# Patient Record
Sex: FEMALE | Race: WHITE | Employment: OTHER | URBAN - METROPOLITAN AREA
[De-identification: names, ages, dates, MRNs, and addresses within clinical notes are randomized per-mention and may not be internally consistent; named-entity substitution may affect disease eponyms.]

---

## 2017-01-05 ENCOUNTER — COMMUNICATION - HEALTHEAST (OUTPATIENT)
Dept: FAMILY MEDICINE | Facility: CLINIC | Age: 72
End: 2017-01-05

## 2017-01-05 ENCOUNTER — RECORDS - HEALTHEAST (OUTPATIENT)
Dept: ADMINISTRATIVE | Facility: OTHER | Age: 72
End: 2017-01-05

## 2017-01-30 ENCOUNTER — COMMUNICATION - HEALTHEAST (OUTPATIENT)
Dept: FAMILY MEDICINE | Facility: CLINIC | Age: 72
End: 2017-01-30

## 2017-01-30 DIAGNOSIS — M85.80 OSTEOPENIA: ICD-10-CM

## 2017-01-30 DIAGNOSIS — C50.919 BREAST CA (H): ICD-10-CM

## 2017-04-18 ENCOUNTER — OFFICE VISIT - HEALTHEAST (OUTPATIENT)
Dept: FAMILY MEDICINE | Facility: CLINIC | Age: 72
End: 2017-04-18

## 2017-04-18 DIAGNOSIS — M54.9 UPPER BACK PAIN: ICD-10-CM
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
## 2017-04-18 ASSESSMENT — MIFFLIN-ST. JEOR: SCORE: 1168.13

## 2017-04-21 ENCOUNTER — COMMUNICATION - HEALTHEAST (OUTPATIENT)
Dept: FAMILY MEDICINE | Facility: CLINIC | Age: 72
End: 2017-04-21

## 2017-05-04 ENCOUNTER — COMMUNICATION - HEALTHEAST (OUTPATIENT)
Dept: FAMILY MEDICINE | Facility: CLINIC | Age: 72
End: 2017-05-04

## 2017-06-10 ENCOUNTER — COMMUNICATION - HEALTHEAST (OUTPATIENT)
Dept: FAMILY MEDICINE | Facility: CLINIC | Age: 72
End: 2017-06-10

## 2017-06-10 DIAGNOSIS — N95.2 VAGINAL ATROPHY: ICD-10-CM

## 2017-06-15 ENCOUNTER — COMMUNICATION - HEALTHEAST (OUTPATIENT)
Dept: FAMILY MEDICINE | Facility: CLINIC | Age: 72
End: 2017-06-15

## 2017-07-09 ENCOUNTER — COMMUNICATION - HEALTHEAST (OUTPATIENT)
Dept: FAMILY MEDICINE | Facility: CLINIC | Age: 72
End: 2017-07-09

## 2017-07-20 ENCOUNTER — COMMUNICATION - HEALTHEAST (OUTPATIENT)
Dept: FAMILY MEDICINE | Facility: CLINIC | Age: 72
End: 2017-07-20

## 2017-07-20 DIAGNOSIS — C50.919 BREAST CA (H): ICD-10-CM

## 2017-08-04 ENCOUNTER — COMMUNICATION - HEALTHEAST (OUTPATIENT)
Dept: FAMILY MEDICINE | Facility: CLINIC | Age: 72
End: 2017-08-04

## 2017-08-07 ENCOUNTER — COMMUNICATION - HEALTHEAST (OUTPATIENT)
Dept: FAMILY MEDICINE | Facility: CLINIC | Age: 72
End: 2017-08-07

## 2017-08-07 DIAGNOSIS — M85.80 OSTEOPENIA: ICD-10-CM

## 2017-09-05 ENCOUNTER — RECORDS - HEALTHEAST (OUTPATIENT)
Dept: ADMINISTRATIVE | Facility: OTHER | Age: 72
End: 2017-09-05

## 2017-09-07 ENCOUNTER — HOSPITAL ENCOUNTER (OUTPATIENT)
Dept: CARDIOLOGY | Facility: HOSPITAL | Age: 72
Discharge: HOME | End: 2017-09-07

## 2017-09-07 DIAGNOSIS — R00.1 SINUS BRADYCARDIA: ICD-10-CM

## 2017-09-26 ENCOUNTER — RECORDS - HEALTHEAST (OUTPATIENT)
Dept: ADMINISTRATIVE | Facility: OTHER | Age: 72
End: 2017-09-26

## 2017-09-27 ENCOUNTER — RECORDS - HEALTHEAST (OUTPATIENT)
Dept: ADMINISTRATIVE | Facility: OTHER | Age: 72
End: 2017-09-27

## 2017-09-28 ENCOUNTER — RECORDS - HEALTHEAST (OUTPATIENT)
Dept: ADMINISTRATIVE | Facility: OTHER | Age: 72
End: 2017-09-28

## 2017-10-09 ENCOUNTER — COMMUNICATION - HEALTHEAST (OUTPATIENT)
Dept: FAMILY MEDICINE | Facility: CLINIC | Age: 72
End: 2017-10-09

## 2017-10-09 ENCOUNTER — AMBULATORY - HEALTHEAST (OUTPATIENT)
Dept: FAMILY MEDICINE | Facility: CLINIC | Age: 72
End: 2017-10-09

## 2017-10-09 ENCOUNTER — AMBULATORY - HEALTHEAST (OUTPATIENT)
Dept: NURSING | Facility: CLINIC | Age: 72
End: 2017-10-09

## 2017-10-09 DIAGNOSIS — Z12.31 VISIT FOR SCREENING MAMMOGRAM: ICD-10-CM

## 2017-10-09 DIAGNOSIS — Z12.39 SCREENING FOR BREAST CANCER: ICD-10-CM

## 2017-10-09 DIAGNOSIS — Z23 FLU VACCINE NEED: ICD-10-CM

## 2017-10-09 DIAGNOSIS — Z12.39 ENCOUNTER FOR BREAST CANCER SCREENING OTHER THAN MAMMOGRAM: ICD-10-CM

## 2017-10-09 DIAGNOSIS — Z12.11 SCREEN FOR COLON CANCER: ICD-10-CM

## 2017-10-10 ENCOUNTER — COMMUNICATION - HEALTHEAST (OUTPATIENT)
Dept: FAMILY MEDICINE | Facility: CLINIC | Age: 72
End: 2017-10-10

## 2017-10-10 DIAGNOSIS — C50.919 BREAST CA (H): ICD-10-CM

## 2017-10-12 ENCOUNTER — COMMUNICATION - HEALTHEAST (OUTPATIENT)
Dept: FAMILY MEDICINE | Facility: CLINIC | Age: 72
End: 2017-10-12

## 2017-10-17 ENCOUNTER — HOSPITAL ENCOUNTER (OUTPATIENT)
Dept: MAMMOGRAPHY | Facility: HOSPITAL | Age: 72
Discharge: HOME | End: 2017-10-17
Attending: FAMILY MEDICINE

## 2017-10-17 DIAGNOSIS — Z12.31 VISIT FOR SCREENING MAMMOGRAM: ICD-10-CM

## 2017-10-19 ENCOUNTER — COMMUNICATION - HEALTHEAST (OUTPATIENT)
Dept: FAMILY MEDICINE | Facility: CLINIC | Age: 72
End: 2017-10-19

## 2017-10-19 DIAGNOSIS — M85.80 OSTEOPENIA: ICD-10-CM

## 2017-10-23 ENCOUNTER — HOSPITAL ENCOUNTER (OUTPATIENT)
Dept: MAMMOGRAPHY | Facility: HOSPITAL | Age: 72
Discharge: HOME | End: 2017-10-23
Attending: FAMILY MEDICINE

## 2017-10-23 DIAGNOSIS — N64.89 BREAST ASYMMETRY: ICD-10-CM

## 2017-10-28 ENCOUNTER — COMMUNICATION - HEALTHEAST (OUTPATIENT)
Dept: FAMILY MEDICINE | Facility: CLINIC | Age: 72
End: 2017-10-28

## 2017-11-05 ENCOUNTER — RECORDS - HEALTHEAST (OUTPATIENT)
Dept: ADMINISTRATIVE | Facility: OTHER | Age: 72
End: 2017-11-05

## 2017-11-14 ENCOUNTER — COMMUNICATION - HEALTHEAST (OUTPATIENT)
Dept: FAMILY MEDICINE | Facility: CLINIC | Age: 72
End: 2017-11-14

## 2017-12-04 ENCOUNTER — COMMUNICATION - HEALTHEAST (OUTPATIENT)
Dept: FAMILY MEDICINE | Facility: CLINIC | Age: 72
End: 2017-12-04

## 2017-12-04 DIAGNOSIS — C50.919 BREAST CA (H): ICD-10-CM

## 2017-12-04 DIAGNOSIS — N95.2 VAGINAL ATROPHY: ICD-10-CM

## 2017-12-15 ENCOUNTER — OFFICE VISIT - HEALTHEAST (OUTPATIENT)
Dept: FAMILY MEDICINE | Facility: CLINIC | Age: 72
End: 2017-12-15

## 2017-12-15 DIAGNOSIS — H26.9 CATARACT: ICD-10-CM

## 2017-12-15 DIAGNOSIS — Z01.818 PREOP EXAMINATION: ICD-10-CM

## 2017-12-15 ASSESSMENT — MIFFLIN-ST. JEOR: SCORE: 1172.67

## 2018-02-01 ENCOUNTER — COMMUNICATION - HEALTHEAST (OUTPATIENT)
Dept: FAMILY MEDICINE | Facility: CLINIC | Age: 73
End: 2018-02-01

## 2018-02-05 ENCOUNTER — OFFICE VISIT - HEALTHEAST (OUTPATIENT)
Dept: FAMILY MEDICINE | Facility: CLINIC | Age: 73
End: 2018-02-05

## 2018-02-05 DIAGNOSIS — H91.90 HEARING DECREASED: ICD-10-CM

## 2018-02-05 DIAGNOSIS — N95.0 POSTMENOPAUSAL BLEEDING: ICD-10-CM

## 2018-02-05 DIAGNOSIS — R31.9 BLOOD IN URINE: ICD-10-CM

## 2018-02-05 LAB
ALBUMIN UR-MCNC: ABNORMAL MG/DL
APPEARANCE UR: CLEAR
BILIRUB UR QL STRIP: NEGATIVE
COLOR UR AUTO: YELLOW
GLUCOSE UR STRIP-MCNC: NEGATIVE MG/DL
HGB UR QL STRIP: NEGATIVE
KETONES UR STRIP-MCNC: NEGATIVE MG/DL
LEUKOCYTE ESTERASE UR QL STRIP: ABNORMAL
NITRATE UR QL: NEGATIVE
PH UR STRIP: 7 [PH] (ref 5–8)
SP GR UR STRIP: 1.01 (ref 1–1.03)
UROBILINOGEN UR STRIP-ACNC: ABNORMAL

## 2018-02-05 ASSESSMENT — MIFFLIN-ST. JEOR: SCORE: 1195.35

## 2018-02-06 LAB
BACTERIA SPEC CULT: NO GROWTH
HPV SOURCE: NORMAL
HUMAN PAPILLOMA VIRUS 16 DNA: NEGATIVE
HUMAN PAPILLOMA VIRUS 18 DNA: NEGATIVE
HUMAN PAPILLOMA VIRUS FINAL DIAGNOSIS: NORMAL
HUMAN PAPILLOMA VIRUS OTHER HR: NEGATIVE
SPECIMEN DESCRIPTION: NORMAL

## 2018-02-12 LAB
BKR LAB AP ABNORMAL BLEEDING: YES
BKR LAB AP BIRTH CONTROL/HORMONES: NORMAL
BKR LAB AP CERVICAL APPEARANCE: NORMAL
BKR LAB AP GYN ADEQUACY: NORMAL
BKR LAB AP GYN INTERPRETATION: NORMAL
BKR LAB AP HPV REFLEX: NORMAL
BKR LAB AP LMP: NORMAL
BKR LAB AP PATIENT STATUS: NORMAL
BKR LAB AP PREVIOUS ABNORMAL: NO
BKR LAB AP PREVIOUS NORMAL: NORMAL
HIGH RISK?: NO
PATH REPORT.COMMENTS IMP SPEC: NORMAL
RESULT FLAG (HE HISTORICAL CONVERSION): NORMAL

## 2018-02-15 ENCOUNTER — OFFICE VISIT - HEALTHEAST (OUTPATIENT)
Dept: AUDIOLOGY | Facility: CLINIC | Age: 73
End: 2018-02-15

## 2018-02-15 DIAGNOSIS — H90.3 SENSORINEURAL HEARING LOSS, BILATERAL: ICD-10-CM

## 2018-03-29 ENCOUNTER — RECORDS - HEALTHEAST (OUTPATIENT)
Dept: ADMINISTRATIVE | Facility: OTHER | Age: 73
End: 2018-03-29

## 2018-09-06 ENCOUNTER — COMMUNICATION - HEALTHEAST (OUTPATIENT)
Dept: FAMILY MEDICINE | Facility: CLINIC | Age: 73
End: 2018-09-06

## 2018-09-06 DIAGNOSIS — M85.80 OSTEOPENIA: ICD-10-CM

## 2018-09-08 ENCOUNTER — COMMUNICATION - HEALTHEAST (OUTPATIENT)
Dept: FAMILY MEDICINE | Facility: CLINIC | Age: 73
End: 2018-09-08

## 2018-11-01 ENCOUNTER — RECORDS - HEALTHEAST (OUTPATIENT)
Dept: ADMINISTRATIVE | Facility: OTHER | Age: 73
End: 2018-11-01

## 2018-12-06 ENCOUNTER — RECORDS - HEALTHEAST (OUTPATIENT)
Dept: ADMINISTRATIVE | Facility: OTHER | Age: 73
End: 2018-12-06

## 2018-12-17 ENCOUNTER — OFFICE VISIT - HEALTHEAST (OUTPATIENT)
Dept: FAMILY MEDICINE | Facility: CLINIC | Age: 73
End: 2018-12-17

## 2018-12-17 DIAGNOSIS — Z13.1 ENCOUNTER FOR SCREENING FOR DIABETES MELLITUS: ICD-10-CM

## 2018-12-17 DIAGNOSIS — M85.80 OSTEOPENIA, UNSPECIFIED LOCATION: ICD-10-CM

## 2018-12-17 DIAGNOSIS — F40.10 SOCIAL ANXIETY DISORDER: ICD-10-CM

## 2018-12-17 DIAGNOSIS — C50.912 BILATERAL MALIGNANT NEOPLASM OF BREAST IN FEMALE, UNSPECIFIED ESTROGEN RECEPTOR STATUS, UNSPECIFIED SITE OF BREAST (H): ICD-10-CM

## 2018-12-17 DIAGNOSIS — E04.9 THYROID GOITER: ICD-10-CM

## 2018-12-17 DIAGNOSIS — C50.911 BILATERAL MALIGNANT NEOPLASM OF BREAST IN FEMALE, UNSPECIFIED ESTROGEN RECEPTOR STATUS, UNSPECIFIED SITE OF BREAST (H): ICD-10-CM

## 2018-12-17 DIAGNOSIS — Z00.00 MEDICARE ANNUAL WELLNESS VISIT, SUBSEQUENT: ICD-10-CM

## 2018-12-17 DIAGNOSIS — L65.9 HAIR LOSS: ICD-10-CM

## 2018-12-17 LAB
FASTING STATUS PATIENT QL REPORTED: YES
GLUCOSE BLD-MCNC: 74 MG/DL (ref 70–99)
HGB BLD-MCNC: 12.1 G/DL (ref 12–16)
TSH SERPL DL<=0.005 MIU/L-ACNC: 1.81 UIU/ML (ref 0.3–5)

## 2018-12-17 ASSESSMENT — MIFFLIN-ST. JEOR: SCORE: 1177.2

## 2018-12-20 ENCOUNTER — RECORDS - HEALTHEAST (OUTPATIENT)
Dept: ADMINISTRATIVE | Facility: OTHER | Age: 73
End: 2018-12-20

## 2018-12-20 ENCOUNTER — RECORDS - HEALTHEAST (OUTPATIENT)
Dept: BONE DENSITY | Facility: CLINIC | Age: 73
End: 2018-12-20

## 2018-12-20 DIAGNOSIS — M85.80 OTHER SPECIFIED DISORDERS OF BONE DENSITY AND STRUCTURE, UNSPECIFIED SITE: ICD-10-CM

## 2018-12-20 DIAGNOSIS — Z78.0 ASYMPTOMATIC MENOPAUSAL STATE: ICD-10-CM

## 2019-09-27 ENCOUNTER — COMMUNICATION - HEALTHEAST (OUTPATIENT)
Dept: FAMILY MEDICINE | Facility: CLINIC | Age: 74
End: 2019-09-27

## 2019-09-27 ENCOUNTER — RECORDS - HEALTHEAST (OUTPATIENT)
Dept: ADMINISTRATIVE | Facility: OTHER | Age: 74
End: 2019-09-27

## 2019-09-27 DIAGNOSIS — Z12.31 ENCOUNTER FOR SCREENING MAMMOGRAM FOR BREAST CANCER: ICD-10-CM

## 2019-09-27 LAB — OCCULT BLOOD (FIT)_EXT (HISTORICAL CONVERSION): NEGATIVE

## 2019-10-24 ENCOUNTER — HOSPITAL ENCOUNTER (OUTPATIENT)
Dept: MAMMOGRAPHY | Facility: CLINIC | Age: 74
Discharge: HOME | End: 2019-10-24
Attending: FAMILY MEDICINE

## 2019-10-24 DIAGNOSIS — Z12.31 ENCOUNTER FOR SCREENING MAMMOGRAM FOR BREAST CANCER: ICD-10-CM

## 2019-11-01 ENCOUNTER — RECORDS - HEALTHEAST (OUTPATIENT)
Dept: HEALTH INFORMATION MANAGEMENT | Facility: CLINIC | Age: 74
End: 2019-11-01

## 2019-12-19 ENCOUNTER — OFFICE VISIT - HEALTHEAST (OUTPATIENT)
Dept: FAMILY MEDICINE | Facility: CLINIC | Age: 74
End: 2019-12-19

## 2019-12-19 ENCOUNTER — RECORDS - HEALTHEAST (OUTPATIENT)
Dept: ADMINISTRATIVE | Facility: OTHER | Age: 74
End: 2019-12-19

## 2019-12-19 DIAGNOSIS — L81.4 OTHER MELANIN HYPERPIGMENTATION: ICD-10-CM

## 2019-12-19 DIAGNOSIS — Z00.00 MEDICARE ANNUAL WELLNESS VISIT, SUBSEQUENT: ICD-10-CM

## 2019-12-19 DIAGNOSIS — C50.911 BILATERAL MALIGNANT NEOPLASM OF BREAST IN FEMALE, UNSPECIFIED ESTROGEN RECEPTOR STATUS, UNSPECIFIED SITE OF BREAST (H): ICD-10-CM

## 2019-12-19 DIAGNOSIS — M85.80 OSTEOPENIA, UNSPECIFIED LOCATION: ICD-10-CM

## 2019-12-19 DIAGNOSIS — Z08 ENCOUNTER FOR FOLLOW-UP EXAMINATION AFTER COMPLETED TREATMENT FOR MALIGNANT NEOPLASM: ICD-10-CM

## 2019-12-19 DIAGNOSIS — N95.0 POSTMENOPAUSAL BLEEDING: ICD-10-CM

## 2019-12-19 DIAGNOSIS — D18.01 HEMANGIOMA OF SKIN AND SUBCUTANEOUS TISSUE: ICD-10-CM

## 2019-12-19 DIAGNOSIS — L65.9 NON-SCARRING HAIR LOSS: ICD-10-CM

## 2019-12-19 DIAGNOSIS — C50.912 BILATERAL MALIGNANT NEOPLASM OF BREAST IN FEMALE, UNSPECIFIED ESTROGEN RECEPTOR STATUS, UNSPECIFIED SITE OF BREAST (H): ICD-10-CM

## 2019-12-19 DIAGNOSIS — Z85.828 PERSONAL HISTORY OF OTHER MALIGNANT NEOPLASM OF SKIN: ICD-10-CM

## 2019-12-19 DIAGNOSIS — D46.Z OTHER MYELODYSPLASTIC SYNDROMES (H): ICD-10-CM

## 2019-12-19 DIAGNOSIS — L98.8 OTHER SPECIFIED DISORDERS OF THE SKIN AND SUBCUTANEOUS TISSUE: ICD-10-CM

## 2019-12-19 DIAGNOSIS — L21.8 OTHER SEBORRHEIC DERMATITIS: ICD-10-CM

## 2019-12-19 DIAGNOSIS — Z13.220 ENCOUNTER FOR SCREENING FOR LIPOID DISORDERS: ICD-10-CM

## 2019-12-19 DIAGNOSIS — E21.2 OTHER HYPERPARATHYROIDISM (H): ICD-10-CM

## 2019-12-19 DIAGNOSIS — Z13.1 ENCOUNTER FOR SCREENING FOR DIABETES MELLITUS: ICD-10-CM

## 2019-12-19 DIAGNOSIS — E04.9 THYROID GOITER: ICD-10-CM

## 2019-12-19 LAB
FASTING STATUS PATIENT QL REPORTED: NO
FERRITIN SERPL-MCNC: 76 NG/ML (ref 10–130)
FOLATE SERPL-MCNC: >20 NG/ML
GLUCOSE BLD-MCNC: 96 MG/DL (ref 74–125)
T4 FREE SERPL-MCNC: 1 NG/DL (ref 0.7–1.8)
TSH SERPL DL<=0.005 MIU/L-ACNC: 1.13 UIU/ML (ref 0.3–5)

## 2019-12-19 ASSESSMENT — MIFFLIN-ST. JEOR: SCORE: 1195.35

## 2019-12-20 LAB
25(OH)D3 SERPL-MCNC: 40.2 NG/ML (ref 30–80)
25(OH)D3 SERPL-MCNC: 40.2 NG/ML (ref 30–80)

## 2019-12-21 LAB — DHEA-S SERPL-MCNC: 22 UG/DL (ref 10–90)

## 2019-12-22 LAB — ANDROST SERPL-MCNC: 0.2 NG/ML (ref 0.13–0.82)

## 2019-12-23 LAB
ANA SER QL: 0.3 U
ZINC SERPL-MCNC: 66.3 UG/DL (ref 60–120)

## 2019-12-24 LAB
SHBG SERPL-SCNC: 96 NMOL/L (ref 30–135)
TESTOST FREE SERPL-MCNC: 0.04 NG/DL (ref 0.06–0.38)
TESTOST SERPL-MCNC: 7 NG/DL (ref 8–60)

## 2020-01-14 ENCOUNTER — COMMUNICATION - HEALTHEAST (OUTPATIENT)
Dept: FAMILY MEDICINE | Facility: CLINIC | Age: 75
End: 2020-01-14

## 2020-02-14 ENCOUNTER — RECORDS - HEALTHEAST (OUTPATIENT)
Dept: ADMINISTRATIVE | Facility: OTHER | Age: 75
End: 2020-02-14

## 2020-07-01 ENCOUNTER — COMMUNICATION - HEALTHEAST (OUTPATIENT)
Dept: FAMILY MEDICINE | Facility: CLINIC | Age: 75
End: 2020-07-01

## 2020-07-01 DIAGNOSIS — N39.0 RECURRENT UTI: ICD-10-CM

## 2020-09-01 ENCOUNTER — COMMUNICATION - HEALTHEAST (OUTPATIENT)
Dept: FAMILY MEDICINE | Facility: CLINIC | Age: 75
End: 2020-09-01

## 2020-11-11 ENCOUNTER — HOSPITAL ENCOUNTER (OUTPATIENT)
Dept: MAMMOGRAPHY | Facility: CLINIC | Age: 75
Discharge: HOME | End: 2020-11-11
Attending: FAMILY MEDICINE

## 2020-11-11 DIAGNOSIS — Z12.31 VISIT FOR SCREENING MAMMOGRAM: ICD-10-CM

## 2020-12-25 ENCOUNTER — COMMUNICATION - HEALTHEAST (OUTPATIENT)
Dept: FAMILY MEDICINE | Facility: CLINIC | Age: 75
End: 2020-12-25

## 2021-01-13 ENCOUNTER — OFFICE VISIT - HEALTHEAST (OUTPATIENT)
Dept: FAMILY MEDICINE | Facility: CLINIC | Age: 76
End: 2021-01-13

## 2021-01-13 DIAGNOSIS — E04.9 THYROID GOITER: ICD-10-CM

## 2021-01-13 DIAGNOSIS — C50.912 BILATERAL MALIGNANT NEOPLASM OF BREAST IN FEMALE, UNSPECIFIED ESTROGEN RECEPTOR STATUS, UNSPECIFIED SITE OF BREAST (H): ICD-10-CM

## 2021-01-13 DIAGNOSIS — Z13.220 ENCOUNTER FOR SCREENING FOR LIPOID DISORDERS: ICD-10-CM

## 2021-01-13 DIAGNOSIS — C50.911 BILATERAL MALIGNANT NEOPLASM OF BREAST IN FEMALE, UNSPECIFIED ESTROGEN RECEPTOR STATUS, UNSPECIFIED SITE OF BREAST (H): ICD-10-CM

## 2021-01-13 DIAGNOSIS — Z78.0 MENOPAUSE: ICD-10-CM

## 2021-01-13 DIAGNOSIS — M85.80 OSTEOPENIA, UNSPECIFIED LOCATION: ICD-10-CM

## 2021-01-13 DIAGNOSIS — H91.93 BILATERAL HEARING LOSS, UNSPECIFIED HEARING LOSS TYPE: ICD-10-CM

## 2021-01-13 DIAGNOSIS — Z00.00 MEDICARE ANNUAL WELLNESS VISIT, SUBSEQUENT: ICD-10-CM

## 2021-01-13 DIAGNOSIS — Z13.1 ENCOUNTER FOR SCREENING FOR DIABETES MELLITUS: ICD-10-CM

## 2021-01-13 DIAGNOSIS — Z90.710 H/O HYSTERECTOMY FOR BENIGN DISEASE: ICD-10-CM

## 2021-01-13 DIAGNOSIS — Z12.11 SPECIAL SCREENING FOR MALIGNANT NEOPLASMS, COLON: ICD-10-CM

## 2021-01-13 LAB
ALBUMIN SERPL-MCNC: 3.7 G/DL (ref 3.5–5)
ALP SERPL-CCNC: 66 U/L (ref 45–120)
ALT SERPL W P-5'-P-CCNC: 19 U/L (ref 0–45)
ANION GAP SERPL CALCULATED.3IONS-SCNC: 10 MMOL/L (ref 5–18)
AST SERPL W P-5'-P-CCNC: 30 U/L (ref 0–40)
BILIRUB SERPL-MCNC: 0.6 MG/DL (ref 0–1)
BUN SERPL-MCNC: 21 MG/DL (ref 8–28)
CALCIUM SERPL-MCNC: 9.7 MG/DL (ref 8.5–10.5)
CHLORIDE BLD-SCNC: 107 MMOL/L (ref 98–107)
CHOLEST SERPL-MCNC: 202 MG/DL
CO2 SERPL-SCNC: 24 MMOL/L (ref 22–31)
CREAT SERPL-MCNC: 1.16 MG/DL (ref 0.6–1.1)
ERYTHROCYTE [DISTWIDTH] IN BLOOD BY AUTOMATED COUNT: 11.7 % (ref 11–14.5)
FASTING STATUS PATIENT QL REPORTED: YES
GFR SERPL CREATININE-BSD FRML MDRD: 46 ML/MIN/1.73M2
GLUCOSE BLD-MCNC: 76 MG/DL (ref 70–125)
HCT VFR BLD AUTO: 42.3 % (ref 35–47)
HDLC SERPL-MCNC: 57 MG/DL
HGB BLD-MCNC: 14.1 G/DL (ref 12–16)
LDLC SERPL CALC-MCNC: 122 MG/DL
MCH RBC QN AUTO: 30.6 PG (ref 27–34)
MCHC RBC AUTO-ENTMCNC: 33.2 G/DL (ref 32–36)
MCV RBC AUTO: 92 FL (ref 80–100)
PLATELET # BLD AUTO: 162 THOU/UL (ref 140–440)
PMV BLD AUTO: 8.2 FL (ref 7–10)
POTASSIUM BLD-SCNC: 4.4 MMOL/L (ref 3.5–5)
PROT SERPL-MCNC: 6.6 G/DL (ref 6–8)
RBC # BLD AUTO: 4.59 MILL/UL (ref 3.8–5.4)
SODIUM SERPL-SCNC: 141 MMOL/L (ref 136–145)
TRIGL SERPL-MCNC: 114 MG/DL
TSH SERPL DL<=0.005 MIU/L-ACNC: 2.15 UIU/ML (ref 0.3–5)
WBC: 6.1 THOU/UL (ref 4–11)

## 2021-01-13 ASSESSMENT — MIFFLIN-ST. JEOR: SCORE: 1170.85

## 2021-02-05 ENCOUNTER — COMMUNICATION - HEALTHEAST (OUTPATIENT)
Dept: FAMILY MEDICINE | Facility: CLINIC | Age: 76
End: 2021-02-05

## 2021-02-08 ENCOUNTER — COMMUNICATION - HEALTHEAST (OUTPATIENT)
Dept: FAMILY MEDICINE | Facility: CLINIC | Age: 76
End: 2021-02-08

## 2021-02-19 ENCOUNTER — RECORDS - HEALTHEAST (OUTPATIENT)
Dept: ADMINISTRATIVE | Facility: OTHER | Age: 76
End: 2021-02-19

## 2021-02-19 ENCOUNTER — RECORDS - HEALTHEAST (OUTPATIENT)
Dept: BONE DENSITY | Facility: CLINIC | Age: 76
End: 2021-02-19

## 2021-02-19 DIAGNOSIS — Z78.0 ASYMPTOMATIC MENOPAUSAL STATE: ICD-10-CM

## 2021-02-23 ENCOUNTER — COMMUNICATION - HEALTHEAST (OUTPATIENT)
Dept: FAMILY MEDICINE | Facility: CLINIC | Age: 76
End: 2021-02-23

## 2021-02-23 DIAGNOSIS — M85.80 OSTEOPENIA, UNSPECIFIED LOCATION: ICD-10-CM

## 2021-02-24 ENCOUNTER — COMMUNICATION - HEALTHEAST (OUTPATIENT)
Dept: ADMINISTRATIVE | Facility: CLINIC | Age: 76
End: 2021-02-24

## 2021-02-24 DIAGNOSIS — M85.80 OSTEOPENIA, UNSPECIFIED LOCATION: ICD-10-CM

## 2021-03-22 ENCOUNTER — AMBULATORY - HEALTHEAST (OUTPATIENT)
Dept: LAB | Facility: CLINIC | Age: 76
End: 2021-03-22

## 2021-03-22 DIAGNOSIS — M85.80 OSTEOPENIA, UNSPECIFIED LOCATION: ICD-10-CM

## 2021-03-23 LAB — 25(OH)D3 SERPL-MCNC: 62.5 NG/ML (ref 30–80)

## 2021-04-28 ENCOUNTER — OFFICE VISIT - HEALTHEAST (OUTPATIENT)
Dept: ENDOCRINOLOGY | Facility: CLINIC | Age: 76
End: 2021-04-28

## 2021-04-28 DIAGNOSIS — M81.0 SENILE OSTEOPOROSIS: ICD-10-CM

## 2021-04-28 DIAGNOSIS — M81.0 OSTEOPOROSIS WITHOUT CURRENT PATHOLOGICAL FRACTURE, UNSPECIFIED OSTEOPOROSIS TYPE: ICD-10-CM

## 2021-04-28 DIAGNOSIS — Z92.29 PERSONAL HISTORY OF OTHER DRUG THERAPY: ICD-10-CM

## 2021-04-28 DIAGNOSIS — E89.0 POSTABLATIVE HYPOTHYROIDISM: ICD-10-CM

## 2021-04-28 RX ORDER — LEVOTHYROXINE SODIUM 25 UG/1
25 TABLET ORAL DAILY
Qty: 90 TABLET | Refills: 3 | Status: SHIPPED | OUTPATIENT
Start: 2021-04-28 | End: 2022-02-15

## 2021-05-12 ENCOUNTER — COMMUNICATION - HEALTHEAST (OUTPATIENT)
Dept: ADMINISTRATIVE | Facility: CLINIC | Age: 76
End: 2021-05-12

## 2021-05-18 ENCOUNTER — COMMUNICATION - HEALTHEAST (OUTPATIENT)
Dept: ENDOCRINOLOGY | Facility: CLINIC | Age: 76
End: 2021-05-18

## 2021-05-30 VITALS — BODY MASS INDEX: 21.82 KG/M2 | WEIGHT: 139 LBS | HEIGHT: 67 IN

## 2021-05-31 VITALS — HEIGHT: 67 IN | BODY MASS INDEX: 21.97 KG/M2 | WEIGHT: 140 LBS

## 2021-05-31 VITALS — HEIGHT: 67 IN | WEIGHT: 145 LBS | BODY MASS INDEX: 22.76 KG/M2

## 2021-06-02 VITALS — HEIGHT: 67 IN | WEIGHT: 141 LBS | BODY MASS INDEX: 22.13 KG/M2

## 2021-06-04 VITALS
SYSTOLIC BLOOD PRESSURE: 108 MMHG | BODY MASS INDEX: 22.76 KG/M2 | HEART RATE: 60 BPM | WEIGHT: 145 LBS | RESPIRATION RATE: 12 BRPM | TEMPERATURE: 97.8 F | DIASTOLIC BLOOD PRESSURE: 68 MMHG | HEIGHT: 67 IN

## 2021-06-04 NOTE — PROGRESS NOTES
Assessment and Plan:       1. Medicare annual wellness visit, subsequent  As far as healthcare maintenance, she had 18.  She did a fit test this year and it was normal.  She had a normal mammogram this year.  She is up-to-date on immunizations.  She is good about seeing the dentist and getting regular exercise.  PHQ 2 equals 0  Mini cog equals 5 out of 5  She had her hearing checked last year and just uses an amplifier.  She does have some urinary leakage, but is just wearing a pad and doing some Kegel exercises.  She thinks it is manageable at this point.    2. Osteopenia, unspecified location  We have been following her osteopenia with high fracture risk.  She prefers to not take Fosamax.  We will recheck her bone density next year so we can tell if there is decline or stability.  If there is some decline, she be willing to see endocrine for possible Prolia or other treatment.    3. Thyroid goiter  She has a prior history of an ablation.  We check a TSH yearly and this has been stable.    4. Bilateral malignant neoplasm of breast in female, unspecified estrogen receptor status, unspecified site of breast (H)  She has a prior history of breast cancer with lumpectomy.  She is up-to-date on mammogram.    5. Encounter for screening for diabetes mellitus  - Glucose      6. Encounter for follow-up examination after completed treatment for malignant neoplasm  Patient saw dermatology for some hair loss and they want listed labs done.  These will be forwarded to dermatology once they are back.  - Androstenedione  - Dehydroepiandrosterone Sulfate, Serum (DHEAS)  - Sex Hormone-Binding Globulin (SHBG)  - Testosterone, Free and Total  - Vitamin D, Total (25-Hydroxy)  - Ferritin  - Folate, Serum  - T4, Free  - Thyroid Stimulating Hormone (TSH)  - Antinuclear Antibodies Screen (RAJANI)  - Zinc, Serum    The patient's current medical problems were reviewed.    The following health maintenance schedule was reviewed with the patient  and provided in printed form in the after visit summary:   Health Maintenance   Topic Date Due     HEPATITIS C SCREENING  1945     ZOSTER VACCINES (2 of 3) 02/11/2010     FALL RISK ASSESSMENT  07/27/2012     INFLUENZA VACCINE RULE BASED (1) 08/01/2019     MEDICARE ANNUAL WELLNESS VISIT  12/17/2019     TD 18+ HE  09/10/2020     DXA SCAN  12/20/2020     LIPID  09/08/2021     MAMMOGRAM  10/24/2021     COLONOSCOPY  10/22/2023     ADVANCE CARE PLANNING  12/17/2023     PNEUMOCOCCAL IMMUNIZATION 65+ LOW/MEDIUM RISK  Completed        Subjective:   Chief Complaint: Ruth Pinon is an 74 y.o. female here for an Annual Wellness visit.   HPI: Overall, patient has been doing very well.  She is very healthy and really does not have any major medical problems other than some osteopenia with high fracture risk.  She does not take any prescription medications.  She takes quite a few supplements.  She is exercising regularly.  She did not want to restart Fosamax so our plan is to recheck bone density next year to check for stability.  She just saw dermatology today for some hair loss and they want her to have some blood work done here.  As far as healthcare maintenance, she is up-to-date on immunizations and mammogram.  She had a normal Cologuard in 2017.  She is getting regular dental and eye visits.    Review of Systems:    Please see above.  The rest of the review of systems are negative for all systems.    Patient Care Team:  Nasra Houser MD as PCP - General (Family Medicine)      Patient Active Problem List   Diagnosis     Breast CA (H)     Thyroid goiter     H/O hysterectomy for benign disease     Social anxiety disorder     Osteopenia     Hearing loss, bilateral     Past Medical History:   Diagnosis Date     Breast cancer (H) 2001    left      Past Surgical History:   Procedure Laterality Date     BLADDER REPAIR       BREAST BIOPSY Left 2001     BREAST LUMPECTOMY Left 2001     HYSTERECTOMY        Family History    Problem Relation Age of Onset     Colon cancer Mother       Social History     Socioeconomic History     Marital status: Single     Spouse name: Not on file     Number of children: Not on file     Years of education: Not on file     Highest education level: Not on file   Occupational History     Not on file   Social Needs     Financial resource strain: Not on file     Food insecurity:     Worry: Not on file     Inability: Not on file     Transportation needs:     Medical: Not on file     Non-medical: Not on file   Tobacco Use     Smoking status: Never Smoker     Smokeless tobacco: Never Used   Substance and Sexual Activity     Alcohol use: Not on file     Drug use: Not on file     Sexual activity: Not on file   Lifestyle     Physical activity:     Days per week: Not on file     Minutes per session: Not on file     Stress: Not on file   Relationships     Social connections:     Talks on phone: Not on file     Gets together: Not on file     Attends Caodaism service: Not on file     Active member of club or organization: Not on file     Attends meetings of clubs or organizations: Not on file     Relationship status: Not on file     Intimate partner violence:     Fear of current or ex partner: Not on file     Emotionally abused: Not on file     Physically abused: Not on file     Forced sexual activity: Not on file   Other Topics Concern     Not on file   Social History Narrative     Not on file      Current Outpatient Medications   Medication Sig Dispense Refill     ASCORBATE CALCIUM (VITAMIN C ORAL) Take by mouth.       b complex vitamins tablet Take 1 tablet by mouth daily.       biotin 1 mg tablet Take 1,000 mcg by mouth 3 (three) times a day.       cholecalciferol, vitamin D3, (VITAMIN D3) 2,000 unit cap Take by mouth.       COPPER ORAL Take by mouth.       LUTEIN ORAL Take by mouth.       MAGNESIUM ORAL Take by mouth.       MILK THISTLE ORAL Take by mouth. rarely       OMEGA-3S/DHA/EPA/FISH OIL (OMEGA 3 ORAL)  "Take by mouth.       s-adenosylmethionine (JARED-E, ENTERIC COATED,) 400 mg TbEC Take by mouth.       vitamin E 400 unit capsule Take 400 Units by mouth daily.       XANTHAN GUM ORAL Take by mouth.       No current facility-administered medications for this visit.       Objective:   Vital Signs:   Visit Vitals  /68   Pulse 60   Temp 97.8  F (36.6  C) (Oral)   Resp 12   Ht 5' 7\" (1.702 m)   Wt 145 lb (65.8 kg)   BMI 22.71 kg/m         VisionScreening:  No exam data present     PHYSICAL EXAM    Physical Exam:  General Appearance: Alert, cooperative, no distress, appears stated age  Head: Normocephalic, without obvious abnormality, atraumatic  Eyes: PERRL, conjunctiva/corneas clear, EOM's intact  Ears: Normal TM's and external ear canals, both ears  Nose: Nares normal, septum midline,mucosa normal, no drainage  Throat: Lips, mucosa, and tongue normal; teeth and gums normal  Neck: Supple, symmetrical, trachea midline, no adenopathy; thyroid: not enlarged, symmetric, no tenderness/mass/nodules; no carotid bruit  Back: Symmetric, no curvature, ROM normal, no CVA tenderness  Lungs: Clear to auscultation bilaterally, respirations unlabored  Breasts: No breast masses, tenderness, asymmetry, or nipple discharge, surgical scar left breast  Heart: Regular rate and rhythm, S1 and S2 normal, no murmur, rub, or gallop,   Abdomen: Soft, non-tender, bowel sounds active all four quadrants,  no masses, no organomegaly  Extremities: Extremities normal, atraumatic, no cyanosis or edema  Skin: Skin color, texture, turgor normal, no rashes or lesions  Lymph nodes: Cervical, supraclavicular, and axillary nodes normal  Neurologic: Normal          Assessment Results 12/19/2019   Activities of Daily Living No help needed   Instrumental Activities of Daily Living No help needed   Mini Cog Total Score 5   Some recent data might be hidden     A Mini-Cog score of 0-2 suggests the possibility of dementia, score of 3-5 suggests no " dementia    Identified Health Risks:     The patient was provided with written information regarding signs of hearing loss.  Information on urinary incontinence and treatment options given to patient.  Patient's advanced directive was discussed and I am comfortable with the patient's wishes.

## 2021-06-05 VITALS
RESPIRATION RATE: 16 BRPM | HEIGHT: 67 IN | DIASTOLIC BLOOD PRESSURE: 48 MMHG | BODY MASS INDEX: 21.91 KG/M2 | SYSTOLIC BLOOD PRESSURE: 112 MMHG | WEIGHT: 139.6 LBS | HEART RATE: 58 BPM

## 2021-06-07 ENCOUNTER — AMBULATORY - HEALTHEAST (OUTPATIENT)
Dept: ENDOCRINOLOGY | Facility: CLINIC | Age: 76
End: 2021-06-07

## 2021-06-10 NOTE — PROGRESS NOTES
Assessment/ Plan     1. Tick bite  Patient has a fairly convincing story for a deer tick bite in the groin area of at least 48 hours.  This was out of state in [place].  I am not sure what the Lyme carrier rate in that state is.  Would recommend a one-time dose of doxycycline 200 mg.  Warned of GI upset and to take with food.  Will follow-up if develops any symptoms such as flulike symptoms or a rash.  - doxycycline (VIBRAMYCIN) 100 MG capsule; Take 2 capsules (200 mg total) by mouth daily for 1 day.  Dispense: 2 capsule; Refill: 0    2. Upper back pain  Patient has been having a long-standing history of upper back pain likely related to some DJD.  Would recommend that she try physical therapy.  If no improvement with therapy, could refer to spine care for possible evaluation of an injection.  - Ambulatory referral to Physical Therapy      Subjective:       Ruth Pinon is a 71 y.o. female who presents for evaluation of a tick bite.  Patient was in [place] doing some hiking.  Her friend had 3 deer ticks on her, but patient did not notice any on herself.  Then after she was back, she noticed an area of inflammation and irritation in the right groin area.  She thinks this was likely a tick bite and was probably on her for at least 48 hours.  Is not having any flulike symptoms, fever or malaise.  She has not developed any rashes.  The area of inflammation around the bite has actually improved.  She is also wondering about some upper back pain.  She states that she has had this for years, we have x-rayed her in the past.  She knows she has some DJD.  Does not radiate anywhere.  It is a constant dull pain.  She really has not done anything for it such as physical therapy.    The following portions of the patient's history were reviewed and updated as appropriate: allergies, current medications, past family history, past medical history, past social history, past surgical history and problem list.    Review of  "Systems   A 12 point comprehensive review of systems was negative except as noted.      Current Outpatient Prescriptions   Medication Sig Dispense Refill     alendronate (FOSAMAX) 70 MG tablet Take 1 tablet (70 mg total) by mouth every 7 days. Take in the morning on an empty stomach with a full glass of water 30 minutes before food 12 tablet 3     anastrozole (ARIMIDEX) 1 mg tablet Take 1 tablet (1 mg total) by mouth daily. 30 tablet 6     b complex vitamins tablet Take 1 tablet by mouth daily.       biotin 1 mg tablet Take 1,000 mcg by mouth 3 (three) times a day.       cholecalciferol, vitamin D3, (VITAMIN D3) 2,000 unit cap Take by mouth.       estradiol (ESTRACE) 0.01 % (0.1 mg/gram) vaginal cream 1 gm q hs three times weekly 42.5 g 12     MILK THISTLE ORAL Take by mouth.       OMEGA-3S/DHA/EPA/FISH OIL (OMEGA 3 ORAL) Take by mouth.       s-adenosylmethionine (JARED-E, ENTERIC COATED,) 400 mg TbEC Take by mouth.       vitamin E 400 unit capsule Take 400 Units by mouth daily.       doxycycline (VIBRAMYCIN) 100 MG capsule Take 2 capsules (200 mg total) by mouth daily for 1 day. 2 capsule 0     No current facility-administered medications for this visit.        Objective:      /60  Pulse 66  Temp 98  F (36.7  C) (Oral)   Resp 14  Ht 5' 7\" (1.702 m)  Wt 139 lb (63 kg)  BMI 21.77 kg/m2      General appearance: alert, appears stated age and cooperative    Back: symmetric, no curvature. ROM normal. No CVA tenderness.  Area of tenderness is the vertebral spines in the midthoracic area.  Lungs: clear to auscultation bilaterally  Heart: regular rate and rhythm, S1, S2 normal, no murmur, click, rub or gallop  Skin: Skin color, texture, turgor normal.  Right groin area has a small area of induration and local irritation.  No signs of a target lesion or cellulitis.      No results found for this or any previous visit (from the past 168 hour(s)).       This note has been dictated using voice recognition software. " Any grammatical or context distortions are unintentional and inherent to the software

## 2021-06-11 NOTE — TELEPHONE ENCOUNTER
Dr. Houser-  See [employer] message.  I have updated her chart with the immunizations.  Her last mammo was 10/24/19 states:    Assessment/Recommendation     Assessment  Side  Recommendation    Benign [2]  Bilateral  Follow Up Mammogram in 1 Year    Study Result     BILATERAL FULL FIELD DIGITAL SCREENING MAMMOGRAM     Performed on: 10/24/19     Compared to: 10/17/2017 Mammo Screening Bilateral, 09/15/2016 Mammo Screening Bilateral, and 08/24/2015 MAMMO SCREENING BILATERAL     Findings: The breasts are heterogeneously dense, which may obscure small masses.  There are postsurgical lumpectomy changes in the left breast.  No evidence for malignancy and no change from the previous exam(s). Continue routine screening mammogram as recommended.     ACR BI-RADS Category 2: Benign Findings     Do you want her to have another one this year? Or can she wait the 2 years?

## 2021-06-13 NOTE — PROGRESS NOTES
No, I just told her about it at her nurse visit. I believe we can enter it online without needing a pt signature.

## 2021-06-13 NOTE — PROGRESS NOTES
AM- Pt was seen in clinic today for a flu shot and was wondering if you could enter an order for a Mammogram and Cologard.   Please call pt to inform that order is entered. Thank you.

## 2021-06-14 NOTE — PROGRESS NOTES
Assessment and Plan:     1. Medicare annual wellness visit, subsequent  Ruth presents for her annual wellness exam.  She is up-to-date on her mammogram.  She had a normal Cologuard in 2017, so was due this year.  Order was placed.  We will plan to repeat her bone density this year.  She is up-to-date on immunizations.  PHQ 2 equals 1  Mini cog equals 5 out of 5  No concerns for falls risk.    2. Osteopenia, unspecified location  She has a history of osteopenia and has been reluctant to start Fosamax.  We will recheck bone density and if there is decline, would refer to endocrinology to discuss possible alternative treatments.  If stable, will continue with regular exercise and calcium intake.    3. H/O hysterectomy for benign disease    4. Bilateral malignant neoplasm of breast in female, unspecified estrogen receptor status, unspecified site of breast (H)  She has a history of a left breast lumpectomy.  She is up-to-date on her mammograms.    5. Bilateral hearing loss, unspecified hearing loss type  This has been stable.    6. Thyroid goiter  She has a history of thyroid goiter.  Rechecking a TSH yearly.  - Thyroid Stimulating Hormone (TSH)  - HM2(CBC w/o Differential)  - Comprehensive Metabolic Panel    7. Encounter for screening for diabetes mellitus      8. Encounter for screening for lipoid disorders  - Lipid [place], FASTING; Future  - Lipid [place], FASTING    9. Menopause  Due for recheck of bone density.  - DXA Bone Density Scan; Future    10. Special screening for malignant neoplasms, colon  - Cologuard     The patient's current medical problems were reviewed.      The following health maintenance schedule was reviewed with the patient and provided in printed form in the after visit summary:   Health Maintenance   Topic Date Due     HEPATITIS C SCREENING  1945     LIPID  09/08/2021     MEDICARE ANNUAL WELLNESS VISIT  01/13/2022     FALL RISK ASSESSMENT  01/13/2022     MAMMOGRAM  11/11/2022      COLORECTAL CANCER SCREENING  01/13/2024     ADVANCE CARE PLANNING  01/13/2026     TD 18+ HE  09/18/2030     DEXA SCAN  12/20/2033     Pneumococcal Vaccine: 65+ Years  Completed     INFLUENZA VACCINE RULE BASED  Completed     ZOSTER VACCINES  Completed     Pneumococcal Vaccine: Pediatrics (0 to 5 Years) and At-Risk Patients (6 to 64 Years)  Aged Out     HEPATITIS B VACCINES  Aged Out        Subjective:   Chief Complaint: Ruth Pinon is an 75 y.o. female here for an Annual Wellness visit.   HPI: Overall, she states she is been doing fairly well.  She has been working from home through the pandemic.  She has been trying to get regular exercise.  We discussed healthcare maintenance and she is due for her Cologuard this year.  Concerns today would include a little bit of a lymph node that she has been palpating on the right side of her neck.  She states that she will have a few days of a sore throat and then she will do warm salt water gargles and it tends to resolve.    She does have a history of tonsil stones.  She also does have some chronic congestion.  She occasionally will have reflux.  She does not think symptoms are severe enough to warrant using a nasal Flonase daily or referral to ENT.  She will continue to monitor.  She also notes little bit of a decreased appetite and has lost out 6 pound since we saw her last.  She feels like this may be related to isolation from the pandemic.  She is not having any other GI symptoms such as abdominal pain, vomiting, diarrhea.    Review of Systems:    Please see above.  The rest of the review of systems are negative for all systems.    Patient Care Team:  Nasra Houser MD as PCP - General (Family Medicine)  Provider, No Primary Care  Nasra Houser MD as Physician (Family Medicine)  Nasra Houser MD as Assigned PCP     Patient Active Problem List   Diagnosis     Breast CA (H)     Thyroid goiter     H/O hysterectomy for benign disease     Social anxiety disorder      Osteopenia     Hearing loss, bilateral     Past Medical History:   Diagnosis Date     Breast cancer (H) 2001    left      Past Surgical History:   Procedure Laterality Date     BLADDER REPAIR       BREAST BIOPSY Left 2001     BREAST LUMPECTOMY Left 2001     HYSTERECTOMY        Family History   Problem Relation Age of Onset     Colon cancer Mother       Social History     Socioeconomic History     Marital status: Single     Spouse name: Not on file     Number of children: Not on file     Years of education: Not on file     Highest education level: Not on file   Occupational History     Not on file   Social Needs     Financial resource strain: Not on file     Food insecurity     Worry: Not on file     Inability: Not on file     Transportation needs     Medical: Not on file     Non-medical: Not on file   Tobacco Use     Smoking status: Never Smoker     Smokeless tobacco: Never Used   Substance and Sexual Activity     Alcohol use: Not on file     Drug use: Not on file     Sexual activity: Not on file   Lifestyle     Physical activity     Days per week: Not on file     Minutes per session: Not on file     Stress: Not on file   Relationships     Social connections     Talks on phone: Not on file     Gets together: Not on file     Attends Oriental orthodox service: Not on file     Active member of club or organization: Not on file     Attends meetings of clubs or organizations: Not on file     Relationship status: Not on file     Intimate partner violence     Fear of current or ex partner: Not on file     Emotionally abused: Not on file     Physically abused: Not on file     Forced sexual activity: Not on file   Other Topics Concern     Not on file   Social History Narrative     Not on file      Current Outpatient Medications   Medication Sig Dispense Refill     ASCORBATE CALCIUM (VITAMIN C ORAL) Take by mouth.       b complex vitamins tablet Take 1 tablet by mouth daily.       biotin 1 mg tablet Take 1,000 mcg by mouth 3 (three)  "times a day.       cholecalciferol, vitamin D3, (VITAMIN D3) 2,000 unit cap Take by mouth.       COPPER ORAL Take by mouth.       LUTEIN ORAL Take by mouth.       MAGNESIUM ORAL Take by mouth.       MILK THISTLE ORAL Take by mouth. rarely       nitrofurantoin, macrocrystal-monohydrate, (MACROBID) 100 MG capsule 1 tablet after intercourse. 20 capsule 0     OMEGA-3S/DHA/EPA/FISH OIL (OMEGA 3 ORAL) Take by mouth.       s-adenosylmethionine (JARED-E, ENTERIC COATED,) 400 mg TbEC Take by mouth.       UNABLE TO FIND Med Name: Brain supplement       vitamin E 400 unit capsule Take 400 Units by mouth daily.       XANTHAN GUM ORAL Take by mouth.       No current facility-administered medications for this visit.       Objective:   Vital Signs:   Visit Vitals  /48   Pulse (!) 58   Resp 16   Ht 5' 7\" (1.702 m)   Wt 139 lb 9.6 oz (63.3 kg)   LMP  (LMP Unknown)   BMI 21.86 kg/m           VisionScreening:  No exam data present     PHYSICAL EXAM    Physical Exam:  General Appearance: Alert, cooperative, no distress, appears stated age  Head: Normocephalic, without obvious abnormality, atraumatic  Eyes: PERRL, conjunctiva/corneas clear, EOM's intact  Ears: Normal TM's and external ear canals, both ears  Nose: Nares normal, septum midline,mucosa normal, no drainage  Throat: Lips, mucosa, and tongue normal; teeth and gums normal  Neck: Supple, symmetrical, trachea midline, no adenopathy; thyroid: not enlarged, symmetric, no tenderness/mass/nodules; no carotid bruit  Back: Symmetric, no curvature, ROM normal, no CVA tenderness  Lungs: Clear to auscultation bilaterally, respirations unlabored  Breasts: No breast masses, tenderness, asymmetry, or nipple discharge.  Lumpectomy scar left breast  Heart: Regular rate and rhythm, S1 and S2 normal, no murmur, rub, or gallop, Abdomen: Soft, non-tender, bowel sounds active all four quadrants,  no masses, no organomegaly  Extremities: Extremities normal, atraumatic, no cyanosis or " edema  Skin: Skin color, texture, turgor normal, no rashes or lesions  Lymph nodes: Cervical, supraclavicular, and axillary nodes normal  Neurologic: Normal          Assessment Results 1/13/2021   Activities of Daily Living No help needed   Instrumental Activities of Daily Living No help needed   Mini Cog Total Score 5   Some recent data might be hidden     A Mini-Cog score of 0-2 suggests the possibility of dementia, score of 3-5 suggests no dementia    Identified Health Risks:     The patient was provided with written information regarding signs of hearing loss.  Information on urinary incontinence and treatment options given to patient.  The patient was provided with suggestions to help her develop a healthy emotional lifestyle.   Patient's advanced directive was discussed and I am comfortable with the patient's wishes.

## 2021-06-14 NOTE — PROGRESS NOTES
Assessment/Plan:      Visit for Preoperative Exam.     Patient approved for surgery with general or local anesthesia. Copy of the pre-op was given to the patient to bring along on the day of surgery. Low Risk Surgery.     Subjective:     History of Present Illness    Ruth is a 72-year-old woman who presents to the [facility] today for preop physical.  She is having both her cataracts done, several weeks apart.  She has had surgeries in the past without any issues with anesthesia.  She is very healthy and has no limitations to her activities.  She takes Fosamax for osteoporosis and arimidex for a history of breast cancer.  She had mild COPD diagnosed at the [facility], but not severe enough to warrant treatment.  She is up-to-date on immunizations including pneumonia, flu, and shingles.  She has no known coronary artery disease and denies chest pain or shortness of breath.  No recent illnesses or exposures.  This is a low risk procedure, so no need for EKG or blood work.      Scheduled Procedure: Cataract  Surgery Date:  12/19/17  Surgery Location:  [facility]  Surgeon:  Dr. Espino    Current Outpatient Prescriptions   Medication Sig Dispense Refill     alendronate (FOSAMAX) 70 MG tablet Take 1 tablet (70 mg total) by mouth every 7 days. Take in the morning on an empty stomach with a full glass of water 30 minutes before food 12 tablet 3     anastrozole (ARIMIDEX) 1 mg tablet Take 1 tablet (1 mg total) by mouth daily. 90 tablet 0     ASCORBATE CALCIUM (VITAMIN C ORAL) Take by mouth.       b complex vitamins tablet Take 1 tablet by mouth daily.       biotin 1 mg tablet Take 1,000 mcg by mouth 3 (three) times a day.       cholecalciferol, vitamin D3, (VITAMIN D3) 2,000 unit cap Take by mouth.       COPPER ORAL Take by mouth.       estradiol (ESTRACE) 0.01 % (0.1 mg/gram) vaginal cream 1 gm q hs three times weekly 42.5 g 12     LUTEIN ORAL Take by mouth.       MAGNESIUM ORAL Take by mouth.       MILK  THISTLE ORAL Take by mouth. rarely       nitrofurantoin, macrocrystal-monohydrate, (MACROBID) 100 MG capsule Take 1 tablet post-intercourse. (Patient taking differently: Take 1 tablet post-intercourse. Rarely) 30 capsule 6     OMEGA-3S/DHA/EPA/FISH OIL (OMEGA 3 ORAL) Take by mouth.       s-adenosylmethionine (JARED-E, ENTERIC COATED,) 400 mg TbEC Take by mouth.       vitamin E 400 unit capsule Take 400 Units by mouth daily.       XANTHAN GUM ORAL Take by mouth.       No current facility-administered medications for this visit.        Allergies   Allergen Reactions     Sulfa (Sulfonamide Antibiotics)        Immunization History   Administered Date(s) Administered     Influenza high dose, seasonal 09/08/2016, 10/09/2017     Pneumo Conj 13-V (2010&after) 10/02/2015     Pneumo Polysac 23-V 08/28/2013     Td,adult,historic,unspecified 05/25/2000     Tdap 09/10/2010     ZOSTER 12/17/2009       Patient Active Problem List   Diagnosis     Breast CA     Thyroid goiter     H/O hysterectomy for benign disease     Social anxiety disorder     Osteopenia     Hearing loss, bilateral       Past Medical History:   Diagnosis Date     Breast cancer 2001    left       Social History     Social History     Marital status: Single     Spouse name: N/A     Number of children: N/A     Years of education: N/A     Occupational History     Not on file.     Social History Main Topics     Smoking status: Never Smoker     Smokeless tobacco: Not on file     Alcohol use Not on file     Drug use: Not on file     Sexual activity: Not on file     Other Topics Concern     Not on file     Social History Narrative       Past Surgical History:   Procedure Laterality Date     BLADDER REPAIR       BREAST BIOPSY Left 2001     BREAST LUMPECTOMY Left 2001     HYSTERECTOMY         Review of Systems  A 12 point comprehensive review of systems was negative except as noted.    Fever: no  Chills: no  Fatigue: no  Chest Pain: no  Cough: no  Dyspnea: no  Urinary  "Frequency: no  Nausea: no  Vomiting: no  Diarrhea: no  Abdominal Pain: no  Easy Bruising: no  Lower Extremity Swelling: no  Poor Exercise Tolerance: no      Pertinent History  Prior Anesthesia: yes  Previous Anesthesia Reaction:  no  Diabetes: no  Cardiovascular Disease: no  Pulmonary Disease: mild COPD  Renal Disease: no  GI Disease: no  Sleep Apnea: no  Thromboembolic Problems: no  Clotting Disorder: no  Bleeding Disorder: no  Transfusion Reaction: no  Impaired Immunity: no  Steroid use in the last 6 months: no  Frequent Aspirin use: no    No family history of sudden death    Social history of   Social History   Substance Use Topics     Smoking status: Never Smoker     Smokeless tobacco: None     Alcohol use None       After surgery, the patient plans to recover at home with family.        Objective:         Vitals:    12/15/17 0839   BP: 104/58   Pulse: 72   Resp: 16   Temp: 97.4  F (36.3  C)   TempSrc: Oral   Weight: 140 lb (63.5 kg)   Height: 5' 7\" (1.702 m)       Physical Exam:  Physical Exam:  General Appearance: Alert, cooperative, no distress, appears stated age  Head: Normocephalic, without obvious abnormality, atraumatic  Eyes: PERRL, conjunctiva/corneas clear, EOM's intact  Ears: Normal TM's and external ear canals, both ears  Nose: Nares normal, septum midline,mucosa normal, no drainage  Throat: Lips, mucosa, and tongue normal; teeth and gums normal  Neck: Supple, symmetrical, trachea midline, no adenopathy;  thyroid: not enlarged, symmetric, no tenderness/mass/nodules; no carotid bruit or JVD  Back: Symmetric, no curvature, ROM normal, no CVA tenderness  Lungs: Clear to auscultation bilaterally, respirations unlabored  Heart: Regular rate and rhythm, S1 and S2 normal, no murmur, rub, or gallop,   Abdomen: Soft, non-tender, bowel sounds active all four quadrants,  no masses, no organomegaly  Extremities: Extremities normal, atraumatic, no cyanosis or edema  Skin: Skin color, texture, turgor normal, no " rashes or lesions  Lymph nodes: Cervical, supraclavicular, and axillary nodes normal  Neurologic: Normal        Results for orders placed or performed in visit on 09/08/16   Comprehensive Metabolic Panel   Result Value Ref Range    Sodium 143 136 - 145 mmol/L    Potassium 4.3 3.5 - 5.0 mmol/L    Chloride 108 (H) 98 - 107 mmol/L    CO2 23 22 - 31 mmol/L    Anion Gap, Calculation 12 5 - 18 mmol/L    Glucose 87 70 - 125 mg/dL    BUN 12 8 - 22 mg/dL    Creatinine 0.96 0.60 - 1.10 mg/dL    GFR MDRD Af Amer >60 >60 mL/min/1.73m2    GFR MDRD Non Af Amer 58 (L) >60 mL/min/1.73m2    Bilirubin, Total 0.7 0.0 - 1.0 mg/dL    Calcium 9.7 8.5 - 10.5 mg/dL    Protein, Total 6.5 6.0 - 8.0 g/dL    Albumin 3.9 3.5 - 5.0 g/dL    Alkaline Phosphatase 79 45 - 120 U/L    AST 26 0 - 40 U/L    ALT 17 0 - 45 U/L   HM2(CBC w/o Differential)   Result Value Ref Range    WBC 6.9 4.0 - 11.0 thou/uL    RBC 4.54 3.80 - 5.40 mill/uL    Hemoglobin 13.8 12.0 - 16.0 g/dL    Hematocrit 41.3 35.0 - 47.0 %    MCV 91 80 - 100 fL    MCH 30.4 27.0 - 34.0 pg    MCHC 33.5 32.0 - 36.0 g/dL    RDW 11.8 11.0 - 14.5 %    Platelets 146 140 - 440 thou/uL    MPV 7.8 7.0 - 10.0 fL   Lipid Cascade   Result Value Ref Range    Cholesterol 198 <=199 mg/dL    Triglycerides 98 <=149 mg/dL    HDL Cholesterol 55 >=50 mg/dL    LDL Calculated 123 <=129 mg/dL    Patient Fasting > 8hrs? Yes    Vitamin D, Total (25-Hydroxy)   Result Value Ref Range    Vitamin D, Total (25-Hydroxy) 37.4 30.0 - 80.0 ng/mL   Thyroid Stimulating Hormone (TSH)   Result Value Ref Range    TSH 1.70 0.30 - 5.00 uIU/mL       Nasra Houser MD

## 2021-06-15 NOTE — TELEPHONE ENCOUNTER
Patient should have a Vit D lab, last done was 2019. Patient is not yet established with [facility], referring provider please place order for lab and call patient to schedule lab appt prior to 4/28/21 consult with Endo.

## 2021-06-15 NOTE — TELEPHONE ENCOUNTER
Consult appointment scheduled for 04.28.2021. Please review chart and advise on if patient will need labs before her appointment.     Ruth @ [phone]

## 2021-06-15 NOTE — TELEPHONE ENCOUNTER
Patient returning call. Relayed Dr. Houser's message to patient. She states understanding. Provided phone# to patient as well.

## 2021-06-15 NOTE — TELEPHONE ENCOUNTER
Left message to call back for: results  Information to relay to patient:  LMTCB, Please see message below.    Endocrinology phone # [phone]

## 2021-06-15 NOTE — TELEPHONE ENCOUNTER
Let pt know there has been worsening of the bone density.  I would recommend referral to endo - referral placed, please give info to schedule

## 2021-06-16 PROBLEM — J43.9 EMPHYSEMA/COPD (H): Status: ACTIVE | Noted: 2021-04-30

## 2021-06-16 NOTE — PROGRESS NOTES
"Ruth Pinon is a 75 y.o. female who is being evaluated via a billable video visit.      How would you like to obtain your AVS? MyChart.  If dropped from the video visit, the video invitation should be resent by: Text to cell phone: [phone]  Will anyone else be joining your video visit? No      Video Start Time: 1420      Reason for visit      1. Osteoporosis without current pathological fracture, unspecified osteoporosis type    2. Senile osteoporosis    3. Postablative hypothyroidism    4. Personal history of other drug therapy        History     Ruth Pinon is a very pleasant 75 y.o. old female who presents for follow up.   SUMMARY:    Ruth is contacted today via Video visit to establish care for Osteoporosis and Postablative Hypothyroidism. She has a family hx of Osteo in her mother. She was a smoker, but quit some time ago. She has used Bisphosphonates in the past, but is not on anything currently. She has the unfortunate hx of Breast Cancer with Aromatase inhibitor and Radiation treatment. She has had a ISAIAH about the same time that she would be experiencing Menopause. Her most recent Dexa scan showed that she had lost BMD in both hips, and that her L forearm density is quite low at -3.7.  She is interested in starting Prolia.     Vitamin D: 62.5 (3/22/21)  Calcium: 9.7 (1/13/21)    Pt is status post ablative procedure on her thyroid. She has never been on medication, as her TSH and fT4 have always been \"within normal range\".  She currently endorses significant Alopecia, including a lack of eyebrows. She reports very dry skin and \"bad\" fingernails. She has some fatigue as well. Her current TSH is 2.15.      Risk Factors     The following high- risk conditions have been ruled out: celiac disease, eating disorders, gastric bypass, hyperparathyroidism, inflammatory bowel disease, hyperthyroidism, rheumatoid arthritis, lupus, chronic kidney disease.    Ruth Pinon has the following risk factors: " Age, Female gender, , Low BMI and Family history of osteoporosis    She is not on high risk medications such as glucocorticoids, anti-coagulants, anti-convulsants, chemotherapy or levothyroxine.          Past Medical History     Patient Active Problem List   Diagnosis     Breast CA (H)     Thyroid goiter     H/O hysterectomy for benign disease     Social anxiety disorder     Osteopenia     Hearing loss, bilateral     Emphysema/COPD (H)       Family History       family history includes Colon cancer in her mother.    Social History      reports that she has never smoked. She has never used smokeless tobacco.      Review of Systems     Patient denies current pain, limited mobility, fractures.   Remainder per HPI.      Vital Signs     LMP  (LMP Unknown)     Physical Exam     GENERAL:  Normal, NIRD  EYES:  Pupils equal, round and reactive to light; no proptosis, lid lag or  periorbital edema.  NECK: Normal in appearance   LUNGS:  No respiratory distress, normal chest excursion  NEURO:  No tremors  SKIN:  No acanthosis nigricans or vitiligo        Assessment     1. Osteoporosis without current pathological fracture, unspecified osteoporosis type    2. Senile osteoporosis    3. Postablative hypothyroidism    4. Personal history of other drug therapy        Plan       We will get a PA started for the Prolia. I will get a Brochure to her, as well as the side effects hand out to her in the mail. We will plan on another Dexa Scan after a year of Prolia therapy.     We will start 25 mcg of Levothyroxine daily. We will recheck her labs and her symptoms in 2 months.     F/u with me in 1 year.       Cora REYNOSO Endocrinology  4/30/2021  1:20 PM             Current Medications     Outpatient Medications Prior to Visit   Medication Sig Dispense Refill     ASCORBATE CALCIUM (VITAMIN C ORAL) Take by mouth.       b complex vitamins tablet Take 1 tablet by mouth daily.       biotin 1 mg tablet Take 1,000 mcg by mouth 3  (three) times a day.       cholecalciferol, vitamin D3, (VITAMIN D3) 2,000 unit cap Take by mouth.       COPPER ORAL Take by mouth.       LUTEIN ORAL Take by mouth.       MAGNESIUM ORAL Take by mouth.       OMEGA-3S/DHA/EPA/FISH OIL (OMEGA 3 ORAL) Take by mouth.       s-adenosylmethionine (JARED-E, ENTERIC COATED,) 400 mg TbEC Take by mouth.       UNABLE TO FIND Med Name: Brain supplement       vitamin E 400 unit capsule Take 400 Units by mouth daily.       XANTHAN GUM ORAL Take by mouth.       MILK THISTLE ORAL Take by mouth. rarely       nitrofurantoin, macrocrystal-monohydrate, (MACROBID) 100 MG capsule 1 tablet after intercourse. 20 capsule 0     No facility-administered medications prior to visit.          Lab Results     TSH   Date Value Ref Range Status   01/13/2021 2.15 0.30 - 5.00 uIU/mL Final     Calcium   Date Value Ref Range Status   01/13/2021 9.7 8.5 - 10.5 mg/dL Final           Imaging Results   Last DEXA scan:  Results for orders placed in visit on 02/19/21   DXA Bone Density Scan    Narrative 2/19/2021      RE: Ruth Pinon  YOB: 1945        Dear Nasra Houser,    Patient Profile:  75 y.o. female, postmenopausal, is here for the follow up bone density   test.   History of fractures - None. Family history of osteoporosis - Yes;    mother.  Family history of hip fracture: None. Smoking history - Past.   Osteoporosis treatment past -  Yes;  Bisphosphonates. Osteoporosis   treatment current - No.  Chronic medical problems - Breast cancer, History   of kidney stones, Hyperparathyroidism, Hysterectomy, Liver disease,   Oophorectomy and Radiation treatment. High risk medications -  Aromatase   Inhibitor;  Yes, in the Past.      Assessment:    1. The spine bone density L1-L2 with T-score -1.3,with no statistically   significant change compared to the previous DXA scan done in 2018.  2. Femoral bone densities show left femoral neck T- score -2.0 and right   femoral neck T-score -2.2, and  significant decline of 4.3% on the left hip   and 5.0% on the right hip compared to 2018.  3. Trabecular bone score indicates moderate trabecular bone architecture.  4. Left forearm bone density with T-score -3.7.    75 y.o. female with OSTEOPOROSIS and HIGH fracture risk.        Recommendations:  Appropriate evaluation and treatment recommended with follow up bone   density scan after 1 year of active treatment.      Bone densitometry was performed on your patient using our [employer] iDXA   densitometer. The results are summarized and a copy of the actual scans   are included for your review. In conformity with the International Society   of Clinical Densitometry's most recent position statement for DXA   interpretation (2015), the diagnosis will be made on the lowest measured   T-score of the lumbar spine, femoral neck, total proximal femur or 33%   radius. Note the change in terminology for diagnostic classification from   OSTEOPENIA to LOW BONE MASS. All trending for sequential exams will be   done using multiple vertebrae or the total proximal femur. Fracture risk   is based on the WHO Fracture Risk Assessment Tool (FRAX). If additional   information is needed or if you would like to discuss the results, please   do not hesitate to call me.       Thank you for referring this patient to [facility]   [facility]. We are happy to be of service in support of you and your   practice. If you have any questions or suggestions to improve our service,   please call me at [phone].     Sincerely,     Jeff Rodas M.D. ANATOLIY.  [facility]       Video-Visit Details    Type of service:  Video Visit    Video End Time (time video stopped): 1520  Originating Location (pt. Location): Home    [facility]     Platform used for Video Visit: [employer]

## 2021-06-16 NOTE — PROGRESS NOTES
Audiology only; referred by Nasra Houser    History:  Known bilateral hearing loss with gradual onset over 7+ years. She reported trying an over-the-counter ear level amplifier in the right ear with initial success; the device now seems to not work as well (not brought to today's appointment) and patient reported she hasn't used it recently. 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 She denied otalgia, tinnitus, dizziness, or recent illness. She reported enjoying louder music when younger, and reported using firearms in the past without use of hearing protection (hasn't been exposed to firearm noise for many years).     Results:  Otoscopy was unremarkable in either ear. Hearing sensitivity was assessed with good reliability using insert phones.      Mild-moderate, essentially flat hearing loss across the speech spectrum, bilaterally.    Speech reception thresholds showed agreement with pure tone findings in each ear. Word recognition ability was excellent, bilaterally, with presentation levels significantly above typical conversational volume in both ears.    Tympanometry was consistent with normal middle ear function, bilaterally.    Recommendations:  Follow-up with PCP; retest hearing annually (to monitor) or per medical management.  Wear hearing protection consistently in noise to preserve residual hearing sensitivity. The current OTC option she is using is likely an appropriate remedy, given the degree and configuration of her hearing loss. A suggestion was made to return to its place of purchase to see if cleaning/repair are indicated, especially if it is under warranty. A binaural fit was also encouraged, to assist with hearing in noise and for better sound localization. She would also be considered a potential binaural amplification candidate (traditional hearing aids), if patient motivation exists and medical clearance is granted. Appropriate communication strategies  were discussed at length, as were reasonable expectations regarding hearing at a distance, in noisy environments, or when attention is diverted elsewhere.    Diane Alexis, [facility]  [place] Licensed Audiologist [id number]

## 2021-06-17 NOTE — TELEPHONE ENCOUNTER
5/7 - lvm x 1      ----- Message from Janice Alves RN sent at 5/6/2021 11:59 AM CDT -----    PA for prolia approved, please call to schedule an appt. Thanks

## 2021-06-17 NOTE — TELEPHONE ENCOUNTER
Date: 6/7/2021 Status: [facility]   Time: 2:30 PM Length: 15   Visit Type: NURSE VISIT [[id number]] Copay: $0.00   Provider: [facility]

## 2021-06-17 NOTE — PATIENT INSTRUCTIONS - HE
Patient Instructions by Nasra Houser MD at 12/19/2019  2:40 PM     Author: Nasra Houser MD Service: -- Author Type: Physician    Filed: 12/19/2019  2:52 PM Encounter Date: 12/19/2019 Status: Signed    : Nasra Houser MD (Physician)         Patient Education   Signs of Hearing Loss  Hearing loss is a problem shared by many people. In fact, it is one of the most common health conditions, particularly as people age. Most people over age 65 have some hearing loss, and by age 80, almost everyone does. Because hearing loss usually occurs slowly over the years, you may not realize your hearing ability has gotten worse.       Have your hearing checked  Contact your [facility] care provider if you:    Have to strain to hear normal conversation.    Have to watch other peoples faces very carefully to follow what theyre saying.    Need to ask people to repeat what theyve said.    Often misunderstand what people are saying.    Turn the volume of the television or radio up so high that others complain.    Feel that people are mumbling when theyre talking to you.    Find that the effort to hear leaves you feeling tired and irritated.    Notice, when using the phone, that you hear better with 1 ear than the other.    0298-0132 [employer]. [address] [place] [address], [place] [address]. All rights reserved. This information is not intended as a substitute for professional medical care. Always follow your healthcare professional's instructions.         Patient Education   Urinary Incontinence, Female (Adult)  Urinary incontinence means loss of control of the bladder. This problem affects many women, especially as they get older. If you have incontinence, you may be embarrassed to ask for help. But know that this problem can be treated.  Types of Incontinence  There are different types of incontinence. Two of the main types are described here. You can have more than one type.    Stress incontinence. With this type,  urine leaks when pressure (stress) is put on the bladder. This may happen when you cough, sneeze, or laugh. Stress incontinence most often occurs because the pelvic floor muscles that support the bladder and urethra are weak. This can happen after pregnancy and vaginal childbirth or a hysterectomy. It can also be due to excess body weight or hormone changes.    Urge incontinence (also called overactive bladder). With this type, a sudden urge to urinate is felt often. This may happen even though there may not be much urine in the bladder. The need to urinate often during the night is common. Urge incontinence most often occurs because of bladder spasms. This may be due to bladder irritation or infection. Damage to bladder nerves or pelvic muscles, constipation, and certain medicines can also lead to urge incontinence.  Treatment of urinary incontinence depends on the cause. Further evaluation is needed to find the type you have. This will likely include an exam and certain tests. Based on the results, you and your healthcare provider can then plan treatment. Until a diagnosis is made, the home care tips below can help relieve symptoms.  Home care    Do pelvic floor muscle exercises, if they are prescribed. The pelvic floor muscles help support the bladder and urethra. Many women find that their symptoms improve when doing special exercises that strengthen these muscles. To do the exercises contract the muscles you would use to stop your stream of urine, but do this when youre not urinating. Hold for 10 seconds, then relax. Repeat 10 to 20 times in a row, at least 3 times a day. Your provider may give you other instructions for how to do the exercises and how often.    Keep a bladder diary. This helps track how often and how much you urinate over a set period of time. Bring this diary with you to your next visit with the provider. The information can help your provider learn more about your bladder problem.    Lose  weight, if advised to by your provider. Excess weight puts pressure on the bladder. Your provider can help you create a weight-loss plan thats right for you. This may include exercising more and making certain diet changes.    Don't consume foods and drinks that may irritate the bladder. These can include alcohol and caffeinated drinks.    Quit smoking. Smoking and other tobacco use can lead to chronic cough that strains the pelvic floor muscles. Smoking may also damage the bladder and urethra. Talk with your provider about treatments or methods you can use to quit smoking.    If drinking large amounts of fluid causes you to have symptoms, you may be advised to limit your fluid intake. You may also be advised to drink most of your fluids during the day and to limit fluids at night.    If youre worried about urine leakage or accidents, you may wear absorbent pads to catch urine. Change the pads often. This helps reduce discomfort. It may also reduce the risk of skin or bladder infections.  Follow-up care  Follow up with your healthcare provider, or as directed. It may take some to find the right treatment for your problem. Your treatment plan may include special therapies or medicines. Certain procedures or surgery may also be options. Be sure to discuss any questions you have with your provider.  When to seek medical advice  Call the healthcare provider right away if any of these occur:    Fever of 100.4 F (38 C) or higher, or as directed by your provider    Bladder pain or fullness    Abdominal swelling    Nausea or vomiting    Back pain    Weakness, dizziness or fainting  Date Last Reviewed: 10/1/2017    4177-0518 [employer]. [address] [place] [address], [place] [address]. All rights reserved. This information is not intended as a substitute for professional medical care. Always follow your healthcare professional's instructions.       Advance Directive  Patients advance directive was discussed and I am  comfortable with the patients wishes.  Patient Education   Personalized Prevention Plan  You are due for the preventive services outlined below.  Your care team is available to assist you in scheduling these services.  If you have already completed any of these items, please share that information with your care team to update in your medical record.  Health Maintenance   Topic Date Due   ? HEPATITIS C SCREENING  1945   ? ZOSTER VACCINES (2 of 3) 02/11/2010   ? FALL RISK ASSESSMENT  07/27/2012   ? INFLUENZA VACCINE RULE BASED (1) 08/01/2019   ? MEDICARE ANNUAL WELLNESS VISIT  12/17/2019   ? TD 18+ HE  09/10/2020   ? DXA SCAN  12/20/2020   ? LIPID  09/08/2021   ? MAMMOGRAM  10/24/2021   ? COLONOSCOPY  10/22/2023   ? ADVANCE CARE PLANNING  12/17/2023   ? PNEUMOCOCCAL IMMUNIZATION 65+ LOW/MEDIUM RISK  Completed

## 2021-06-22 NOTE — PROGRESS NOTES
Assessment and Plan:       1. Medicare annual wellness visit, subsequent  As far as healthcare maintenance, she is up-to-date on mammogram, will be due in 2019.  She had a colonoscopy in 2013, so will be due in 2023.  She had her flu shot and shingles vaccine.    2. Osteopenia, unspecified location  Patient has osteopenia with high fracture risk.  She was on Fosamax for about a year and a half but then stopped 6 months ago when she read the side effects.  She thought maybe it was causing her to lose hair.  Her hair is not come back after stopping it.  She tries to be good about calcium, vitamin D, and exercise.  She would prefer not to go back on it.  We will check a bone density and see where she is at.  - DXA Bone Density Scan; Future    3. Bilateral malignant neoplasm of breast in female, unspecified estrogen receptor status, unspecified site of breast (H)  Patient has a history of left breast cancer.  She was supposed to be on tamoxifen lifelong, but stopped taking it due to the side effects.  She is preferring to do mammogram every other year.  She does not follow with oncology anymore.    4. Social anxiety disorder  Stable    5. Thyroid goiter  Patient has a history of radioiodine ablation.  She has not required medications.  We will check a TSH today especially in light of hair loss, dry skin, and cold intolerance.  - Thyroid Stimulating Hormone (TSH)    6. Hair loss  Patient has had some hair loss of unclear etiology.  This may just be hereditary.  Will check a hemoglobin and a TSH.  She thought maybe it was due to Fosamax, but did not come back when she stopped it.  - Hemoglobin    7. Encounter for screening for diabetes mellitus  - Glucose     The patient's current medical problems were reviewed.    The following health maintenance schedule was reviewed with the patient and provided in printed form in the after visit summary:   Health Maintenance   Topic Date Due     ADVANCE DIRECTIVES DISCUSSED WITH  PATIENT  07/27/1963     ZOSTER VACCINES (2 of 3) 02/11/2010     FALL RISK ASSESSMENT  07/27/2012     INFLUENZA VACCINE RULE BASED (1) 08/01/2018     DXA SCAN  09/15/2018     MAMMOGRAM  10/23/2019     TD 18+ HE  09/10/2020     COLONOSCOPY  10/22/2023     PNEUMOCOCCAL POLYSACCHARIDE VACCINE AGE 65 AND OVER  Completed     PNEUMOCOCCAL CONJUGATE VACCINE FOR ADULTS (PCV13 OR PREVNAR)  Completed        Subjective:   Chief Complaint: Ruth Pinon is an 73 y.o. female here for an Annual Wellness visit.   HPI: Overall, Ruth has been doing fairly well.  She stopped several of her medications this year.  She was on tamoxifen for breast cancer and was supposed to be on that indefinitely.  She just did not like the side effects of stopped taking it.  She was on Fosamax for high fracture risk and stopped taking it when she read the side effects including potential hair loss.  She said a couple year history of some hair loss.  She states she took it for about a year and a half and then stopped 6 months ago.  She is due to have a bone density rechecked.  She also stopped taking her vaginal estrogen and she felt like it was not really helping.  She did have a prescription for prophylactic antibiotics for recurrent UTIs and she no longer is requiring that.  We discussed healthcare maintenance and she had her shingles vaccine and flu shot.  She is up-to-date on mammogram and colonoscopy.    Review of Systems:    Please see above.  The rest of the review of systems are negative for all systems.    Patient Care Team:  Nasra Houser MD as PCP - General (Family Medicine)  Provider, No Primary Care  Nasra Houser MD as Physician (Family Medicine)     Patient Active Problem List   Diagnosis     Breast CA (H)     Thyroid goiter     H/O hysterectomy for benign disease     Social anxiety disorder     Osteopenia     Hearing loss, bilateral     Past Medical History:   Diagnosis Date     Breast cancer (H) 2001    left      Past Surgical  "History:   Procedure Laterality Date     BLADDER REPAIR       BREAST BIOPSY Left 2001     BREAST LUMPECTOMY Left 2001     HYSTERECTOMY        Family History   Problem Relation Age of Onset     Colon cancer Mother       Social History     Socioeconomic History     Marital status: Single     Spouse name: Not on file     Number of children: Not on file     Years of education: Not on file     Highest education level: Not on file   Social Needs     Financial resource strain: Not on file     Food insecurity - worry: Not on file     Food insecurity - inability: Not on file     Transportation needs - medical: Not on file     Transportation needs - non-medical: Not on file   Occupational History     Not on file   Tobacco Use     Smoking status: Never Smoker     Smokeless tobacco: Never Used   Substance and Sexual Activity     Alcohol use: Not on file     Drug use: Not on file     Sexual activity: Not on file   Other Topics Concern     Not on file   Social History Narrative     Not on file      Current Outpatient Medications   Medication Sig Dispense Refill     ASCORBATE CALCIUM (VITAMIN C ORAL) Take by mouth.       b complex vitamins tablet Take 1 tablet by mouth daily.       biotin 1 mg tablet Take 1,000 mcg by mouth 3 (three) times a day.       cholecalciferol, vitamin D3, (VITAMIN D3) 2,000 unit cap Take by mouth.       COPPER ORAL Take by mouth.       LUTEIN ORAL Take by mouth.       MAGNESIUM ORAL Take by mouth.       MILK THISTLE ORAL Take by mouth. rarely       OMEGA-3S/DHA/EPA/FISH OIL (OMEGA 3 ORAL) Take by mouth.       s-adenosylmethionine (JARED-E, ENTERIC COATED,) 400 mg TbEC Take by mouth.       vitamin E 400 unit capsule Take 400 Units by mouth daily.       XANTHAN GUM ORAL Take by mouth.       No current facility-administered medications for this visit.       Objective:   Vital Signs:   Visit Vitals  /62   Pulse 60   Temp 98.1  F (36.7  C)   Resp 16   Ht 5' 7\" (1.702 m)   Wt 141 lb (64 kg)   BMI 22.08 " kg/m         VisionScreening:  No exam data present     PHYSICAL EXAM    Physical Exam:  General Appearance: Alert, cooperative, no distress, appears stated age  Head: Normocephalic, without obvious abnormality, atraumatic  Eyes: PERRL, conjunctiva/corneas clear, EOM's intact  Ears: Normal TM's and external ear canals, both ears  Nose: Nares normal, septum midline,mucosa normal, no drainage  Throat: Lips, mucosa, and tongue normal; teeth and gums normal  Neck: Supple, symmetrical, trachea midline, no adenopathy; thyroid: not enlarged, symmetric, no tenderness/mass/nodules; no carotid bruit  Back: Symmetric, no curvature, ROM normal, no CVA tenderness  Lungs: Clear to auscultation bilaterally, respirations unlabored  Breasts: No breast masses, tenderness, asymmetry, or nipple discharge, surgical scar left breast.  Heart: Regular rate and rhythm, S1 and S2 normal, no murmur, rub, or gallop, Abdomen: Soft, non-tender, bowel sounds active all four quadrants,  no masses, no organomegaly  Extremities: Extremities normal, atraumatic, no cyanosis or edema  Skin: Skin color, texture, turgor normal, no rashes or lesions  Lymph nodes: Cervical, supraclavicular, and axillary nodes normal  Neurologic: Normal          Assessment Results 12/17/2018   Activities of Daily Living No help needed   Instrumental Activities of Daily Living No help needed   Mini Cog Total Score 5   Some recent data might be hidden     A Mini-Cog score of 0-2 suggests the possibility of dementia, score of 3-5 suggests no dementia    Identified Health Risks:     The patient was provided with written information regarding signs of hearing loss.  Information on urinary incontinence and treatment options given to patient.  Patient's advanced directive was discussed and I am comfortable with the patient's wishes.

## 2021-06-26 NOTE — PROGRESS NOTES

## 2021-07-21 ENCOUNTER — TRANSCRIBE ORDERS (OUTPATIENT)
Dept: ENDOCRINOLOGY | Facility: CLINIC | Age: 76
End: 2021-07-21

## 2021-07-21 DIAGNOSIS — Z92.29 PERSONAL HISTORY OF OTHER DRUG THERAPY: ICD-10-CM

## 2021-07-21 DIAGNOSIS — M81.0 SENILE OSTEOPOROSIS: Primary | ICD-10-CM

## 2021-07-21 NOTE — PROGRESS NOTES
As part of the required manual data conversion process for integration, this encounter was created to document a CAM (Clinic Administered Medication) order. This information was copied from the [facility] patient's chart to the [facility] patient chart.     Miranda Murphy PharmD  July 21, 2021

## 2021-07-27 ENCOUNTER — DOCUMENTATION ONLY (OUTPATIENT)
Dept: OTHER | Facility: CLINIC | Age: 76
End: 2021-07-27

## 2021-08-06 NOTE — PATIENT INSTRUCTIONS - HE
Patient Instructions by Nasra Houser MD at 1/13/2021  8:40 AM     Author: Nasra Houser MD Service: -- Author Type: Physician    Filed: 1/13/2021  8:45 AM Encounter Date: 1/13/2021 Status: Signed    : Nasra Houser MD (Physician)         Patient Education   Signs of Hearing Loss  Hearing loss is a problem shared by many people. In fact, it is one of the most common health conditions, particularly as people age. Most people over age 65 have some hearing loss, and by age 80, almost everyone does. Because hearing loss usually occurs slowly over the years, you may not realize your hearing ability has gotten worse.       Have your hearing checked  Contact your [facility] care provider if you:    Have to strain to hear normal conversation.    Have to watch other peoples faces very carefully to follow what theyre saying.    Need to ask people to repeat what theyve said.    Often misunderstand what people are saying.    Turn the volume of the television or radio up so high that others complain.    Feel that people are mumbling when theyre talking to you.    Find that the effort to hear leaves you feeling tired and irritated.    Notice, when using the phone, that you hear better with 1 ear than the other.    2674-5397 [employer]. [address] [place] [address], [place] [address]. All rights reserved. This information is not intended as a substitute for professional medical care. Always follow your healthcare professional's instructions.         Patient Education   Urinary Incontinence, Female (Adult)  Urinary incontinence means loss of control of the bladder. This problem affects many women, especially as they get older. If you have incontinence, you may be embarrassed to ask for help. But know that this problem can be treated.  Types of Incontinence  There are different types of incontinence. Two of the main types are described here. You can have more than one type.    Stress incontinence. With this type, urine  leaks when pressure (stress) is put on the bladder. This may happen when you cough, sneeze, or laugh. Stress incontinence most often occurs because the pelvic floor muscles that support the bladder and urethra are weak. This can happen after pregnancy and vaginal childbirth or a hysterectomy. It can also be due to excess body weight or hormone changes.    Urge incontinence (also called overactive bladder). With this type, a sudden urge to urinate is felt often. This may happen even though there may not be much urine in the bladder. The need to urinate often during the night is common. Urge incontinence most often occurs because of bladder spasms. This may be due to bladder irritation or infection. Damage to bladder nerves or pelvic muscles, constipation, and certain medicines can also lead to urge incontinence.  Treatment of urinary incontinence depends on the cause. Further evaluation is needed to find the type you have. This will likely include an exam and certain tests. Based on the results, you and your healthcare provider can then plan treatment. Until a diagnosis is made, the home care tips below can help relieve symptoms.  Home care    Do pelvic floor muscle exercises, if they are prescribed. The pelvic floor muscles help support the bladder and urethra. Many women find that their symptoms improve when doing special exercises that strengthen these muscles. To do the exercises contract the muscles you would use to stop your stream of urine, but do this when youre not urinating. Hold for 10 seconds, then relax. Repeat 10 to 20 times in a row, at least 3 times a day. Your provider may give you other instructions for how to do the exercises and how often.    Keep a bladder diary. This helps track how often and how much you urinate over a set period of time. Bring this diary with you to your next visit with the provider. The information can help your provider learn more about your bladder problem.    Lose weight,  if advised to by your provider. Excess weight puts pressure on the bladder. Your provider can help you create a weight-loss plan thats right for you. This may include exercising more and making certain diet changes.    Don't consume foods and drinks that may irritate the bladder. These can include alcohol and caffeinated drinks.    Quit smoking. Smoking and other tobacco use can lead to chronic cough that strains the pelvic floor muscles. Smoking may also damage the bladder and urethra. Talk with your provider about treatments or methods you can use to quit smoking.    If drinking large amounts of fluid causes you to have symptoms, you may be advised to limit your fluid intake. You may also be advised to drink most of your fluids during the day and to limit fluids at night.    If youre worried about urine leakage or accidents, you may wear absorbent pads to catch urine. Change the pads often. This helps reduce discomfort. It may also reduce the risk of skin or bladder infections.  Follow-up care  Follow up with your healthcare provider, or as directed. It may take some to find the right treatment for your problem. Your treatment plan may include special therapies or medicines. Certain procedures or surgery may also be options. Be sure to discuss any questions you have with your provider.  When to seek medical advice  Call the healthcare provider right away if any of these occur:    Fever of 100.4 F (38 C) or higher, or as directed by your provider    Bladder pain or fullness    Abdominal swelling    Nausea or vomiting    Back pain    Weakness, dizziness or fainting  Date Last Reviewed: 10/1/2017    9803-0528 [employer]. [address] [place] [address], [place] [address]. All rights reserved. This information is not intended as a substitute for professional medical care. Always follow your healthcare professional's instructions.     Patient Education   Your Health Risk Assessment indicates you feel you are not  in good emotional health.    Recreation   Recreation is not limited to sports and team events. It includes any activity that provides relaxation, interest, enjoyment, and exercise. Recreation provides an outlet for physical, mental, and social energy. It can give a sense of worth and achievement. It can help you stay healthy.    Mental Exercise and Social Involvement  Mental and emotional health is as important as physical health. Keep in touch with friends and family. Stay as active as possible. Continue to learn and challenge yourself.   Things you can do to stay mentally active are:    Learn something new, like a foreign language or musical instrument.     Play SCRABBLE or do crossword puzzles. If you cannot find people to play these games with you at home, you can play them with others on your computer through the Internet.     Join a games club--anything from card games to chess or checkers or lawn bowling.     Start a new hobby.     Go back to school.     Volunteer.     Read.     Keep up with world events.         Advance Directive  Patients advance directive was discussed and I am comfortable with the patients wishes.  Patient Education   Personalized Prevention Plan  You are due for the preventive services outlined below.  Your care team is available to assist you in scheduling these services.  If you have already completed any of these items, please share that information with your care team to update in your medical record.  Health Maintenance   Topic Date Due   ? HEPATITIS C SCREENING  1945   ? LIPID  09/08/2021   ? MEDICARE ANNUAL WELLNESS VISIT  01/13/2022   ? FALL RISK ASSESSMENT  01/13/2022   ? MAMMOGRAM  11/11/2022   ? COLORECTAL CANCER SCREENING  10/22/2023   ? ADVANCE CARE PLANNING  12/19/2024   ? TD 18+ HE  09/18/2030   ? DEXA SCAN  12/20/2033   ? Pneumococcal Vaccine: 65+ Years  Completed   ? INFLUENZA VACCINE RULE BASED  Completed   ? ZOSTER VACCINES  Completed   ? Pneumococcal Vaccine:  Pediatrics (0 to 5 Years) and At-Risk Patients (6 to 64 Years)  Aged Out   ? HEPATITIS B VACCINES  Aged Out

## 2021-09-01 ENCOUNTER — OFFICE VISIT (OUTPATIENT)
Dept: FAMILY MEDICINE | Facility: CLINIC | Age: 76
End: 2021-09-01
Payer: COMMERCIAL

## 2021-09-01 VITALS
HEART RATE: 55 BPM | RESPIRATION RATE: 16 BRPM | BODY MASS INDEX: 22 KG/M2 | TEMPERATURE: 99 F | WEIGHT: 140.2 LBS | HEIGHT: 67 IN | DIASTOLIC BLOOD PRESSURE: 56 MMHG | SYSTOLIC BLOOD PRESSURE: 120 MMHG

## 2021-09-01 DIAGNOSIS — J43.9 PULMONARY EMPHYSEMA, UNSPECIFIED EMPHYSEMA TYPE (H): ICD-10-CM

## 2021-09-01 DIAGNOSIS — C50.919 MALIGNANT NEOPLASM OF FEMALE BREAST, UNSPECIFIED ESTROGEN RECEPTOR STATUS, UNSPECIFIED LATERALITY, UNSPECIFIED SITE OF BREAST (H): ICD-10-CM

## 2021-09-01 DIAGNOSIS — L98.9 SKIN LESION: ICD-10-CM

## 2021-09-01 DIAGNOSIS — M85.80 OSTEOPENIA, UNSPECIFIED LOCATION: ICD-10-CM

## 2021-09-01 DIAGNOSIS — M67.40 GANGLION CYST: Primary | ICD-10-CM

## 2021-09-01 PROCEDURE — 99213 OFFICE O/P EST LOW 20 MIN: CPT | Performed by: FAMILY MEDICINE

## 2021-09-01 ASSESSMENT — MIFFLIN-ST. JEOR: SCORE: 1168.57

## 2021-09-01 NOTE — PROGRESS NOTES
Assessment/ Plan     1. Ganglion cyst  Ruth has noticed a ganglion cyst on her still finger on her right hand.  We discussed conservative measures.  If it is growing or becoming more painful, she will let me know and I will refer her to a hand surgeon.    2. Pulmonary emphysema, unspecified emphysema type (H)  She apparently was diagnosed with emphysema when she had a work-up at [facility].  I am unable to see those test results but she thought she had a breathing test and they also may be noticed something on the CT scan.  She is very active and exercising regularly and very rarely gets any symptoms of shortness of breath.  She quit smoking in 1993, but thinks she smoked for about 40 years.  Once we are able to do office spirometry, will do that when she is in for her annual wellness.  She does not feel like she needs to have an inhaler at this time.    3. Osteopenia, unspecified location  She is following with endocrinology and getting Prolia.    4. Skin lesion  She typically gets a yearly skin check.  She would like a referral to a different dermatologist.  Referral is placed for dermatology consultants.  - Adult Dermatology Referral; Future    5. Malignant neoplasm of female breast, unspecified estrogen receptor status, unspecified laterality, unspecified site of breast (H)  She had a left lumpectomy and has had regular mammograms since then.      Subjective:      Ruth Pinon is a 76 year old female who presents for several concerns today.  The first of which is a lump on the distal aspect of her right finger.  Its not painful.  She does do a lot of movement with her hands.  She was also wondering about doing a spirometry as Epic showed that she was due for it.  I discussed with her that I did not know she had a diagnosis of COPD.  She states when she went down to [facility] for work-up of some dizzy spells, they told her that she had COPD.  She started smoking quite young in the fifth grade, but quit in  "1993.  She is quite active and exercises regularly.  She occasionally will get some shortness of breath on an incline, but is able to function quite well.    Relevant past medical, family, surgical, and social history reviewed with patient, unless noted in HPI, not pertinent for this visit.  Medications were discussed and reconciled.   Review of Systems   A 12 point comprehensive review of systems was negative except as noted.      Current Outpatient Medications   Medication Sig Dispense Refill     ASCORBATE CALCIUM (VITAMIN C ORAL) [ASCORBATE CALCIUM (VITAMIN C ORAL)] Take by mouth.       b complex vitamins tablet [B COMPLEX VITAMINS TABLET] Take 1 tablet by mouth daily.       biotin 1 mg tablet [BIOTIN 1 MG TABLET] Take 1,000 mcg by mouth 3 (three) times a day.       cholecalciferol, vitamin D3, (VITAMIN D3) 2,000 unit cap [CHOLECALCIFEROL, VITAMIN D3, (VITAMIN D3) 2,000 UNIT CAP] Take by mouth.       COPPER ORAL [COPPER ORAL] Take by mouth.       LACTOBACILLUS REUTERI PO L. Reuteri plus       levothyroxine (SYNTHROID, LEVOTHROID) 25 MCG tablet [LEVOTHYROXINE (SYNTHROID, LEVOTHROID) 25 MCG TABLET] Take 1 tablet (25 mcg total) by mouth daily. 90 tablet 3     LUTEIN ORAL [LUTEIN ORAL] Take by mouth.       MAGNESIUM ORAL [MAGNESIUM ORAL] Take by mouth.       OMEGA-3S/DHA/EPA/FISH OIL (OMEGA 3 ORAL) [OMEGA-3S/DHA/EPA/FISH OIL (OMEGA 3 ORAL)] Take by mouth.       s-adenosylmethionine (JARED-E, ENTERIC COATED,) 400 mg TbEC [S-ADENOSYLMETHIONINE (JARED-E, ENTERIC COATED,) 400 MG TBEC] Take by mouth.       UNABLE TO FIND [UNABLE TO FIND] Med Name: Brain supplement       vitamin E 400 unit capsule [VITAMIN E 400 UNIT CAPSULE] Take 400 Units by mouth daily.       XANTHAN GUM ORAL [XANTHAN GUM ORAL] Take by mouth.           Objective:     /56   Pulse 55   Temp 99  F (37.2  C)   Resp 16   Ht 1.702 m (5' 7\")   Wt 63.6 kg (140 lb 3.2 oz)   LMP  (LMP Unknown)   BMI 21.96 kg/m      Body mass index is 21.96 kg/m . "       General appearance: alert, appears stated age and cooperative  Lungs: clear to auscultation bilaterally  Heart: regular rate and rhythm, S1, S2 normal, no murmur, click, rub or gallop  Extremities: Distal right finger has a soft, mobile, mass near the DIP joint    No results found for this or any previous visit (from the past 168 hour(s)).       This note has been dictated using voice recognition software. Any grammatical or context distortions are unintentional and inherent to the software

## 2021-10-10 ENCOUNTER — HEALTH MAINTENANCE LETTER (OUTPATIENT)
Age: 76
End: 2021-10-10

## 2021-12-04 ASSESSMENT — ENCOUNTER SYMPTOMS
HYPERTENSION: 0
FLANK PAIN: 0
HEMOPTYSIS: 0
BRUISES/BLEEDS EASILY: 1
SLEEP DISTURBANCES DUE TO BREATHING: 0
HOARSE VOICE: 1
MYALGIAS: 0
NAIL CHANGES: 0
SWOLLEN GLANDS: 0
BACK PAIN: 1
SPUTUM PRODUCTION: 1
MUSCLE CRAMPS: 1
BREAST PAIN: 1
POOR WOUND HEALING: 0
SMELL DISTURBANCE: 0
TROUBLE SWALLOWING: 0
BREAST MASS: 0
WHEEZING: 0
STIFFNESS: 0
NECK MASS: 0
COUGH DISTURBING SLEEP: 0
MUSCLE WEAKNESS: 1
JOINT SWELLING: 0
SNORES LOUDLY: 0
HEMATURIA: 0
EYE IRRITATION: 1
POSTURAL DYSPNEA: 0
PALPITATIONS: 0
EXERCISE INTOLERANCE: 0
DOUBLE VISION: 0
LIGHT-HEADEDNESS: 1
COUGH: 0
SINUS PAIN: 0
EYE REDNESS: 1
NECK PAIN: 1
ARTHRALGIAS: 0
SINUS CONGESTION: 1
SYNCOPE: 0
DIFFICULTY URINATING: 0
LEG PAIN: 0
DYSPNEA ON EXERTION: 1
EYE WATERING: 1
TASTE DISTURBANCE: 0
SHORTNESS OF BREATH: 1
HYPOTENSION: 0
DYSURIA: 0
SORE THROAT: 0
EYE PAIN: 1
SKIN CHANGES: 0
ORTHOPNEA: 0

## 2021-12-07 ENCOUNTER — ALLIED HEALTH/NURSE VISIT (OUTPATIENT)
Dept: ENDOCRINOLOGY | Facility: CLINIC | Age: 76
End: 2021-12-07
Payer: COMMERCIAL

## 2021-12-07 DIAGNOSIS — Z92.29 PERSONAL HISTORY OF OTHER DRUG THERAPY: ICD-10-CM

## 2021-12-07 DIAGNOSIS — M81.0 SENILE OSTEOPOROSIS: Primary | ICD-10-CM

## 2021-12-07 DIAGNOSIS — M81.0 OSTEOPOROSIS WITHOUT CURRENT PATHOLOGICAL FRACTURE, UNSPECIFIED OSTEOPOROSIS TYPE: ICD-10-CM

## 2021-12-07 PROCEDURE — 99207 PR NO CHARGE NURSE ONLY: CPT

## 2021-12-07 PROCEDURE — 96372 THER/PROPH/DIAG INJ SC/IM: CPT | Performed by: NURSE PRACTITIONER

## 2021-12-07 NOTE — PROGRESS NOTES
"Prolia Injection Phone Screen      Screening questions have been asked 2-3 days prior to administration visit for Prolia. If any questions are answered with \"Yes,\" this phone encounter were will routed to ordering provider for further evaluation.     1.  When was the last injection?  06/07/21    2.  Has insurance for this injection been verified?  Yes    3.  Did you experience any new onset achiness or rashes that lasted for over a month with your previous Prolia injection?   No    4.  Do you have a fever over 101?F or a new deep cough that is unusual for you today? No    5.  Have you started any new medications in the last 6 months that you were told could affect your immune system? These may have been prescribed by oncologist, transplant, rheumatology, or dermatology.   No    6.  In the last 6 months have you have gastric bypass or parathyroid surgery?   No    7.  Do you plan dental work requiring drilling into the bone such as implants/extractions or oral surgery in the next 2-3 months?   No    8. Do you have new insurance since the last injection? No    Patient informed if symptoms discussed above present prior to their administration appointment, they are to notify clinic immediately.     Radha MILLER RN          The following steps were completed to comply with the REMS program for Prolia:  1. Ordering provider has previously reviewed information in the Medication Guide and Patient Counseling Chart, including the serious risks of Prolia  and the symptoms of each risk and have been advised to seek prompt medical attention if they have signs or symptoms of any of the serious risks.  2. Provided each patient a copy of the Medication Guide and Patient Brochure.  See MAR for administration details.   Indication: Prolia  (denosumab) is a prescription medicine used to treat osteoporosis in patients who:   Are at high risk for fracture, meaning patients who have had a fracture related to osteoporosis, or who " have multiple risk factors for fracture; Cannot use another osteoporosis medicine or other osteoporosis medicines did not work well.   The timeline for early/late injections would be 4 weeks early and any time after the 6 month alayna. If a patient receives their injection late, then the subsequent injection would be 6 months from the date that they actually received the injection    Have the screening questions been asked prior to this administration? Yes    The following medication was given:     MEDICATION: Denosumab (Prolia) 60 mg/ml SOLN  ROUTE: SQ  SITE: Arm - Right  DOSE: 60mg  LOT #: [id number]  :  Artimi  EXPIRATION DATE:  04/24

## 2021-12-27 ENCOUNTER — ANCILLARY PROCEDURE (OUTPATIENT)
Dept: MAMMOGRAPHY | Facility: CLINIC | Age: 76
End: 2021-12-27
Attending: FAMILY MEDICINE
Payer: COMMERCIAL

## 2021-12-27 DIAGNOSIS — Z12.31 VISIT FOR SCREENING MAMMOGRAM: ICD-10-CM

## 2021-12-27 PROCEDURE — 77063 BREAST TOMOSYNTHESIS BI: CPT

## 2022-01-07 ENCOUNTER — ANCILLARY PROCEDURE (OUTPATIENT)
Dept: MAMMOGRAPHY | Facility: CLINIC | Age: 77
End: 2022-01-07
Attending: FAMILY MEDICINE
Payer: COMMERCIAL

## 2022-01-07 DIAGNOSIS — R92.0 MICROCALCIFICATION OF BREAST: ICD-10-CM

## 2022-01-07 PROCEDURE — 272N000715 MA STEREOTACTIC BREAST BIOPSY VACUUM LT

## 2022-01-07 PROCEDURE — 250N000009 HC RX 250: Performed by: FAMILY MEDICINE

## 2022-01-07 PROCEDURE — 88305 TISSUE EXAM BY PATHOLOGIST: CPT | Mod: TC | Performed by: FAMILY MEDICINE

## 2022-01-07 PROCEDURE — 77065 DX MAMMO INCL CAD UNI: CPT | Mod: LT

## 2022-01-07 RX ORDER — LIDOCAINE HYDROCHLORIDE AND EPINEPHRINE 10; 10 MG/ML; UG/ML
10 INJECTION, SOLUTION INFILTRATION; PERINEURAL ONCE
Status: COMPLETED | OUTPATIENT
Start: 2022-01-07 | End: 2022-01-07

## 2022-01-07 RX ADMIN — LIDOCAINE HYDROCHLORIDE 10 ML: 10 INJECTION, SOLUTION EPIDURAL; INFILTRATION; INTRACAUDAL; PERINEURAL at 10:50

## 2022-01-07 RX ADMIN — LIDOCAINE HYDROCHLORIDE,EPINEPHRINE BITARTRATE 10 ML: 10; .01 INJECTION, SOLUTION INFILTRATION; PERINEURAL at 10:52

## 2022-01-10 ENCOUNTER — TELEPHONE (OUTPATIENT)
Dept: MAMMOGRAPHY | Facility: CLINIC | Age: 77
End: 2022-01-10

## 2022-01-10 LAB
PATH REPORT.COMMENTS IMP SPEC: NORMAL
PATH REPORT.FINAL DX SPEC: NORMAL
PATH REPORT.GROSS SPEC: NORMAL
PATH REPORT.RELEVANT HX SPEC: NORMAL
PHOTO IMAGE: NORMAL

## 2022-01-10 PROCEDURE — 88305 TISSUE EXAM BY PATHOLOGIST: CPT | Mod: 26 | Performed by: PATHOLOGY

## 2022-01-10 NOTE — TELEPHONE ENCOUNTER
Breast Center Radiologist, Dr. Lopes, confirms the recent breast imaging is concordant with the final pathology results.    Per radiologist, I phoned patient and discussed the benign biopsy results and that she may return to routine breast screening.      Patient denies any concerns at her biopsy site. Questions were answered and my phone number given if she has further questions or concerns.  Ordering provider was informed of the results and follow up plan.  Patient verbalized understanding and agrees with the plan of care.       Cora Jonas, RN, BSN, PHN  Breast Center Imaging Nurse Coordinator   [facility]  [address]  [place] [address]  [phone]

## 2022-01-26 ENCOUNTER — TRANSFERRED RECORDS (OUTPATIENT)
Dept: HEALTH INFORMATION MANAGEMENT | Facility: CLINIC | Age: 77
End: 2022-01-26
Payer: COMMERCIAL

## 2022-02-10 ASSESSMENT — ACTIVITIES OF DAILY LIVING (ADL): CURRENT_FUNCTION: NO ASSISTANCE NEEDED

## 2022-02-15 ENCOUNTER — OFFICE VISIT (OUTPATIENT)
Dept: FAMILY MEDICINE | Facility: CLINIC | Age: 77
End: 2022-02-15
Payer: COMMERCIAL

## 2022-02-15 VITALS
HEIGHT: 67 IN | BODY MASS INDEX: 22.41 KG/M2 | RESPIRATION RATE: 16 BRPM | OXYGEN SATURATION: 100 % | HEART RATE: 70 BPM | SYSTOLIC BLOOD PRESSURE: 132 MMHG | WEIGHT: 142.8 LBS | DIASTOLIC BLOOD PRESSURE: 53 MMHG

## 2022-02-15 DIAGNOSIS — Z13.220 LIPID SCREENING: ICD-10-CM

## 2022-02-15 DIAGNOSIS — Z00.00 ENCOUNTER FOR MEDICARE ANNUAL WELLNESS EXAM: Primary | ICD-10-CM

## 2022-02-15 DIAGNOSIS — M85.80 OSTEOPENIA, UNSPECIFIED LOCATION: ICD-10-CM

## 2022-02-15 DIAGNOSIS — J43.9 PULMONARY EMPHYSEMA, UNSPECIFIED EMPHYSEMA TYPE (H): ICD-10-CM

## 2022-02-15 DIAGNOSIS — C50.919 MALIGNANT NEOPLASM OF FEMALE BREAST, UNSPECIFIED ESTROGEN RECEPTOR STATUS, UNSPECIFIED LATERALITY, UNSPECIFIED SITE OF BREAST (H): ICD-10-CM

## 2022-02-15 DIAGNOSIS — E04.9 THYROID GOITER: ICD-10-CM

## 2022-02-15 LAB
ALBUMIN SERPL-MCNC: 3.8 G/DL (ref 3.5–5)
ALP SERPL-CCNC: 51 U/L (ref 45–120)
ALT SERPL W P-5'-P-CCNC: 18 U/L (ref 0–45)
ANION GAP SERPL CALCULATED.3IONS-SCNC: 12 MMOL/L (ref 5–18)
AST SERPL W P-5'-P-CCNC: 24 U/L (ref 0–40)
BILIRUB SERPL-MCNC: 0.3 MG/DL (ref 0–1)
BUN SERPL-MCNC: 19 MG/DL (ref 8–28)
CALCIUM SERPL-MCNC: 10 MG/DL (ref 8.5–10.5)
CHLORIDE BLD-SCNC: 104 MMOL/L (ref 98–107)
CHOLEST SERPL-MCNC: 189 MG/DL
CO2 SERPL-SCNC: 24 MMOL/L (ref 22–31)
CREAT SERPL-MCNC: 0.89 MG/DL (ref 0.6–1.1)
FASTING STATUS PATIENT QL REPORTED: YES
GFR SERPL CREATININE-BSD FRML MDRD: 68 ML/MIN/1.73M2
GLUCOSE BLD-MCNC: 97 MG/DL (ref 70–125)
HDLC SERPL-MCNC: 53 MG/DL
LDLC SERPL CALC-MCNC: 99 MG/DL
POTASSIUM BLD-SCNC: 4 MMOL/L (ref 3.5–5)
PROT SERPL-MCNC: 7.1 G/DL (ref 6–8)
SODIUM SERPL-SCNC: 140 MMOL/L (ref 136–145)
TRIGL SERPL-MCNC: 183 MG/DL

## 2022-02-15 PROCEDURE — 80061 LIPID PANEL: CPT | Performed by: FAMILY MEDICINE

## 2022-02-15 PROCEDURE — 36415 COLL VENOUS BLD VENIPUNCTURE: CPT | Performed by: FAMILY MEDICINE

## 2022-02-15 PROCEDURE — 80053 COMPREHEN METABOLIC PANEL: CPT | Performed by: FAMILY MEDICINE

## 2022-02-15 PROCEDURE — 99397 PER PM REEVAL EST PAT 65+ YR: CPT | Performed by: FAMILY MEDICINE

## 2022-02-15 RX ORDER — NIACIN 500 MG
TABLET ORAL DAILY
COMMUNITY
End: 2022-05-10

## 2022-02-15 ASSESSMENT — MIFFLIN-ST. JEOR: SCORE: 1172.43

## 2022-02-15 ASSESSMENT — ACTIVITIES OF DAILY LIVING (ADL): CURRENT_FUNCTION: NO ASSISTANCE NEEDED

## 2022-02-15 NOTE — PATIENT INSTRUCTIONS
Patient Education   Personalized Prevention Plan  You are due for the preventive services outlined below.  Your care team is available to assist you in scheduling these services.  If you have already completed any of these items, please share that information with your care team to update in your medical record.  Health Maintenance Due   Topic Date Due     Breathing Capacity Test  Never done     ANNUAL REVIEW OF HM ORDERS  Never done     COPD Action Plan  Never done     FALL RISK ASSESSMENT  01/13/2022       Signs of Hearing Loss      Hearing much better with one ear can be a sign of hearing loss.   Hearing loss is a problem shared by many people. In fact, it is one of the most common health problems, particularly as people age. Most people age 65 and older have some hearing loss. By age 80, almost everyone does. Hearing loss often occurs slowly over the years. So you may not realize your hearing has gotten worse.  Have your hearing checked  Call your healthcare provider if you:    Have to strain to hear normal conversation    Have to watch other people s faces very carefully to follow what they re saying    Need to ask people to repeat what they ve said    Often misunderstand what people are saying    Turn the volume of the television or radio up so high that others complain    Feel that people are mumbling when they re talking to you    Find that the effort to hear leaves you feeling tired and irritated    Notice, when using the phone, that you hear better with one ear than the other  [employer] last reviewed this educational content on 1/1/2020 2000-2021 [employer]. All rights reserved. This information is not intended as a substitute for professional medical care. Always follow your healthcare professional's instructions.          Urinary Incontinence, Female (Adult)   Urinary incontinence means loss of bladder control. This problem affects many women, especially as they get older. If you have  incontinence, you may be embarrassed to ask for help. But know that this problem can be treated.   Types of Incontinence  There are different types of incontinence. Two of the main types are described here. You can have more than one type.     Stress incontinence. With this type, urine leaks when pressure (stress) is put on the bladder. This may happen when you cough, sneeze, or laugh. Stress incontinence most often occurs because the pelvic floor muscles that support the bladder and urethra are weak. This can happen after pregnancy and vaginal childbirth or a hysterectomy. It can also be due to excess body weight or hormone changes.    Urge incontinence (also called overactive bladder). With this type, a sudden urge to urinate is felt often. This may happen even though there may not be much urine in the bladder. The need to urinate often during the night is common. Urge incontinence most often occurs because of bladder spasms. This may be due to bladder irritation or infection. Damage to bladder nerves or pelvic muscles, constipation, and certain medicines can also lead to urge incontinence.  Treatment depends on the cause. Further evaluation is needed to find the type you have. This will likely include an exam and certain tests. Based on the results, you and your healthcare provider can then plan treatment. Until a diagnosis is made, the home care tips below can help ease symptoms.   Home care    Do pelvic floor muscle exercises, if they are prescribed. The pelvic floor muscles help support the bladder and urethra. Many women find that their symptoms improve when doing special exercises that strengthen these muscles. To do the exercises, contract the muscles you would use to stop your stream of urine. But do this when you re not urinating. Hold for 10 seconds, then relax. Repeat 10 to 20 times in a row, at least 3 times a day. Your healthcare provider may give you other instructions for how to do the exercises and  how often.    Keep a bladder diary. This helps track how often and how much you urinate over a set period of time. Bring this diary with you to your next visit with the provider. The information can help your provider learn more about your bladder problem.    Lose weight, if advised to by your provider. Extra weight puts pressure on the bladder. Your provider can help you create a weight-loss plan that s right for you. This may include exercising more and making certain diet changes.    Don't have foods and drinks that may irritate the bladder. These can include alcohol and caffeinated drinks.    Quit smoking. Smoking and other tobacco use can lead to a long-term (chronic) cough that strains the pelvic floor muscles. Smoking may also damage the bladder and urethra. Talk with your provider about treatments or methods you can use to quit smoking.    If drinking large amounts of fluid makes you have symptoms, you may be advised to limit your fluid intake. You may also be advised to drink most of your fluids during the day and to limit fluids at night.    If you re worried about urine leakage or accidents, you may wear absorbent pads to catch urine. Change the pads often. This helps reduce discomfort. It may also reduce the risk of skin or bladder infections.    Follow-up care  Follow up with your healthcare provider, or as directed. It may take some to find the right treatment for your problem. But healthy lifestyle changes can be made right away. These include such things as exercising on a regular basis, eating a healthy diet, losing weight (if needed), and quitting smoking. Your treatment plan may include special therapies or medicines. Certain procedures or surgery may also be options. Talk about any questions you have with your provider.   When to seek medical advice  Call the healthcare provider right away if any of these occur:    Fever of 100.4 F (38 C) or higher, or as directed by your provider    Bladder pain  or fullness    Belly swelling    Nausea or vomiting    Back pain    Weakness, dizziness, or fainting  [employer] last reviewed this educational content on 1/1/2020 2000-2021 [employer]. All rights reserved. This information is not intended as a substitute for professional medical care. Always follow your healthcare professional's instructions.

## 2022-02-15 NOTE — PROGRESS NOTES
"SUBJECTIVE:   Ruth Pinon is a 76 year old female who presents for Preventive Visit.      Patient has been advised of split billing requirements and indicates understanding: Yes  Are you in the first 12 months of your Medicare coverage?  No    Healthy Habits:     In general, how would you rate your overall health?  Good    Frequency of exercise:  4-5 days/week    Duration of exercise:  30-45 minutes    Do you usually eat at least 4 servings of fruit and vegetables a day, include whole grains    & fiber and avoid regularly eating high fat or \"junk\" foods?  Yes    Taking medications regularly:  Yes    Medication side effects:  Other    Ability to successfully perform activities of daily living:  No assistance needed    Home Safety:  Throw rugs in the hallway and lack of grab bars in the bathroom    Hearing Impairment:  Difficulty following a conversation in a noisy restaurant or crowded room, feel that people are mumbling or not speaking clearly, need to ask people to speak up or repeat themselves and difficulty understanding soft or whispered speech    In the past 6 months, have you been bothered by leaking of urine? Yes    In general, how would you rate your overall mental or emotional health?  Good      PHQ-2 Total Score: 2    Additional concerns today:  Yes    Do you feel safe in your environment? Yes     Ruth presents for her annual wellness exam.  Overall, she states she been doing fairly well.  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  She has been experimenting with different filters and that is been helpful.  She still sees endocrinology for her Prolia.  She said 2 injections so far.  She is due to have another one this spring.  She has developed a rash on her chest on 2 separate occasions and she was not sure if it was may be related to the Prolia.  She did see a dermatologist and had a biopsy and they did not think it was autoimmune.  They gave her " steroid cream and that is been helping.  She has a history of breast cancer and recently had a breast biopsy which was negative.  She had been diagnosed at [facility] with COPD.  She believes that she had complete pulmonary function testing there.  She is wondering what her gold stage is.  I discussed with her that I need to see her PFTs to give her a better answer with that.  She currently really does not have much in the way of symptoms.  Sometimes when she is hiking she will feel little bit short of breath.  She is not using any inhalers currently.  She quit smoking in 1993 but had about a 40-pack-year history prior to that.    Have you ever done Advance Care Planning? (For example, a Health Directive, POLST, or a discussion with a medical provider or your loved ones about your wishes): Yes, advance care planning is on file.       Fall risk  Fallen 2 or more times in the past year?: No  Any fall with injury in the past year?: No    Cognitive Screening   1) Repeat 3 items (Leader, Season, Table)    2) Clock draw: NORMAL  3) 3 item recall: Recalls 3 objects  Results: 3 items recalled: COGNITIVE IMPAIRMENT LESS LIKELY    Mini-CogTM Copyright S Jaimie. Licensed by the author for use in [facility]; reprinted with permission (soob@.[facility]). All rights reserved.      Do you have sleep apnea, excessive snoring or daytime drowsiness?: no    Reviewed and updated as needed this visit by clinical staff  Tobacco  Allergies  Meds             Reviewed and updated as needed this visit by Provider               Social History     Tobacco Use     Smoking status: Never Smoker     Smokeless tobacco: Never Used   Substance Use Topics     Alcohol use: Not on file         Alcohol Use 2/10/2022   Prescreen: >3 drinks/day or >7 drinks/week? No               Current providers sharing in care for this patient include:   Patient Care Team:  Nasra Houser as PCP - General  Nasra Houser as Assigned PCP  Cora Bourne NP as  "Nurse Practitioner    The following health maintenance items are reviewed in Epic and correct as of today:  Health Maintenance Due   Topic Date Due     SPIROMETRY  Never done     ANNUAL REVIEW OF HM ORDERS  Never done     COPD ACTION PLAN  Never done     FALL RISK ASSESSMENT  01/13/2022     Lab work is in process        Review of Systems  Constitutional, HEENT, cardiovascular, pulmonary, GI, , musculoskeletal, neuro, skin, endocrine and psych systems are negative, except as otherwise noted.    OBJECTIVE:   /53   Pulse 70   Resp 16   Ht 1.689 m (5' 6.5\")   Wt 64.8 kg (142 lb 12.8 oz)   LMP  (LMP Unknown)   SpO2 100%   BMI 22.70 kg/m   Estimated body mass index is 22.7 kg/m  as calculated from the following:    Height as of this encounter: 1.689 m (5' 6.5\").    Weight as of this encounter: 64.8 kg (142 lb 12.8 oz).  Physical Exam  GENERAL APPEARANCE: healthy, alert and no distress  EYES: Eyes grossly normal to inspection, PERRL and conjunctivae and sclerae normal  HENT: ear canals and TM's normal, nose and mouth without ulcers or lesions, oropharynx clear and oral mucous membranes moist  NECK: no adenopathy, no asymmetry, masses, or scars and thyroid normal to palpation  RESP: lungs clear to auscultation - no rales, rhonchi or wheezes  BREAST: normal without masses, tenderness or nipple discharge and no palpable axillary masses or adenopathy, lumpectomy scar left breast  CV: regular rate and rhythm, normal S1 S2, no S3 or S4, no murmur, click or rub, no peripheral edema and peripheral pulses strong  ABDOMEN: soft, nontender, no hepatosplenomegaly, no masses and bowel sounds normal  MS: no musculoskeletal defects are noted and gait is age appropriate without ataxia  SKIN: no suspicious lesions or rashes  NEURO: Normal strength and tone, sensory exam grossly normal, mentation intact and speech normal  PSYCH: mentation appears normal and affect normal/bright    Diagnostic Test Results:  Labs reviewed in " "Epic  No results found for this or any previous visit (from the past 24 hour(s)).    ASSESSMENT / PLAN:   1. Encounter for Medicare annual wellness exam  Ruth presents for her annual wellness exam.  As far as healthcare maintenance, she just had her mammogram.  She is up-to-date on Cologuard.  She is up-to-date on immunizations.    2. Malignant neoplasm of female breast, unspecified estrogen receptor status, unspecified laterality, unspecified site of breast (H)  She has a history of a left breast lumpectomy.  She just had her mammogram and a biopsy which was normal.    3. Thyroid goiter  She has a history of post ablative hypothyroidism.  Her endocrinologist started her on a low-dose of Synthroid which she had adverse side effects so she stopped taking it.  She is due to have this rechecked today.    4. Osteopenia, unspecified location  She is following with endocrine and has had 2 Prolia injections.  She is due to have another 1 in the spring.  Endocrinology did place some labs that will be drawn today.    5. Pulmonary emphysema, unspecified emphysema type (H)  This was diagnosed at [facility].  Patient is wanting to know her gold staging.  We will send for the PFTs so I can give her more information.  She is currently asymptomatic.    6. Lipid screening  Fasting labs.  - Comprehensive metabolic panel; Future  - Lipid panel reflex to direct LDL Fasting; Future  - Comprehensive metabolic panel  - Lipid panel reflex to direct LDL Fasting        COUNSELING:  Reviewed preventive health counseling, as reflected in patient instructions       Regular exercise       Healthy diet/nutrition       Osteoporosis prevention/bone health    Estimated body mass index is 22.7 kg/m  as calculated from the following:    Height as of this encounter: 1.689 m (5' 6.5\").    Weight as of this encounter: 64.8 kg (142 lb 12.8 oz).    Weight management plan noted, stable and monitoring    She reports that she has never smoked. She has " never used smokeless tobacco.      Appropriate preventive services were discussed with this patient, including applicable screening as appropriate for cardiovascular disease, diabetes, osteopenia/osteoporosis, and glaucoma.  As appropriate for age/gender, discussed screening for colorectal cancer, prostate cancer, breast cancer, and cervical cancer. Checklist reviewing preventive services available has been given to the patient.    Reviewed patients plan of care and provided an AVS. The Basic Care Plan (routine screening as documented in Health Maintenance) for Ruth meets the Care Plan requirement. This Care Plan has been established and reviewed with the Patient.    Counseling Resources:  ATP IV Guidelines  Pooled Cohorts Equation Calculator  Breast Cancer Risk Calculator  Breast Cancer: Medication to Reduce Risk  FRAX Risk Assessment  ICSI Preventive Guidelines  Dietary Guidelines for Americans, 2010  USDA's MyPlate  ASA Prophylaxis  Lung CA Screening    Nasra Houser  [facility]    Identified Health Risks:

## 2022-04-05 ENCOUNTER — TRANSFERRED RECORDS (OUTPATIENT)
Dept: HEALTH INFORMATION MANAGEMENT | Facility: CLINIC | Age: 77
End: 2022-04-05
Payer: COMMERCIAL

## 2022-04-13 ENCOUNTER — MYC MEDICAL ADVICE (OUTPATIENT)
Dept: FAMILY MEDICINE | Facility: CLINIC | Age: 77
End: 2022-04-13
Payer: COMMERCIAL

## 2022-04-15 ENCOUNTER — TELEPHONE (OUTPATIENT)
Dept: ENDOCRINOLOGY | Facility: CLINIC | Age: 77
End: 2022-04-15
Payer: COMMERCIAL

## 2022-04-15 DIAGNOSIS — M85.80 OSTEOPENIA, UNSPECIFIED LOCATION: Primary | ICD-10-CM

## 2022-04-15 DIAGNOSIS — E03.9 HYPOTHYROIDISM: ICD-10-CM

## 2022-04-15 NOTE — TELEPHONE ENCOUNTER
Patient has upcoming virtual appointment with Rachelle Bourne NP in Endocrinology on 4/27/22.  The patient has notcompleted ordered labs.  Lab orders are in the chart.    Please call patient to schedule a lab appointment at least 48 hours prior to clinic appointment or have patient reschedule clinic appointment with a lab appointment preferably 1 week prior.Thank you.

## 2022-04-26 NOTE — TELEPHONE ENCOUNTER
On review of chart it is noted that patient has not completed recommended labs for upcoming virtual Endocrinology appointment to follow up on Hypothyroidism and Osteoporosis.    Lab orders are confirmed to be in the chart.     Called and left voicemail  to please reschedule appointment for another time when labs can be completed about 1 week prior to visit per providers request.  Scheduling number given of [phone].

## 2022-04-27 ENCOUNTER — TELEPHONE (OUTPATIENT)
Dept: ENDOCRINOLOGY | Facility: CLINIC | Age: 77
End: 2022-04-27
Payer: COMMERCIAL

## 2022-04-27 NOTE — TELEPHONE ENCOUNTER
Spoke with patient.  Patient states she has rescheduled her visit with Endocrinology with a lab appointment prior to the appointment.  Patient states when she received her first initial call from scheduling, it was not made clear that she was in need of labs prior to her original appointment.  Apologized to the patient and informed patient that this will be passed along as a learning opportunity.

## 2022-04-27 NOTE — TELEPHONE ENCOUNTER
Courtesy call made to see if contact could be made with patient to assist with rescheduling virtual appointment with lab 1 week prior.  Unfortunately, patient did not complete required labs prior to virtual visit originally scheduled for 4/27/22.  Left voicemail for patient to call us back to reschedule these appointments.  Lab orders are in the chart.  Patient does not appear to be using her MyChart currently.

## 2022-04-27 NOTE — TELEPHONE ENCOUNTER
[facility] Call Center    Phone Message    May a detailed message be left on voicemail: yes     Reason for Call: Other: Pt would like a call back from Noris regarding messages left for her.  There seems to be some confusion, per pt.  Please call back ASAP.      Action Taken: Message routed to:  Other: endo    Travel Screening: Not Applicable

## 2022-05-04 NOTE — TELEPHONE ENCOUNTER
Patient called to take the waitlist appointment that was offered for 5/10/22. Patient wondering if she can complete labs on Thursday or Friday of this week and have that appointment on the following Tuesday. Please call back and advise. Thank you.

## 2022-05-05 ASSESSMENT — ENCOUNTER SYMPTOMS
SMELL DISTURBANCE: 0
SWOLLEN GLANDS: 0
DECREASED APPETITE: 0
TROUBLE SWALLOWING: 0
WEIGHT LOSS: 0
DIFFICULTY URINATING: 0
ARTHRALGIAS: 1
POOR WOUND HEALING: 0
POLYPHAGIA: 0
SINUS CONGESTION: 1
DEPRESSION: 0
HALLUCINATIONS: 0
DYSURIA: 0
HOARSE VOICE: 1
TASTE DISTURBANCE: 0
FLANK PAIN: 0
MYALGIAS: 0
EYE REDNESS: 1
POLYDIPSIA: 0
MUSCLE WEAKNESS: 0
MUSCLE CRAMPS: 0
JOINT SWELLING: 0
PANIC: 0
ALTERED TEMPERATURE REGULATION: 1
HEMATURIA: 0
SKIN CHANGES: 0
NERVOUS/ANXIOUS: 1
FATIGUE: 1
EYE WATERING: 1
EYE IRRITATION: 1
EYE PAIN: 1
INSOMNIA: 0
CHILLS: 1
SINUS PAIN: 0
WEIGHT GAIN: 0
BRUISES/BLEEDS EASILY: 1
FEVER: 0
SORE THROAT: 1
NECK PAIN: 0
STIFFNESS: 0
DECREASED CONCENTRATION: 1
INCREASED ENERGY: 1
NAIL CHANGES: 0
NIGHT SWEATS: 0
BACK PAIN: 1
NECK MASS: 0

## 2022-05-06 ENCOUNTER — LAB (OUTPATIENT)
Dept: LAB | Facility: CLINIC | Age: 77
End: 2022-05-06
Payer: COMMERCIAL

## 2022-05-06 DIAGNOSIS — M85.80 OSTEOPENIA, UNSPECIFIED LOCATION: ICD-10-CM

## 2022-05-06 DIAGNOSIS — E03.9 HYPOTHYROIDISM: ICD-10-CM

## 2022-05-06 LAB
T4 FREE SERPL-MCNC: 1.03 NG/DL (ref 0.7–1.8)
TSH SERPL DL<=0.005 MIU/L-ACNC: 1.83 UIU/ML (ref 0.3–5)

## 2022-05-06 PROCEDURE — 84443 ASSAY THYROID STIM HORMONE: CPT

## 2022-05-06 PROCEDURE — 82306 VITAMIN D 25 HYDROXY: CPT

## 2022-05-06 PROCEDURE — 36415 COLL VENOUS BLD VENIPUNCTURE: CPT

## 2022-05-06 PROCEDURE — 84439 ASSAY OF FREE THYROXINE: CPT

## 2022-05-09 LAB — DEPRECATED CALCIDIOL+CALCIFEROL SERPL-MC: 60 UG/L (ref 20–75)

## 2022-05-10 ENCOUNTER — VIRTUAL VISIT (OUTPATIENT)
Dept: ENDOCRINOLOGY | Facility: CLINIC | Age: 77
End: 2022-05-10
Payer: COMMERCIAL

## 2022-05-10 DIAGNOSIS — E04.9 THYROID GOITER: ICD-10-CM

## 2022-05-10 DIAGNOSIS — M81.0 AGE-RELATED OSTEOPOROSIS WITHOUT CURRENT PATHOLOGICAL FRACTURE: Primary | ICD-10-CM

## 2022-05-10 PROBLEM — H35.3130 AGE-RELATED MACULAR DEGENERATION, DRY, BOTH EYES: Status: ACTIVE | Noted: 2022-05-10

## 2022-05-10 PROCEDURE — 99214 OFFICE O/P EST MOD 30 MIN: CPT | Mod: 95 | Performed by: NURSE PRACTITIONER

## 2022-05-10 RX ORDER — DUTASTERIDE 0.5 MG/1
0.5 CAPSULE, LIQUID FILLED ORAL DAILY
COMMUNITY
End: 2024-02-20

## 2022-05-10 NOTE — PROGRESS NOTES
"Ruth is a 76 year old who is being evaluated via a billable video visit.      How would you like to obtain your AVS? MyChart  If the video visit is dropped, the invitation should be resent by: Text to cell phone: [phone]  Will anyone else be joining your video visit? No      Video Start Time:1330    [facility]  ENDOCRINOLOGY    Osteoporosis Follow Up 5/11/2022    Ruth Pinon, 1945, [id number]          Reason for visit      1. Age-related osteoporosis without current pathological fracture    2. Thyroid goiter        History     Ruth Pinon is a very pleasant 76 year old old female who presents for follow up.   SUMMARY:  Ruth is contacted today via Video visit to establish care for Osteoporosis and Postablative Hypothyroidism. She has a family hx of Osteo in her mother. She was a smoker, but quit some time ago. She has used Bisphosphonates in the past, but is not on anything currently. She has the unfortunate hx of Breast Cancer with Aromatase inhibitor and Radiation treatment. She has had a ISAIAH about the same time that she would be experiencing Menopause. Her most recent Dexa scan showed that she had lost BMD in both hips, and that her L forearm density is quite low at -3.7.  She is interested in starting Prolia.     TODAY:  Ruth is contacted today via Video Visit in f/u for Osteoporosis and Thyroid goiter. She reports that she retired last September. She found a rash on her chest and was concerned regarding the possible cause being the Prolia injections that she has been getting. She did see her Dermatologist, who gave her a treatment that actually cleared it up. She has had no problems with the Prolia other wise.  She has had no falls in the last year. She is due for a \"change in therapy\" Dexa Scan after one year on therapy. Current Vit D level is quite good at 60 and Calcium level is 10.  She is taking both in supplementation.     Ruth continues to have significant Alopecia. She " reports very dry skin and eyes, along with extreme fatigue. We tried 25 mcg of Levothyroxine, and unfortunately, she became very symptomatic for hyperthyroidism. Discussed trying a smaller dose or possibly trying [place] Thyroid. Current TSH is within normal range at 1.83 and fT4 is 1.03.  She is having no problems referable to her neck.       Risk Factors     The following high- risk conditions have been ruled out: celiac disease, eating disorders, gastric bypass, hyperparathyroidism, inflammatory bowel disease, hyperthyroidism, rheumatoid arthritis, lupus, chronic kidney disease.     Ruth Pinon has the following risk factors: Age, Female gender, , Low BMI and Family history of osteoporosis     She is not on high risk medications such as glucocorticoids, anti-coagulants, anti-convulsants, chemotherapy or levothyroxine.         Past Medical History     Patient Active Problem List   Diagnosis     Breast CA (H)     Thyroid goiter     H/O hysterectomy for benign disease     Social anxiety disorder     Osteopenia     Hearing loss, bilateral     Emphysema/COPD (H)     Age related osteoporosis     Age-related macular degeneration, dry, both eyes       Family History       family history includes Colon Cancer in her mother.    Social History      reports that she has never smoked. She has never used smokeless tobacco.      Review of Systems     Patient denies current pain, limited mobility, fractures.   Remainder per HPI.    Answers for HPI/ROS submitted by the patient on 5/5/2022  General Symptoms: Yes  Skin Symptoms: Yes  HENT Symptoms: Yes  EYE SYMPTOMS: Yes  HEART SYMPTOMS: No  LUNG SYMPTOMS: No  INTESTINAL SYMPTOMS: No  URINARY SYMPTOMS: Yes  GYNECOLOGIC SYMPTOMS: No  BREAST SYMPTOMS: No  SKELETAL SYMPTOMS: Yes  BLOOD SYMPTOMS: Yes  NERVOUS SYSTEM SYMPTOMS: No  MENTAL HEALTH SYMPTOMS: Yes  Ear pain: No  Ear discharge: No  Hearing loss: Yes  Tinnitus: Yes  Nosebleeds: No  Congestion: Yes  Sinus pain:  No  Trouble swallowing: No   Voice hoarseness: Yes  Mouth sores: No  Sore throat: Yes  Tooth pain: No  Gum tenderness: No  Bleeding gums: No  Change in taste: No  Change in sense of smell: No  Dry mouth: No  Hearing aid used: No  Neck lump: No  Fever: No  Loss of appetite: No  Weight loss: No  Weight gain: No  Fatigue: Yes  Night sweats: No  Chills: Yes  Increased stress: No  Excessive hunger: No  Excessive thirst: No  Feeling hot or cold when others believe the temperature is normal: Yes  Loss of height: Yes  Post-operative complications: No  Surgical site pain: No  Hallucinations: No  Change in or Loss of Energy: Yes  Hyperactivity: No  Confusion: No  Changes in hair: Yes  Changes in moles/birth marks: No  Itching: No  Rashes: No  Changes in nails: No  Acne: No  Hair in places you don't want it: No  Change in facial hair: No  Warts: No  Non-healing sores: No  Scarring: No  Flaking of skin: Yes  Color changes of hands/feet in cold : Yes  Sun sensitivity: Yes  Skin thickening: No  Eye pain: Yes  Vision loss: Yes  Dry eyes: Yes  Watery eyes: Yes  Eye bulging: Yes  Flashing of lights: Yes  Spots: No  Floaters: Yes  Redness: Yes  Crossed eyes: No  Tunnel Vision: No  Yellowing of eyes: Yes  Eye irritation: Yes  Trouble holding urine or incontinence: Yes  Pain or burning: No  Trouble starting or stopping: No  Increased frequency of urination: Yes  Blood in urine: No  Decreased frequency of urination: No  Frequent nighttime urination: Yes  Flank pain: No  Difficulty emptying bladder: No  Back pain: Yes  Muscle aches: No  Neck pain: No  Swollen joints: No  Joint pain: Yes  Bone pain: Yes  Muscle cramps: No  Muscle weakness: No  Joint stiffness: No  Bone fracture: No  Anemia: No  Swollen glands: No  Easy bleeding or bruising: Yes  Edema or swelling: Yes  Nervous or Anxious: Yes  Depression: No  Trouble sleeping: No  Trouble thinking or concentrating: Yes  Mood changes: Yes  Panic attacks: No          Vital Signs     LMP   (LMP Unknown)     Physical Exam     Constitutional:  Well developed, Well nourished  HENT:  Normocephalic,   Neck: normal in appearance  Eyes:  PERRL, Conjunctiva pink  Respiratory:  No respiratory distress  Skin: No acanthosis nigricans, lipoatrophy or lipodystrophy  Neurologic:  Alert & oriented x 3, nonfocal  Psychiatric:  Affect, Mood, Insight appropriate        Assessment     1. Age-related osteoporosis without current pathological fracture    2. Thyroid goiter        Plan     We will plan on continuing the Prolia injections. Will get a Dexa Scan done to see how she is responding to therapy.     Pt is going to ponder starting 12.5 mcg of Levothyroxine, and will research [facility] Thyroid to see if this is something she might be interested in. She will let me know what she thinks.    F/u with me in 1 year.         Cora Bourne NP  [facility]  5/11/2022  6:53 AM      Current Medications     Outpatient Medications Prior to Visit   Medication Sig Dispense Refill     ASCORBATE CALCIUM (VITAMIN C ORAL) [ASCORBATE CALCIUM (VITAMIN C ORAL)] Take by mouth.       b complex vitamins tablet [B COMPLEX VITAMINS TABLET] Take 1 tablet by mouth daily.       biotin 1 mg tablet [BIOTIN 1 MG TABLET] Take 1,000 mcg by mouth 3 (three) times a day.       cholecalciferol, vitamin D3, (VITAMIN D3) 2,000 unit cap [CHOLECALCIFEROL, VITAMIN D3, (VITAMIN D3) 2,000 UNIT CAP] Take by mouth.       dutasteride (AVODART) 0.5 MG capsule Take 0.5 mg by mouth daily       MAGNESIUM ORAL [MAGNESIUM ORAL] Take by mouth.       NEW MED PRESSURE VISION AREDS -OTC SUPPLEMENT.  1 capsules twice daily       OMEGA-3S/DHA/EPA/FISH OIL (OMEGA 3 ORAL) [OMEGA-3S/DHA/EPA/FISH OIL (OMEGA 3 ORAL)] Take by mouth.       vitamin E 400 unit capsule [VITAMIN E 400 UNIT CAPSULE] Take 400 Units by mouth daily.       LUTEIN ORAL [LUTEIN ORAL] Take by mouth.       niacin 500 MG tablet Take by mouth daily       UNABLE TO FIND [UNABLE TO FIND] Med Name: Brain  supplement       Facility-Administered Medications Prior to Visit   Medication Dose Route Frequency Provider Last Rate Last Admin     denosumab (PROLIA) injection 60 mg  60 mg Subcutaneous Q6 Months Cora Bourne NP   60 mg at 12/07/21 1058         Lab Results     TSH   Date Value Ref Range Status   05/06/2022 1.83 0.30 - 5.00 uIU/mL Final           Imaging Results   Last DEXA scan:  No valid procedures specified.    Video-Visit Details    Type of service:  Video Visit    Video End Time: 1350    Originating Location (pt. Location): Home    Distant Location (provider location):  [facility]     Platform used for Video Visit: Tenzin    Date of last OV: 4/28/21  Reason for Visit: Osteoporosis & Hypothyroidism    Dexa Scan: 2/19/21  Last Prolia Injection: 12/07/21

## 2022-05-20 ENCOUNTER — TELEPHONE (OUTPATIENT)
Dept: ENDOCRINOLOGY | Facility: CLINIC | Age: 77
End: 2022-05-20
Payer: COMMERCIAL

## 2022-05-20 DIAGNOSIS — Z92.29 PERSONAL HISTORY OF OTHER DRUG THERAPY: ICD-10-CM

## 2022-05-20 DIAGNOSIS — M81.0 SENILE OSTEOPOROSIS: Primary | ICD-10-CM

## 2022-05-24 ENCOUNTER — HOSPITAL ENCOUNTER (OUTPATIENT)
Dept: BONE DENSITY | Facility: HOSPITAL | Age: 77
Discharge: HOME | End: 2022-05-24
Attending: NURSE PRACTITIONER | Admitting: NURSE PRACTITIONER
Payer: COMMERCIAL

## 2022-05-24 DIAGNOSIS — M81.0 AGE-RELATED OSTEOPOROSIS WITHOUT CURRENT PATHOLOGICAL FRACTURE: ICD-10-CM

## 2022-05-24 PROCEDURE — 77080 DXA BONE DENSITY AXIAL: CPT

## 2022-06-01 ASSESSMENT — ENCOUNTER SYMPTOMS
EYE REDNESS: 1
DOUBLE VISION: 0
SINUS CONGESTION: 1
CHILLS: 0
WEIGHT GAIN: 0
EYE PAIN: 0
FATIGUE: 1
EYE IRRITATION: 1
SORE THROAT: 1
EYE WATERING: 1
FEVER: 0
SINUS PAIN: 0
TROUBLE SWALLOWING: 0
NECK MASS: 0
POLYDIPSIA: 0
TASTE DISTURBANCE: 0
DECREASED APPETITE: 0
HOARSE VOICE: 0
POLYPHAGIA: 0
WEIGHT LOSS: 0
HALLUCINATIONS: 0
INCREASED ENERGY: 1
ALTERED TEMPERATURE REGULATION: 1
NIGHT SWEATS: 0
SMELL DISTURBANCE: 0

## 2022-06-07 ENCOUNTER — ALLIED HEALTH/NURSE VISIT (OUTPATIENT)
Dept: ENDOCRINOLOGY | Facility: CLINIC | Age: 77
End: 2022-06-07
Payer: COMMERCIAL

## 2022-06-07 DIAGNOSIS — Z92.29 PERSONAL HISTORY OF OTHER DRUG THERAPY: ICD-10-CM

## 2022-06-07 DIAGNOSIS — M81.0 SENILE OSTEOPOROSIS: Primary | ICD-10-CM

## 2022-06-07 PROCEDURE — 99207 PR NO CHARGE NURSE ONLY: CPT

## 2022-06-07 PROCEDURE — 96372 THER/PROPH/DIAG INJ SC/IM: CPT | Performed by: NURSE PRACTITIONER

## 2022-06-07 NOTE — PROGRESS NOTES
"Prolia Injection Phone Screen      Screening questions have been asked 2-3 days prior to administration visit for Prolia. If any questions are answered with \"Yes,\" this phone encounter were will routed to ordering provider for further evaluation.     1.  When was the last injection?  12/07/21    2.  Has insurance for this injection been verified?  Yes    3.  Did you experience any new onset achiness or rashes that lasted for over a month with your previous Prolia injection?   No    4.  Do you have a fever over 101?F or a new deep cough that is unusual for you today? No    5.  Have you started any new medications in the last 6 months that you were told could affect your immune system? These may have been prescribed by oncologist, transplant, rheumatology, or dermatology.   No    6.  In the last 6 months have you have gastric bypass or parathyroid surgery?   No    7.  Do you plan dental work requiring drilling into the bone such as implants/extractions or oral surgery in the next 2-3 months?   No    8. Do you have new insurance since the last injection?    Patient informed if symptoms discussed above present prior to their administration appointment, they are to notify clinic immediately.     Radha MILLER RN          The following steps were completed to comply with the REMS program for Prolia:  1. Ordering provider has previously reviewed information in the Medication Guide and Patient Counseling Chart, including the serious risks of Prolia  and the symptoms of each risk and have been advised to seek prompt medical attention if they have signs or symptoms of any of the serious risks.  2. Provided each patient a copy of the Medication Guide and Patient Brochure.  See MAR for administration details.   Indication: Prolia  (denosumab) is a prescription medicine used to treat osteoporosis in patients who:   Are at high risk for fracture, meaning patients who have had a fracture related to osteoporosis, or who have " multiple risk factors for fracture; Cannot use another osteoporosis medicine or other osteoporosis medicines did not work well.   The timeline for early/late injections would be 4 weeks early and any time after the 6 month alayna. If a patient receives their injection late, then the subsequent injection would be 6 months from the date that they actually received the injection    Have the screening questions been asked prior to this administration? Yes    The following medication was given:     MEDICATION: Denosumab (Prolia) 60 mg/ml SOLN  ROUTE: SQ  SITE: Arm - Right  DOSE: 60mg  LOT #: [id number]  :  [employer]  EXPIRATION DATE:  07/24

## 2022-06-29 ENCOUNTER — LAB (OUTPATIENT)
Dept: LAB | Facility: CLINIC | Age: 77
End: 2022-06-29
Payer: COMMERCIAL

## 2022-06-29 DIAGNOSIS — M81.0 AGE-RELATED OSTEOPOROSIS WITHOUT CURRENT PATHOLOGICAL FRACTURE: ICD-10-CM

## 2022-06-29 DIAGNOSIS — E04.9 THYROID GOITER: ICD-10-CM

## 2022-06-29 LAB
CALCIUM SERPL-MCNC: 9.2 MG/DL (ref 8.8–10.2)
CREAT SERPL-MCNC: 0.92 MG/DL (ref 0.51–0.95)
GFR SERPL CREATININE-BSD FRML MDRD: 65 ML/MIN/1.73M2
T4 FREE SERPL-MCNC: 1.31 NG/DL (ref 0.9–1.7)
TSH SERPL DL<=0.005 MIU/L-ACNC: 1.64 UIU/ML (ref 0.3–4.2)

## 2022-06-29 PROCEDURE — 84443 ASSAY THYROID STIM HORMONE: CPT

## 2022-06-29 PROCEDURE — 82310 ASSAY OF CALCIUM: CPT

## 2022-06-29 PROCEDURE — 82565 ASSAY OF CREATININE: CPT

## 2022-06-29 PROCEDURE — 84439 ASSAY OF FREE THYROXINE: CPT

## 2022-06-29 PROCEDURE — 82306 VITAMIN D 25 HYDROXY: CPT

## 2022-06-29 PROCEDURE — 36415 COLL VENOUS BLD VENIPUNCTURE: CPT

## 2022-06-30 LAB — DEPRECATED CALCIDIOL+CALCIFEROL SERPL-MC: 61 UG/L (ref 20–75)

## 2022-07-07 NOTE — PROGRESS NOTES
Assessment/ Plan     1. Blood in urine  It is really unclear from history and exam if the blood is coming from her bladder or vaginal area.  She has had a total hysterectomy so no longer has a cervix, uterus, ovaries.  No source of bleeding was found on pelvic exam.  I did send a Pap of the vaginal cuff.  We will have her stop using intravaginal estrogen for the time being.  As there may be a chance this is coming from the bladder, will refer to urology and let them decide if they want to do cystoscopy.  Further plan pending consult.  - Urinalysis-UC if Indicated  - Culture, Urine  - Ambulatory referral to Urology    2. Postmenopausal bleeding  - Gynecologic Cytology (PAP Smear)    3. Hearing decreased  - Ambulatory referral to Audiology      Subjective:       Ruth Pinon is a 72 y.o. female who presents for possible vaginal bleeding.  Patient states that she typically wears a pad due to some urinary incontinence.  Which he has noticed for the last week or so is one dot of bright red blood on the pad every day.  She thought maybe it was coming from the vagina.  She uses Estrace cream about once per week and sometimes will notice maybe a little bit darker blood on the applicator, but not always.  She states that she has had several bladder surgeries for incontinence in the past.  She has no prior history of kidney stones.  She is not having any dysuria, urgency, or frequency.  She had a total hysterectomy including cervix, uterus, and ovaries.  She recently stopped taking her aromatase inhibitor for breast cancer.  She did tamoxifen for 5 years and at least 10 years of the other.  She stopped due to side effects.  She had stage I breast cancer.  She also wanted another referral to audiology as she just did not get around to getting her hearing checked yet.    Relevant past medical, family, surgical, and social history reviewed with patient, unless noted in HPI, not pertinent for this visit.    Review of Systems  "  A 12 point comprehensive review of systems was negative except as noted.      Current Outpatient Prescriptions   Medication Sig Dispense Refill     alendronate (FOSAMAX) 70 MG tablet Take 1 tablet (70 mg total) by mouth every 7 days. Take in the morning on an empty stomach with a full glass of water 30 minutes before food 12 tablet 3     ASCORBATE CALCIUM (VITAMIN C ORAL) Take by mouth.       b complex vitamins tablet Take 1 tablet by mouth daily.       biotin 1 mg tablet Take 1,000 mcg by mouth 3 (three) times a day.       cholecalciferol, vitamin D3, (VITAMIN D3) 2,000 unit cap Take by mouth.       COPPER ORAL Take by mouth.       estradiol (ESTRACE) 0.01 % (0.1 mg/gram) vaginal cream 1 gm q hs three times weekly 42.5 g 12     LUTEIN ORAL Take by mouth.       MAGNESIUM ORAL Take by mouth.       MILK THISTLE ORAL Take by mouth. rarely       nitrofurantoin, macrocrystal-monohydrate, (MACROBID) 100 MG capsule Take 1 tablet post-intercourse. (Patient taking differently: Take 1 tablet post-intercourse. Rarely) 30 capsule 6     OMEGA-3S/DHA/EPA/FISH OIL (OMEGA 3 ORAL) Take by mouth.       s-adenosylmethionine (JARED-E, ENTERIC COATED,) 400 mg TbEC Take by mouth.       vitamin E 400 unit capsule Take 400 Units by mouth daily.       XANTHAN GUM ORAL Take by mouth.       No current facility-administered medications for this visit.        Objective:      BP 98/50  Pulse 68  Temp 98.3  F (36.8  C) (Oral)   Resp 12  Ht 5' 7\" (1.702 m)  Wt 145 lb (65.8 kg)  BMI 22.71 kg/m2      General appearance: alert, appears stated age and cooperative  Exam: Speculum exam of the vagina is obtained today with no obvious source of bleeding.  Pap smear was taken of the vaginal cuff.    Recent Results (from the past 168 hour(s))   Urinalysis-UC if Indicated   Result Value Ref Range    Color, UA Yellow Colorless, Yellow, Straw, Light Yellow    Clarity, UA Clear Clear    Glucose, UA Negative Negative    Bilirubin, UA Negative Negative    " Ketones, UA Negative Negative    Specific Gravity, UA 1.015 1.005 - 1.030    Blood, UA Negative Negative    pH, UA 7.0 5.0 - 8.0    Protein, UA Trace (!) Negative mg/dL    Urobilinogen, UA 0.2 E.U./dL 0.2 E.U./dL, 1.0 E.U./dL    Nitrite, UA Negative Negative    Leukocytes, UA Small (!) Negative          This note has been dictated using voice recognition software. Any grammatical or context distortions are unintentional and inherent to the software   n/a

## 2022-07-12 ENCOUNTER — TRANSFERRED RECORDS (OUTPATIENT)
Dept: HEALTH INFORMATION MANAGEMENT | Facility: CLINIC | Age: 77
End: 2022-07-12

## 2022-08-17 ENCOUNTER — E-VISIT (OUTPATIENT)
Dept: FAMILY MEDICINE | Facility: CLINIC | Age: 77
End: 2022-08-17
Payer: COMMERCIAL

## 2022-08-17 DIAGNOSIS — N39.0 ACUTE UTI (URINARY TRACT INFECTION): Primary | ICD-10-CM

## 2022-08-17 PROCEDURE — 99421 OL DIG E/M SVC 5-10 MIN: CPT | Performed by: FAMILY MEDICINE

## 2022-08-17 RX ORDER — NITROFURANTOIN 25; 75 MG/1; MG/1
100 CAPSULE ORAL 2 TIMES DAILY
Qty: 10 CAPSULE | Refills: 0 | Status: SHIPPED | OUTPATIENT
Start: 2022-08-17 | End: 2022-08-22

## 2022-08-17 NOTE — PATIENT INSTRUCTIONS
Dear Ruth Pinon    After reviewing your responses, I've been able to diagnose you with a urinary tract infection, which is a common infection of the bladder with bacteria.  This is not a sexually transmitted infection, though urinating immediately after intercourse can help prevent infections.  Drinking lots of fluids is also helpful to clear your current infection and prevent the next one.      I have sent a prescription for antibiotics to your pharmacy to treat this infection.    It is important that you take all of your prescribed medication even if your symptoms are improving after a few doses.  Taking all of your medicine helps prevent the symptoms from returning.     If your symptoms worsen, you develop pain in your back or stomach, develop fevers, or are not improving in 5 days, please contact your primary care provider for an appointment or visit any of our convenient Walk-in or Urgent Care Centers to be seen, which can be found on our website here.    Thanks again for choosing us as your health care partner,    Nasra Houser

## 2022-09-16 DIAGNOSIS — E04.9 THYROID GOITER: ICD-10-CM

## 2022-09-16 RX ORDER — LEVOTHYROXINE SODIUM 25 UG/1
25 TABLET ORAL DAILY
Qty: 90 TABLET | Refills: 0 | Status: SHIPPED | OUTPATIENT
Start: 2022-09-16 | End: 2023-01-16

## 2022-09-18 ENCOUNTER — HEALTH MAINTENANCE LETTER (OUTPATIENT)
Age: 77
End: 2022-09-18

## 2022-10-03 ENCOUNTER — ALLIED HEALTH/NURSE VISIT (OUTPATIENT)
Dept: FAMILY MEDICINE | Facility: CLINIC | Age: 77
End: 2022-10-03
Payer: COMMERCIAL

## 2022-10-03 DIAGNOSIS — Z23 NEED FOR PROPHYLACTIC VACCINATION AND INOCULATION AGAINST INFLUENZA: Primary | ICD-10-CM

## 2022-10-03 PROCEDURE — 90662 IIV NO PRSV INCREASED AG IM: CPT

## 2022-10-03 PROCEDURE — G0008 ADMIN INFLUENZA VIRUS VAC: HCPCS

## 2022-10-03 PROCEDURE — 99207 PR NO CHARGE NURSE ONLY: CPT

## 2022-10-19 ENCOUNTER — MYC MEDICAL ADVICE (OUTPATIENT)
Dept: FAMILY MEDICINE | Facility: CLINIC | Age: 77
End: 2022-10-19

## 2022-10-19 DIAGNOSIS — H91.93 BILATERAL HEARING LOSS, UNSPECIFIED HEARING LOSS TYPE: Primary | ICD-10-CM

## 2022-12-06 ASSESSMENT — ENCOUNTER SYMPTOMS
EYE PAIN: 0
MUSCLE WEAKNESS: 0
EYE WATERING: 1
EYE REDNESS: 1
JOINT SWELLING: 0
NECK PAIN: 1
DOUBLE VISION: 0
EYE IRRITATION: 1
STIFFNESS: 0
MYALGIAS: 0
MUSCLE CRAMPS: 0
ARTHRALGIAS: 0
BACK PAIN: 1

## 2022-12-13 ENCOUNTER — ALLIED HEALTH/NURSE VISIT (OUTPATIENT)
Dept: ENDOCRINOLOGY | Facility: CLINIC | Age: 77
End: 2022-12-13
Payer: COMMERCIAL

## 2022-12-13 DIAGNOSIS — M81.0 AGE-RELATED OSTEOPOROSIS WITHOUT CURRENT PATHOLOGICAL FRACTURE: Primary | ICD-10-CM

## 2022-12-13 PROCEDURE — 99207 PR NO CHARGE NURSE ONLY: CPT

## 2022-12-13 PROCEDURE — 96372 THER/PROPH/DIAG INJ SC/IM: CPT | Performed by: NURSE PRACTITIONER

## 2022-12-13 NOTE — PROGRESS NOTES
"Prolia Injection Phone Screen      Screening questions have been asked 2-3 days prior to administration visit for Prolia. If any questions are answered with \"Yes,\" this phone encounter were will routed to ordering provider for further evaluation.     1.  When was the last injection?  6/7/22    2.  Has insurance for this injection been verified?  Yes    3.  Did you experience any new onset achiness or rashes that lasted for over a month with your previous Prolia injection?   No    4.  Do you have a fever over 101?F or a new deep cough that is unusual for you today? No    5.  Have you started any new medications in the last 6 months that you were told could affect your immune system? These may have been prescribed by oncologist, transplant, rheumatology, or dermatology.   No    6.  In the last 6 months have you have gastric bypass or parathyroid surgery?   No    7.  Do you plan dental work requiring drilling into the bone such as implants/extractions or oral surgery in the next 2-3 months?   No    8. Do you have new insurance since the last injection?    Patient informed if symptoms discussed above present prior to their administration appointment, they are to notify clinic immediately.     Janice Alves RN          The following steps were completed to comply with the REMS program for Prolia:  1. Ordering provider has previously reviewed information in the Medication Guide and Patient Counseling Chart, including the serious risks of Prolia  and the symptoms of each risk and have been advised to seek prompt medical attention if they have signs or symptoms of any of the serious risks.  2. Provided each patient a copy of the Medication Guide and Patient Brochure.  See MAR for administration details.   Indication: Prolia  (denosumab) is a prescription medicine used to treat osteoporosis in patients who:   Are at high risk for fracture, meaning patients who have had a fracture related to osteoporosis, or who have " multiple risk factors for fracture; Cannot use another osteoporosis medicine or other osteoporosis medicines did not work well.   The timeline for early/late injections would be 4 weeks early and any time after the 6 month alayna. If a patient receives their injection late, then the subsequent injection would be 6 months from the date that they actually received the injection    Have the screening questions been asked prior to this administration? Yes      The following medication was given:     MEDICATION: Denosumab (Prolia) 60 mg/ml SOLN  ROUTE: SQ  SITE: Arm - Right  DOSE: 60 mg  LOT #: [id number]  :  [employer]  EXPIRATION DATE:  03/31/2024  NDC#: see mar

## 2023-01-13 DIAGNOSIS — E04.9 THYROID GOITER: ICD-10-CM

## 2023-01-16 RX ORDER — LEVOTHYROXINE SODIUM 25 UG/1
TABLET ORAL
Qty: 90 TABLET | Refills: 0 | Status: SHIPPED | OUTPATIENT
Start: 2023-01-16 | End: 2023-06-26

## 2023-02-09 ASSESSMENT — ENCOUNTER SYMPTOMS
HEARTBURN: 0
JOINT SWELLING: 0
FEVER: 0
FREQUENCY: 1
PARESTHESIAS: 1
SHORTNESS OF BREATH: 0
CHILLS: 0
DIARRHEA: 0
NERVOUS/ANXIOUS: 1
ARTHRALGIAS: 0
COUGH: 0
WEAKNESS: 0
HEMATOCHEZIA: 0
HEMATURIA: 0
BREAST MASS: 0
EYE PAIN: 1
DIZZINESS: 0
SORE THROAT: 0
PALPITATIONS: 0
MYALGIAS: 0
DYSURIA: 0
HEADACHES: 0
NAUSEA: 0
ABDOMINAL PAIN: 0
CONSTIPATION: 0

## 2023-02-09 ASSESSMENT — ACTIVITIES OF DAILY LIVING (ADL): CURRENT_FUNCTION: NO ASSISTANCE NEEDED

## 2023-02-16 ENCOUNTER — OFFICE VISIT (OUTPATIENT)
Dept: FAMILY MEDICINE | Facility: CLINIC | Age: 78
End: 2023-02-16
Payer: COMMERCIAL

## 2023-02-16 VITALS
HEIGHT: 67 IN | WEIGHT: 141 LBS | DIASTOLIC BLOOD PRESSURE: 55 MMHG | RESPIRATION RATE: 16 BRPM | SYSTOLIC BLOOD PRESSURE: 135 MMHG | HEART RATE: 55 BPM | OXYGEN SATURATION: 100 % | BODY MASS INDEX: 22.13 KG/M2

## 2023-02-16 DIAGNOSIS — E04.9 THYROID GOITER: ICD-10-CM

## 2023-02-16 DIAGNOSIS — Z13.220 LIPID SCREENING: ICD-10-CM

## 2023-02-16 DIAGNOSIS — Z12.11 SPECIAL SCREENING FOR MALIGNANT NEOPLASMS, COLON: ICD-10-CM

## 2023-02-16 DIAGNOSIS — Z12.31 ENCOUNTER FOR SCREENING MAMMOGRAM FOR BREAST CANCER: ICD-10-CM

## 2023-02-16 DIAGNOSIS — J43.9 PULMONARY EMPHYSEMA, UNSPECIFIED EMPHYSEMA TYPE (H): ICD-10-CM

## 2023-02-16 DIAGNOSIS — M85.80 OSTEOPENIA, UNSPECIFIED LOCATION: ICD-10-CM

## 2023-02-16 DIAGNOSIS — Z00.00 ENCOUNTER FOR MEDICARE ANNUAL WELLNESS EXAM: Primary | ICD-10-CM

## 2023-02-16 DIAGNOSIS — R41.3 MEMORY CHANGE: ICD-10-CM

## 2023-02-16 DIAGNOSIS — C50.919 MALIGNANT NEOPLASM OF FEMALE BREAST, UNSPECIFIED ESTROGEN RECEPTOR STATUS, UNSPECIFIED LATERALITY, UNSPECIFIED SITE OF BREAST (H): ICD-10-CM

## 2023-02-16 LAB
ALBUMIN SERPL BCG-MCNC: 4.2 G/DL (ref 3.5–5.2)
ALP SERPL-CCNC: 44 U/L (ref 35–104)
ALT SERPL W P-5'-P-CCNC: 24 U/L (ref 10–35)
ANION GAP SERPL CALCULATED.3IONS-SCNC: 14 MMOL/L (ref 7–15)
AST SERPL W P-5'-P-CCNC: 29 U/L (ref 10–35)
BILIRUB SERPL-MCNC: 0.7 MG/DL
BUN SERPL-MCNC: 19.7 MG/DL (ref 8–23)
CALCIUM SERPL-MCNC: 9.7 MG/DL (ref 8.8–10.2)
CHLORIDE SERPL-SCNC: 101 MMOL/L (ref 98–107)
CHOLEST SERPL-MCNC: 199 MG/DL
CREAT SERPL-MCNC: 1.02 MG/DL (ref 0.51–0.95)
DEPRECATED HCO3 PLAS-SCNC: 23 MMOL/L (ref 22–29)
ERYTHROCYTE [DISTWIDTH] IN BLOOD BY AUTOMATED COUNT: 12.7 % (ref 10–15)
GFR SERPL CREATININE-BSD FRML MDRD: 57 ML/MIN/1.73M2
GLUCOSE SERPL-MCNC: 92 MG/DL (ref 70–99)
HCT VFR BLD AUTO: 42.1 % (ref 35–47)
HDLC SERPL-MCNC: 65 MG/DL
HGB BLD-MCNC: 13.9 G/DL (ref 11.7–15.7)
LDLC SERPL CALC-MCNC: 112 MG/DL
MCH RBC QN AUTO: 30.5 PG (ref 26.5–33)
MCHC RBC AUTO-ENTMCNC: 33 G/DL (ref 31.5–36.5)
MCV RBC AUTO: 92 FL (ref 78–100)
NONHDLC SERPL-MCNC: 134 MG/DL
PLATELET # BLD AUTO: 162 10E3/UL (ref 150–450)
POTASSIUM SERPL-SCNC: 4.8 MMOL/L (ref 3.4–5.3)
PROT SERPL-MCNC: 7.1 G/DL (ref 6.4–8.3)
RBC # BLD AUTO: 4.56 10E6/UL (ref 3.8–5.2)
SODIUM SERPL-SCNC: 138 MMOL/L (ref 136–145)
TRIGL SERPL-MCNC: 111 MG/DL
TSH SERPL DL<=0.005 MIU/L-ACNC: 1.69 UIU/ML (ref 0.3–4.2)
WBC # BLD AUTO: 7.8 10E3/UL (ref 4–11)

## 2023-02-16 PROCEDURE — 84443 ASSAY THYROID STIM HORMONE: CPT | Performed by: FAMILY MEDICINE

## 2023-02-16 PROCEDURE — 80061 LIPID PANEL: CPT | Performed by: FAMILY MEDICINE

## 2023-02-16 PROCEDURE — 36415 COLL VENOUS BLD VENIPUNCTURE: CPT | Performed by: FAMILY MEDICINE

## 2023-02-16 PROCEDURE — G0438 PPPS, INITIAL VISIT: HCPCS | Performed by: FAMILY MEDICINE

## 2023-02-16 PROCEDURE — 99213 OFFICE O/P EST LOW 20 MIN: CPT | Mod: 25 | Performed by: FAMILY MEDICINE

## 2023-02-16 PROCEDURE — 80053 COMPREHEN METABOLIC PANEL: CPT | Performed by: FAMILY MEDICINE

## 2023-02-16 PROCEDURE — 85027 COMPLETE CBC AUTOMATED: CPT | Performed by: FAMILY MEDICINE

## 2023-02-16 RX ORDER — ZINC GLUCONATE 50 MG
TABLET ORAL
COMMUNITY
Start: 2021-12-26

## 2023-02-16 RX ORDER — UREA 10 %
500 LOTION (ML) TOPICAL
COMMUNITY

## 2023-02-16 RX ORDER — NIACINAMIDE 500 MG
TABLET ORAL
COMMUNITY
Start: 2022-03-01

## 2023-02-16 RX ORDER — KRILL/OM-3/DHA/EPA/PHOSPHO/AST 500MG-86MG
CAPSULE ORAL
COMMUNITY
Start: 2022-02-01 | End: 2024-02-20

## 2023-02-16 ASSESSMENT — ENCOUNTER SYMPTOMS
DIZZINESS: 0
NAUSEA: 0
SORE THROAT: 0
HEMATOCHEZIA: 0
JOINT SWELLING: 0
ABDOMINAL PAIN: 0
FEVER: 0
ARTHRALGIAS: 0
COUGH: 0
HEADACHES: 0
PARESTHESIAS: 1
CONSTIPATION: 0
SHORTNESS OF BREATH: 0
HEMATURIA: 0
DYSURIA: 0
DIARRHEA: 0
FREQUENCY: 1
BREAST MASS: 0
CHILLS: 0
PALPITATIONS: 0
EYE PAIN: 1
WEAKNESS: 0
HEARTBURN: 0
MYALGIAS: 0
NERVOUS/ANXIOUS: 1

## 2023-02-16 ASSESSMENT — ACTIVITIES OF DAILY LIVING (ADL): CURRENT_FUNCTION: NO ASSISTANCE NEEDED

## 2023-02-16 NOTE — PATIENT INSTRUCTIONS
Mammogram scheduling line - [phone]        Patient Education   Personalized Prevention Plan  You are due for the preventive services outlined below.  Your care team is available to assist you in scheduling these services.  If you have already completed any of these items, please share that information with your care team to update in your medical record.  Health Maintenance Due   Topic Date Due    Breathing Capacity Test  Never done    COPD Action Plan  Never done    ANNUAL REVIEW OF HM ORDERS  02/15/2023    Annual Wellness Visit  02/15/2023       Signs of Hearing Loss      Hearing much better with one ear can be a sign of hearing loss.   Hearing loss is a problem shared by many people. In fact, it is one of the most common health problems, particularly as people age. Most people age 65 and older have some hearing loss. By age 80, almost everyone does. Hearing loss often occurs slowly over the years. So you may not realize your hearing has gotten worse.  Have your hearing checked  Call your healthcare provider if you:  Have to strain to hear normal conversation  Have to watch other people s faces very carefully to follow what they re saying  Need to ask people to repeat what they ve said  Often misunderstand what people are saying  Turn the volume of the television or radio up so high that others complain  Feel that people are mumbling when they re talking to you  Find that the effort to hear leaves you feeling tired and irritated  Notice, when using the phone, that you hear better with one ear than the other  [employer] last reviewed this educational content on 1/1/2020 2000-2021 [employer]. All rights reserved. This information is not intended as a substitute for professional medical care. Always follow your healthcare professional's instructions.          Urinary Incontinence, Female (Adult)   Urinary incontinence means loss of bladder control. This problem affects many women, especially as  they get older. If you have incontinence, you may be embarrassed to ask for help. But know that this problem can be treated.   Types of Incontinence  There are different types of incontinence. Two of the main types are described here. You can have more than one type.   Stress incontinence. With this type, urine leaks when pressure (stress) is put on the bladder. This may happen when you cough, sneeze, or laugh. Stress incontinence most often occurs because the pelvic floor muscles that support the bladder and urethra are weak. This can happen after pregnancy and vaginal childbirth or a hysterectomy. It can also be due to excess body weight or hormone changes.  Urge incontinence (also called overactive bladder). With this type, a sudden urge to urinate is felt often. This may happen even though there may not be much urine in the bladder. The need to urinate often during the night is common. Urge incontinence most often occurs because of bladder spasms. This may be due to bladder irritation or infection. Damage to bladder nerves or pelvic muscles, constipation, and certain medicines can also lead to urge incontinence.  Treatment depends on the cause. Further evaluation is needed to find the type you have. This will likely include an exam and certain tests. Based on the results, you and your healthcare provider can then plan treatment. Until a diagnosis is made, the home care tips below can help ease symptoms.   Home care  Do pelvic floor muscle exercises, if they are prescribed. The pelvic floor muscles help support the bladder and urethra. Many women find that their symptoms improve when doing special exercises that strengthen these muscles. To do the exercises, contract the muscles you would use to stop your stream of urine. But do this when you re not urinating. Hold for 10 seconds, then relax. Repeat 10 to 20 times in a row, at least 3 times a day. Your healthcare provider may give you other instructions for how  to do the exercises and how often.  Keep a bladder diary. This helps track how often and how much you urinate over a set period of time. Bring this diary with you to your next visit with the provider. The information can help your provider learn more about your bladder problem.  Lose weight, if advised to by your provider. Extra weight puts pressure on the bladder. Your provider can help you create a weight-loss plan that s right for you. This may include exercising more and making certain diet changes.  Don't have foods and drinks that may irritate the bladder. These can include alcohol and caffeinated drinks.  Quit smoking. Smoking and other tobacco use can lead to a long-term (chronic) cough that strains the pelvic floor muscles. Smoking may also damage the bladder and urethra. Talk with your provider about treatments or methods you can use to quit smoking.  If drinking large amounts of fluid makes you have symptoms, you may be advised to limit your fluid intake. You may also be advised to drink most of your fluids during the day and to limit fluids at night.  If you re worried about urine leakage or accidents, you may wear absorbent pads to catch urine. Change the pads often. This helps reduce discomfort. It may also reduce the risk of skin or bladder infections.    Follow-up care  Follow up with your healthcare provider, or as directed. It may take some to find the right treatment for your problem. But healthy lifestyle changes can be made right away. These include such things as exercising on a regular basis, eating a healthy diet, losing weight (if needed), and quitting smoking. Your treatment plan may include special therapies or medicines. Certain procedures or surgery may also be options. Talk about any questions you have with your provider.   When to seek medical advice  Call the healthcare provider right away if any of these occur:  Fever of 100.4 F (38 C) or higher, or as directed by your  provider  Bladder pain or fullness  Belly swelling  Nausea or vomiting  Back pain  Weakness, dizziness, or fainting  [employer] last reviewed this educational content on 1/1/2020 2000-2021 [employer]. All rights reserved. This information is not intended as a substitute for professional medical care. Always follow your healthcare professional's instructions.

## 2023-02-16 NOTE — PROGRESS NOTES
"SUBJECTIVE:   Ruth is a 77 year old who presents for Preventive Visit.  Patient has been advised of split billing requirements and indicates understanding: Yes  Are you in the first 12 months of your Medicare coverage?  No    Healthy Habits:     In general, how would you rate your overall health?  Good    Frequency of exercise:  4-5 days/week    Duration of exercise:  30-45 minutes    Do you usually eat at least 4 servings of fruit and vegetables a day, include whole grains    & fiber and avoid regularly eating high fat or \"junk\" foods?  Yes    Taking medications regularly:  Yes    Medication side effects:  None    Ability to successfully perform activities of daily living:  No assistance needed    Home Safety:  Lack of grab bars in the bathroom    Hearing Impairment:  Difficulty following a conversation in a noisy restaurant or crowded room, feel that people are mumbling or not speaking clearly, need to ask people to speak up or repeat themselves and difficulty understanding soft or whispered speech    In the past 6 months, have you been bothered by leaking of urine? Yes    In general, how would you rate your overall mental or emotional health?  Good      PHQ-2 Total Score: 2    Additional concerns today:  Yes    Ruth presents for her annual wellness exam.  She has been doing a good job of staying active this winter.  She had an improvement on her most recent bone density with the Prolia.  She has been following with an eye doctor for some ocular migraines that she was having triggered by light sensitivity.  She has had some concerns about memory.  This tends to be related to forgetting to take things with her when she goes somewhere.  She is not having any cognitive deficits and she did fine on her screening test today.    Have you ever done Advance Care Planning? (For example, a Health Directive, POLST, or a discussion with a medical provider or your loved ones about your wishes): Yes, on file.       Fall " risk  Fallen 2 or more times in the past year?: No  Any fall with injury in the past year?: No    Cognitive Screening   1) Repeat 3 items (Leader, Season, Table)    2) Clock draw: NORMAL  3) 3 item recall: Recalls 3 objects  Results: NORMAL clock, 1-2 items recalled: COGNITIVE IMPAIRMENT LESS LIKELY    Mini-CogTM Copyright RHETT Etienne. Licensed by the author for use in [facility]; reprinted with permission (orlin@[facility]). All rights reserved.      Do you have sleep apnea, excessive snoring or daytime drowsiness?: no    Reviewed and updated as needed this visit by clinical staff   Tobacco  Allergies  Meds              Reviewed and updated as needed this visit by Provider                 Social History     Tobacco Use     Smoking status: Never     Smokeless tobacco: Never   Substance Use Topics     Alcohol use: Not on file         Alcohol Use 2/9/2023   Prescreen: >3 drinks/day or >7 drinks/week? No               Current providers sharing in care for this patient include:   Patient Care Team:  Nasra Houser MD as PCP - General  Nasra Houser MD as Assigned PCP  Cora Bourne NP as Nurse Practitioner  Corinne Cowan AuD as Audiologist (Audiology)    The following health maintenance items are reviewed in Epic and correct as of today:  Health Maintenance   Topic Date Due     SPIROMETRY  Never done     COPD ACTION PLAN  Never done     ANNUAL REVIEW OF HM ORDERS  02/15/2023     COLORECTAL CANCER SCREENING  10/22/2023     MAMMO SCREENING  01/07/2024     MEDICARE ANNUAL WELLNESS VISIT  02/16/2024     FALL RISK ASSESSMENT  02/16/2024     LIPID  02/15/2027     ADVANCE CARE PLANNING  02/15/2027     DTAP/TDAP/TD IMMUNIZATION (3 - Td or Tdap) 09/18/2030     DEXA  05/24/2037     PHQ-2 (once per calendar year)  Completed     INFLUENZA VACCINE  Completed     Pneumococcal Vaccine: 65+ Years  Completed     ZOSTER IMMUNIZATION  Completed     COVID-19 Vaccine  Completed     IPV IMMUNIZATION  Aged Out     MENINGITIS  "IMMUNIZATION  Aged Out     HEPATITIS C SCREENING  Discontinued     Lab work is in process      Mammogram Screening - Patient over age 75, has elected to continue with screening.  Pertinent mammograms are reviewed under the imaging tab.    Review of Systems   Constitutional: Negative for chills and fever.   HENT: Positive for hearing loss. Negative for congestion, ear pain and sore throat.    Eyes: Positive for pain and visual disturbance.   Respiratory: Negative for cough and shortness of breath.    Cardiovascular: Positive for peripheral edema. Negative for chest pain and palpitations.   Gastrointestinal: Negative for abdominal pain, constipation, diarrhea, heartburn, hematochezia and nausea.   Breasts:  Positive for tenderness. Negative for breast mass and discharge.   Genitourinary: Positive for frequency, urgency and vaginal discharge. Negative for dysuria, genital sores, hematuria, pelvic pain and vaginal bleeding.   Musculoskeletal: Negative for arthralgias, joint swelling and myalgias.   Skin: Negative for rash.   Neurological: Positive for paresthesias. Negative for dizziness, weakness and headaches.   Psychiatric/Behavioral: Negative for mood changes. The patient is nervous/anxious.          OBJECTIVE:   /55   Pulse 55   Resp 16   Ht 1.695 m (5' 6.75\")   Wt 64 kg (141 lb)   LMP  (LMP Unknown)   SpO2 100%   BMI 22.25 kg/m   Estimated body mass index is 22.25 kg/m  as calculated from the following:    Height as of this encounter: 1.695 m (5' 6.75\").    Weight as of this encounter: 64 kg (141 lb).  Physical Exam  GENERAL APPEARANCE: healthy, alert and no distress  EYES: Eyes grossly normal to inspection, PERRL and conjunctivae and sclerae normal  HENT: ear canals and TM's normal, nose and mouth without ulcers or lesions, oropharynx clear and oral mucous membranes moist  NECK: no adenopathy, no asymmetry, masses, or scars and thyroid normal to palpation  RESP: lungs clear to auscultation - no " rales, rhonchi or wheezes  BREAST: normal without masses, tenderness or nipple discharge and no palpable axillary masses or adenopathy, surgical scar right breast  CV: regular rate and rhythm, normal S1 S2, no S3 or S4, no murmur, click or rub, no peripheral edema and peripheral pulses strong  ABDOMEN: soft, nontender, no hepatosplenomegaly, no masses and bowel sounds normal  MS: no musculoskeletal defects are noted and gait is age appropriate without ataxia  SKIN: no suspicious lesions or rashes  NEURO: Normal strength and tone, sensory exam grossly normal, mentation intact and speech normal  PSYCH: mentation appears normal and affect normal/bright    Diagnostic Test Results:  Labs reviewed in Epic  No results found for this or any previous visit (from the past 24 hour(s)).    ASSESSMENT / PLAN:   1. Encounter for Medicare annual wellness exam  Ruth presents for her annual wellness exam.  As far as healthcare maintenance, she is due for both her mammogram and her Cologuard.  She has a history of breast cancer so is still getting mammograms yearly with a 3D.  She is been quite active and getting regular exercise.  - PRIMARY CARE FOLLOW-UP SCHEDULING; Future    2. Osteopenia, unspecified location  She has had some improvement in her bone density with the Prolia.  She continues to follow with endocrine.    3. Pulmonary emphysema, unspecified emphysema type (H)  She is completely asymptomatic, this was picked up at [facility].  She has not smoked in many years but does have a 40-year pack history total.    4. Malignant neoplasm of female breast, unspecified estrogen receptor status, unspecified laterality, unspecified site of breast (H)  She has had a previous lumpectomy.  She is due for mammogram.    5. Thyroid goiter  She has postsurgical hypothyroidism.  She is due for her TSH today.  She is on a very low-dose of Synthroid.  - TSH with free T4 reflex; Future  - TSH with free T4 reflex    6. Lipid screening  -  Lipid panel reflex to direct LDL Fasting; Future  - Lipid panel reflex to direct LDL Fasting    7. Memory change  She has had some issues with more I think with attention then with memory.  She was diagnosed with ADHD many years ago.  She will monitor for now and try to really pay attention to the areas that she is struggling.  If she is having more concerns, we could certainly refer her to neurology for work-up and neuropsych testing.  - CBC with platelets; Future  - Comprehensive metabolic panel; Future  - CBC with platelets  - Comprehensive metabolic panel    8. Encounter for screening mammogram for breast cancer  - MA Screen Bilateral w/Bernardo; Future    9. Special screening for malignant neoplasms, colon  - COLOGUARD([employer]); Future      Patient has been advised of split billing requirements and indicates understanding: Yes      COUNSELING:  Reviewed preventive health counseling, as reflected in patient instructions       Regular exercise       Healthy diet/nutrition       Osteoporosis prevention/bone health        She reports that she has never smoked. She has never used smokeless tobacco.      Appropriate preventive services were discussed with this patient, including applicable screening as appropriate for cardiovascular disease, diabetes, osteopenia/osteoporosis, and glaucoma.  As appropriate for age/gender, discussed screening for colorectal cancer, prostate cancer, breast cancer, and cervical cancer. Checklist reviewing preventive services available has been given to the patient.    Reviewed patients plan of care and provided an AVS. The Basic Care Plan (routine screening as documented in Health Maintenance) for Ruth meets the Care Plan requirement. This Care Plan has been established and reviewed with the Patient.      Nasra Houser MD  [facility]    Identified Health Risks:

## 2023-02-17 ENCOUNTER — LAB (OUTPATIENT)
Dept: FAMILY MEDICINE | Facility: CLINIC | Age: 78
End: 2023-02-17
Payer: COMMERCIAL

## 2023-02-17 DIAGNOSIS — Z12.11 SPECIAL SCREENING FOR MALIGNANT NEOPLASMS, COLON: ICD-10-CM

## 2023-02-28 ENCOUNTER — ANCILLARY PROCEDURE (OUTPATIENT)
Dept: MAMMOGRAPHY | Facility: CLINIC | Age: 78
End: 2023-02-28
Attending: FAMILY MEDICINE
Payer: COMMERCIAL

## 2023-02-28 ENCOUNTER — OFFICE VISIT (OUTPATIENT)
Dept: AUDIOLOGY | Facility: CLINIC | Age: 78
End: 2023-02-28
Attending: FAMILY MEDICINE
Payer: COMMERCIAL

## 2023-02-28 DIAGNOSIS — Z12.31 ENCOUNTER FOR SCREENING MAMMOGRAM FOR BREAST CANCER: ICD-10-CM

## 2023-02-28 DIAGNOSIS — H90.3 SENSORINEURAL HEARING LOSS, BILATERAL: Primary | ICD-10-CM

## 2023-02-28 DIAGNOSIS — H91.93 BILATERAL HEARING LOSS, UNSPECIFIED HEARING LOSS TYPE: ICD-10-CM

## 2023-02-28 PROCEDURE — 77067 SCR MAMMO BI INCL CAD: CPT

## 2023-02-28 PROCEDURE — 92550 TYMPANOMETRY & REFLEX THRESH: CPT | Performed by: AUDIOLOGIST

## 2023-02-28 PROCEDURE — 92557 COMPREHENSIVE HEARING TEST: CPT | Performed by: AUDIOLOGIST

## 2023-02-28 NOTE — PROGRESS NOTES
AUDIOLOGY REPORT    SUBJECTIVE:  Ruth Pinon is a 77 year old female who was seen in the Audiology Clinic at the [facility] for audiologic evaluation, referred by Nasra Houser M.D. The patient has been seen previously in this clinic on 2/15/18 for assessment and results indicated borderline-normal sloping to mild sensorineural hearing loss in both ears. The patient reports a history of gradual hearing loss in both ears for the past 4 years. She states that her hearing seems slightly worse in the right ear. Ruth reports that she purchased amplification devices online which were helpful to her until they stopped working. She brings in a pair of [employer] M70-R hearing aids today which belonged to her sister-in-law who passed away. Ruth reports that she experiences intermittent lightheadedness due to low blood pressure. She states that her maternal grandparents were deaf due to scarlet fever and she has several family members on her mother's side with age-related hearing loss. Ruth reports a history of noise exposure due to listening to loud music. She denies a history of otalgia, otorrhea, tinnitus, aural fullness, and ear surgery.     OBJECTIVE:  Abuse Screening:  Do you feel unsafe at home or work/school? No  Do you feel threatened by someone? No  Does anyone try to keep you from having contact with others, or doing things outside of your home? No  Physical signs of abuse present? No     Fall Risk Screen:  1. Have you fallen two or more times in the past year? No  2. Have you fallen and had an injury in the past year? No    Otoscopic exam indicates non-occluding cerumen in the right ear and nearly occluding cerumen in the left ear. Cerumen is removed from both ears using a curette without incident prior to the hearing test.    Pure Tone Thresholds assessed using conventional audiometry with good  reliability from 250-8000 Hz bilaterally using insert earphones and circumaural  headphones     RIGHT:  mild sloping to moderate sensorineural hearing loss    LEFT:    mild sloping to moderate sensorineural hearing loss    Tympanogram:    RIGHT: normal eardrum mobility    LEFT:   normal eardrum mobility    Reflexes (reported by stimulus ear):  RIGHT: Ipsilateral is present at normal levels  RIGHT: Contralateral could not test due to unable to maintain seal.  LEFT:   Ipsilateral is present at normal levels  LEFT:   Contralateral is present at normal levels      Speech Reception Threshold:    RIGHT: 35 dB HL    LEFT:   30 dB HL  Word Recognition Score:     RIGHT: 100% at 75 dB HL using NU-6 recorded word list.    LEFT:   100% at 70 dB HL using NU-6 recorded word list.    Hearing Aids: Ruth brings in a pair of [employer] M70-R hearing aids which belonged to her sister-in-law who passed away. The hearing aid serial numbers are [id number] and [id number]. The hearing aid  warranty ends 3/5/2023. Ruth is provided the pricing information for the hearing aid fitting and dispensing fees which would be charged if she decides to return to this clinic to have the hearing aids programmed for her hearing and to be shown how to use them.       ASSESSMENT:   Testing today reveals mild sloping to moderate sensorineural hearing loss in both ears. Compared to patient's previous audiogram dated 2/15/18, hearing has declined by 5-15 dB at most frequencies in both ears.    PLAN:  Patient was counseled regarding hearing loss and impact on communication. Patient is a good candidate for amplification at this time. She may contact her insurance to find out if she has any insurance coverage for hearing aids. She may return to have her family member's hearing aids fit to her ears if she decides she would like to use those.  It is recommended that the patient return for a hearing test in 1-2 years or sooner if concerns arise.  Please call this clinic with questions regarding these results or  recommendations.      Diane Hughes, [facility]A  [place] Licensed Audiologist #[id number]

## 2023-05-08 ENCOUNTER — LAB (OUTPATIENT)
Dept: LAB | Facility: CLINIC | Age: 78
End: 2023-05-08
Payer: COMMERCIAL

## 2023-05-08 DIAGNOSIS — M81.0 AGE-RELATED OSTEOPOROSIS WITHOUT CURRENT PATHOLOGICAL FRACTURE: ICD-10-CM

## 2023-05-08 LAB
CALCIUM SERPL-MCNC: 9.8 MG/DL (ref 8.8–10.2)
CREAT SERPL-MCNC: 1.08 MG/DL (ref 0.51–0.95)
GFR SERPL CREATININE-BSD FRML MDRD: 53 ML/MIN/1.73M2

## 2023-05-08 PROCEDURE — 82310 ASSAY OF CALCIUM: CPT

## 2023-05-08 PROCEDURE — 82306 VITAMIN D 25 HYDROXY: CPT

## 2023-05-08 PROCEDURE — 82565 ASSAY OF CREATININE: CPT

## 2023-05-08 PROCEDURE — 36415 COLL VENOUS BLD VENIPUNCTURE: CPT

## 2023-05-09 LAB — DEPRECATED CALCIDIOL+CALCIFEROL SERPL-MC: 105 UG/L (ref 20–75)

## 2023-05-19 DIAGNOSIS — Z92.29 PERSONAL HISTORY OF OTHER DRUG THERAPY: ICD-10-CM

## 2023-05-19 DIAGNOSIS — M81.0 AGE-RELATED OSTEOPOROSIS WITHOUT CURRENT PATHOLOGICAL FRACTURE: Primary | ICD-10-CM

## 2023-05-30 ASSESSMENT — ENCOUNTER SYMPTOMS
LIGHT-HEADEDNESS: 0
SKIN CHANGES: 1
POLYPHAGIA: 0
ALTERED TEMPERATURE REGULATION: 1
HYPERTENSION: 0
NIGHT SWEATS: 0
WEIGHT GAIN: 0
FEVER: 0
HYPOTENSION: 0
LEG PAIN: 0
EYE WATERING: 1
EYE PAIN: 0
EYE REDNESS: 1
EXERCISE INTOLERANCE: 0
FATIGUE: 1
INCREASED ENERGY: 1
ORTHOPNEA: 0
DOUBLE VISION: 0
SLEEP DISTURBANCES DUE TO BREATHING: 0
POLYDIPSIA: 0
BREAST MASS: 0
CHILLS: 0
EYE IRRITATION: 1
SYNCOPE: 0
HALLUCINATIONS: 0
POOR WOUND HEALING: 0
DECREASED APPETITE: 0
BREAST PAIN: 1
WEIGHT LOSS: 0
NAIL CHANGES: 0
PALPITATIONS: 0

## 2023-06-06 ENCOUNTER — VIRTUAL VISIT (OUTPATIENT)
Dept: ENDOCRINOLOGY | Facility: CLINIC | Age: 78
End: 2023-06-06
Payer: COMMERCIAL

## 2023-06-06 DIAGNOSIS — N18.31 CHRONIC KIDNEY DISEASE, STAGE 3A (H): ICD-10-CM

## 2023-06-06 DIAGNOSIS — M85.80 OSTEOPENIA, UNSPECIFIED LOCATION: Primary | ICD-10-CM

## 2023-06-06 DIAGNOSIS — Z92.29 PERSONAL HISTORY OF OTHER DRUG THERAPY: ICD-10-CM

## 2023-06-06 DIAGNOSIS — E04.9 THYROID GOITER: ICD-10-CM

## 2023-06-06 PROCEDURE — 99214 OFFICE O/P EST MOD 30 MIN: CPT | Mod: VID | Performed by: NURSE PRACTITIONER

## 2023-06-06 NOTE — LETTER
"    6/6/2023         RE: Ruth Pinon  [address]  [place] [address]        Dear Colleague,    Thank you for referring your patient, Ruth Pinon, to the [facility]. Please see a copy of my visit note below.      [facility]  ENDOCRINOLOGY    Osteoporosis Follow Up 6/7/2023    Ruth Pinon, 1945, [id number]          Reason for visit      1. Osteopenia, unspecified location    2. Personal history of other drug therapy    3. Chronic kidney disease, stage 3a (H)        History     Ruth Pinon is a very pleasant 77 year old old female who presents for follow up.   SUMMARY:  Ruth is contacted today via Video visit to establish care for Osteoporosis and Postablative Hypothyroidism. She has a family hx of Osteo in her mother. She was a smoker, but quit some time ago. She has used Bisphosphonates in the past, but is not on anything currently. She has the unfortunate hx of Breast Cancer with Aromatase inhibitor and Radiation treatment. She has had a ISAIAH about the same time that she would be experiencing Menopause. Her most recent Dexa scan showed that she had lost BMD in both hips, and that her L forearm density is quite low at -3.7.  She is interested in starting Prolia.     TODAY:    Ruth is contacted today via Video Visit in f/u for Osteoporosis and Thyroid goiter. She has had no problems with the Prolia injections..  She has had no falls in the last year. Her \"change of therapy Dexa Scan showed:  COMPARISON: There has been a 8.7% increase in lumbar spine BMD. There has been a 7.2% increase in bilateral hip BMD. Current Vit D level was quite high at 105. Pt was told to stop her supplement for a month and then to restart. She was quite symptomatic of too much Vit D and didn't realize how crummy she was feeling until she stopped taking it and the levels dropped back to normal range. Calcium level is 9.8.  She is taking both in supplementation. She remains active " and walking regularly.      Ruth is doing quite well (finally) on 25 mcg of Levothyroxine. She is feeling much better energy wise and although he continues to have some Alopecia, for which she is working with Dermatology, her overall feeling of wellness has improved.     Creatinine level remains slightly elevated, normal BUN    Risk Factors     The following high- risk conditions have been ruled out: celiac disease, eating disorders, gastric bypass, hyperparathyroidism, inflammatory bowel disease, hyperthyroidism, rheumatoid arthritis, lupus, chronic kidney disease.     Ruth Pinon has the following risk factors: Age, Female gender, , Low BMI and Family history of osteoporosis     She is not on high risk medications such as glucocorticoids, anti-coagulants, anti-convulsants, chemotherapy or levothyroxine.      Past Medical History     Patient Active Problem List   Diagnosis     Breast CA (H)     Thyroid goiter     H/O hysterectomy for benign disease     Social anxiety disorder     Osteopenia     Hearing loss, bilateral     Emphysema/COPD (H)     Age related osteoporosis     Age-related macular degeneration, dry, both eyes     Personal history of other drug therapy     Skin cancer of face     Chronic kidney disease, stage 3a (H)       Family History       family history includes Colon Cancer in her mother.    Social History      reports that she has never smoked. She has never used smokeless tobacco.      Review of Systems     Patient denies current pain, limited mobility, fractures.   Remainder per HPI.      Vital Signs     LMP  (LMP Unknown)     Physical Exam     Constitutional:  Well developed, Well nourished  HENT:  Normocephalic,   Neck: normal in appearance  Eyes:  PERRL, Conjunctiva pink  Respiratory:  No respiratory distress  Skin: No acanthosis nigricans, lipoatrophy or lipodystrophy  Neurologic:  Alert & oriented x 3, nonfocal  Psychiatric:  Affect, Mood, Insight appropriate        Assessment      1. Osteopenia, unspecified location    2. Personal history of other drug therapy    3. Chronic kidney disease, stage 3a (H)        Garland Miranda has had a tremendous response to the Prolia injections, She will continue on them and see me in a year.     She will continue with the Levothyroxine 25 mcg daily.        Cora Bourne NP  [facility]  6/7/2023  6:41 AM      Current Medications     Outpatient Medications Prior to Visit   Medication Sig Dispense Refill     ASCORBATE CALCIUM (VITAMIN C ORAL) [ASCORBATE CALCIUM (VITAMIN C ORAL)] Take by mouth.       b complex vitamins tablet [B COMPLEX VITAMINS TABLET] Take 1 tablet by mouth daily.       cyanocobalamin (VITAMIN B-12) 500 MCG tablet Take 500 mcg by mouth Three times a week       Krill Oil 500 MG CAPS        levothyroxine (SYNTHROID/LEVOTHROID) 25 MCG tablet TAKE ONE TABLET BY MOUTH ONCE DAILY 90 tablet 0     MAGNESIUM ORAL [MAGNESIUM ORAL] Take by mouth.       NEW MED PRESSURE VISION AREDS -OTC SUPPLEMENT.  1 capsules twice daily       niacinamide 500 MG tablet        OMEGA-3S/DHA/EPA/FISH OIL (OMEGA 3 ORAL) [OMEGA-3S/DHA/EPA/FISH OIL (OMEGA 3 ORAL)] Take by mouth.       vitamin E 400 unit capsule [VITAMIN E 400 UNIT CAPSULE] Take 400 Units by mouth daily.       zinc gluconate 50 MG tablet        cholecalciferol, vitamin D3, (VITAMIN D3) 2,000 unit cap [CHOLECALCIFEROL, VITAMIN D3, (VITAMIN D3) 2,000 UNIT CAP] Take by mouth. (Patient not taking: Reported on 6/6/2023)       dutasteride (AVODART) 0.5 MG capsule Take 0.5 mg by mouth daily (Patient not taking: Reported on 6/6/2023)       Facility-Administered Medications Prior to Visit   Medication Dose Route Frequency Provider Last Rate Last Admin     denosumab (PROLIA) injection 60 mg  60 mg Subcutaneous Q6 Months Cora Bourne NP         denosumab (PROLIA) injection 60 mg  60 mg Subcutaneous Q6 Months Cora Bourne NP   60 mg at 12/13/22 1039     denosumab (PROLIA) injection 60 mg  60 mg  Subcutaneous Q6 Months Cora Bourne, NP   60 mg at 12/07/21 1058         Lab Results     TSH   Date Value Ref Range Status   02/16/2023 1.69 0.30 - 4.20 uIU/mL Final   05/06/2022 1.83 0.30 - 5.00 uIU/mL Final           Imaging Results   Last DEXA scan:  No valid procedures specified.    Study Result    Narrative & Impression   EXAM: DX HIP/PELVIS/SPINE  LOCATION: [facility]  DATE/TIME: 5/24/2022 7:53 AM     INDICATION: Change in therapy   started Prolia 1 year ago  COMPARISON: 9/15/2016.  TECHNIQUE: Dual-energy x-ray absorptiometry performed with routine technique.     FINDINGS:     Lumbar Spine: L1-L4: BMD: 1.187 g/cm2. T-score: 0.1. Z-score: 1.8  RIGHT Hip Total: BMD: 0.790 g/cm2. T-score: -1.7. Z-score: 0.0  RIGHT Hip Femoral neck: BMD: 0.744 g/cm2. T-score: -2.1. Z-score: -0.2  LEFT Hip Total: BMD: 0.850 g/cm2. T-score: -1.3. Z-score: 0.5  LEFT Hip Femoral neck: BMD: 0.786 g/cm2. T-score: -1.8. Z-score: 0.1     WHO Criteria:  Normal: T score at or above -1 SD  Osteopenia: T score between -1 and -2.5 SD  Osteoporosis: T score at or below -2.5 SD     COMPARISON: There has been a 8.7% increase in lumbar spine BMD. There has been a 7.2% increase in bilateral hip BMD.     FRAX Results: 10 year probability of major osteoporotic fracture is 24.1%, and of hip fracture is 14.6%, based on the right femoral neck BMD.     RECOMMENDATIONS:   Consider treatment if major osteoporotic fracture score is greater than or equal to 20%. Consider treatment if hip fracture score is greater than or equal to 3%.                                                                      IMPRESSION: Low bone density (OSTEOPENIA). T score meets the World Health Organization (WHO) criteria for low bone density (osteopenia) at one or more measured sites. The risk of osteoporotic fracture increased approximately two-fold for each SD decrease   in T-score.                   Virtual Visit Details    Type of service:  Video  Visit   Video Start Time: 1400  Video End Time:1420    Originating Location (pt. Location): Home    Distant Location (provider location):  On-site  Platform used for Video Visit: [employer]      Answers for HPI/ROS submitted by the patient on 5/30/2023  General Symptoms: Yes  Skin Symptoms: Yes  HENT Symptoms: No  EYE SYMPTOMS: Yes  HEART SYMPTOMS: Yes  LUNG SYMPTOMS: No  INTESTINAL SYMPTOMS: No  URINARY SYMPTOMS: No  GYNECOLOGIC SYMPTOMS: No  BREAST SYMPTOMS: Yes  SKELETAL SYMPTOMS: No  BLOOD SYMPTOMS: No  NERVOUS SYSTEM SYMPTOMS: No  MENTAL HEALTH SYMPTOMS: No  Fever: No  Loss of appetite: No  Weight loss: No  Weight gain: No  Fatigue: Yes  Night sweats: No  Chills: No  Increased stress: No  Excessive hunger: No  Excessive thirst: No  Feeling hot or cold when others believe the temperature is normal: Yes  Loss of height: No  Post-operative complications: No  Surgical site pain: No  Hallucinations: No  Change in or Loss of Energy: Yes  Hyperactivity: No  Confusion: No  Changes in hair: Yes  Changes in moles/birth marks: Yes  Itching: No  Rashes: No  Changes in nails: No  Acne: No  Hair in places you don't want it: No  Change in facial hair: No  Warts: No  Non-healing sores: No  Scarring: No  Flaking of skin: No  Color changes of hands/feet in cold : No  Sun sensitivity: No  Skin thickening: No  Eye pain: No  Vision loss: Yes  Dry eyes: Yes  Watery eyes: Yes  Eye bulging: Yes  Double vision: No  Flashing of lights: Yes  Spots: No  Floaters: Yes  Redness: Yes  Crossed eyes: No  Tunnel Vision: No  Yellowing of eyes: No  Eye irritation: Yes  Chest pain or pressure: No  Fast or irregular heartbeat: No  Pain in legs with walking: No  Trouble breathing while lying down: No  Fingers or toes appear blue: No  High blood pressure: No  Low blood pressure: No  Fainting: No  Murmurs: No  Pacemaker: No  Varicose veins: Yes  Edema or swelling: Yes  Wake up at night with shortness of breath: No  Light-headedness: No  Exercise  intolerance: No  Discharge: No  Lumps: No  Pain: Yes  Nipple retraction: No          Again, thank you for allowing me to participate in the care of your patient.        Sincerely,        Cora Bourne NP

## 2023-06-07 PROBLEM — C44.300 SKIN CANCER OF FACE: Status: ACTIVE | Noted: 2023-06-07

## 2023-06-07 PROBLEM — N18.31 CHRONIC KIDNEY DISEASE, STAGE 3A (H): Status: ACTIVE | Noted: 2023-06-07

## 2023-06-07 NOTE — PROGRESS NOTES
"  [facility]  ENDOCRINOLOGY    Osteoporosis Follow Up 6/7/2023    Ruth Pinon, 1945, [id number]          Reason for visit      1. Osteopenia, unspecified location    2. Personal history of other drug therapy    3. Chronic kidney disease, stage 3a (H)        History     Ruth Pinon is a very pleasant 77 year old old female who presents for follow up.   SUMMARY:  Ruth is contacted today via Video visit to establish care for Osteoporosis and Postablative Hypothyroidism. She has a family hx of Osteo in her mother. She was a smoker, but quit some time ago. She has used Bisphosphonates in the past, but is not on anything currently. She has the unfortunate hx of Breast Cancer with Aromatase inhibitor and Radiation treatment. She has had a ISAIAH about the same time that she would be experiencing Menopause. Her most recent Dexa scan showed that she had lost BMD in both hips, and that her L forearm density is quite low at -3.7.  She is interested in starting Prolia.     TODAY:    Ruth is contacted today via Video Visit in f/u for Osteoporosis and Thyroid goiter. She has had no problems with the Prolia injections..  She has had no falls in the last year. Her \"change of therapy Dexa Scan showed:  COMPARISON: There has been a 8.7% increase in lumbar spine BMD. There has been a 7.2% increase in bilateral hip BMD. Current Vit D level was quite high at 105. Pt was told to stop her supplement for a month and then to restart. She was quite symptomatic of too much Vit D and didn't realize how crummy she was feeling until she stopped taking it and the levels dropped back to normal range. Calcium level is 9.8.  She is taking both in supplementation. She remains active and walking regularly.      Ruth is doing quite well (finally) on 25 mcg of Levothyroxine. She is feeling much better energy wise and although he continues to have some Alopecia, for which she is working with Dermatology, her overall feeling of " wellness has improved.     Creatinine level remains slightly elevated, normal BUN    Risk Factors     The following high- risk conditions have been ruled out: celiac disease, eating disorders, gastric bypass, hyperparathyroidism, inflammatory bowel disease, hyperthyroidism, rheumatoid arthritis, lupus, chronic kidney disease.     Ruth Pinon has the following risk factors: Age, Female gender, , Low BMI and Family history of osteoporosis     She is not on high risk medications such as glucocorticoids, anti-coagulants, anti-convulsants,or chemotherapy.       Past Medical History     Patient Active Problem List   Diagnosis    Breast CA (H)    Thyroid goiter    H/O hysterectomy for benign disease    Social anxiety disorder    Osteopenia    Hearing loss, bilateral    Emphysema/COPD (H)    Age related osteoporosis    Age-related macular degeneration, dry, both eyes    Personal history of other drug therapy    Skin cancer of face    Chronic kidney disease, stage 3a (H)       Family History       family history includes Colon Cancer in her mother.    Social History      reports that she has never smoked. She has never used smokeless tobacco.      Review of Systems     Patient denies current pain, limited mobility, fractures.   Remainder per HPI.    Answers for HPI/ROS submitted by the patient on 5/30/2023  General Symptoms: Yes  Skin Symptoms: Yes  HENT Symptoms: No  EYE SYMPTOMS: Yes  HEART SYMPTOMS: Yes  LUNG SYMPTOMS: No  INTESTINAL SYMPTOMS: No  URINARY SYMPTOMS: No  GYNECOLOGIC SYMPTOMS: No  BREAST SYMPTOMS: Yes  SKELETAL SYMPTOMS: No  BLOOD SYMPTOMS: No  NERVOUS SYSTEM SYMPTOMS: No  MENTAL HEALTH SYMPTOMS: No  Fever: No  Loss of appetite: No  Weight loss: No  Weight gain: No  Fatigue: Yes  Night sweats: No  Chills: No  Increased stress: No  Excessive hunger: No  Excessive thirst: No  Feeling hot or cold when others believe the temperature is normal: Yes  Loss of height: No  Post-operative complications:  No  Surgical site pain: No  Hallucinations: No  Change in or Loss of Energy: Yes  Hyperactivity: No  Confusion: No  Changes in hair: Yes  Changes in moles/birth marks: Yes  Itching: No  Rashes: No  Changes in nails: No  Acne: No  Hair in places you don't want it: No  Change in facial hair: No  Warts: No  Non-healing sores: No  Scarring: No  Flaking of skin: No  Color changes of hands/feet in cold : No  Sun sensitivity: No  Skin thickening: No  Eye pain: No  Vision loss: Yes  Dry eyes: Yes  Watery eyes: Yes  Eye bulging: Yes  Double vision: No  Flashing of lights: Yes  Spots: No  Floaters: Yes  Redness: Yes  Crossed eyes: No  Tunnel Vision: No  Yellowing of eyes: No  Eye irritation: Yes  Chest pain or pressure: No  Fast or irregular heartbeat: No  Pain in legs with walking: No  Trouble breathing while lying down: No  Fingers or toes appear blue: No  High blood pressure: No  Low blood pressure: No  Fainting: No  Murmurs: No  Pacemaker: No  Varicose veins: Yes  Edema or swelling: Yes  Wake up at night with shortness of breath: No  Light-headedness: No  Exercise intolerance: No  Discharge: No  Lumps: No  Pain: Yes  Nipple retraction: No      Vital Signs     LMP  (LMP Unknown)     Physical Exam     Constitutional:  Well developed, Well nourished  HENT:  Normocephalic,   Neck: normal in appearance  Eyes:  PERRL, Conjunctiva pink  Respiratory:  No respiratory distress  Skin: No acanthosis nigricans, lipoatrophy or lipodystrophy  Neurologic:  Alert & oriented x 3, nonfocal  Psychiatric:  Affect, Mood, Insight appropriate        Assessment     1. Osteopenia, unspecified location    2. Personal history of other drug therapy    3. Chronic kidney disease, stage 3a (H)        Garland Miranda has had a tremendous response to the Prolia injections, She will continue on them and see me in a year.     She will continue with the Levothyroxine 25 mcg daily.        Cora Bourne NP   Endocrinology  6/7/2023  6:41 AM      Current  Medications     Outpatient Medications Prior to Visit   Medication Sig Dispense Refill    ASCORBATE CALCIUM (VITAMIN C ORAL) [ASCORBATE CALCIUM (VITAMIN C ORAL)] Take by mouth.      b complex vitamins tablet [B COMPLEX VITAMINS TABLET] Take 1 tablet by mouth daily.      cyanocobalamin (VITAMIN B-12) 500 MCG tablet Take 500 mcg by mouth Three times a week      Krill Oil 500 MG CAPS       levothyroxine (SYNTHROID/LEVOTHROID) 25 MCG tablet TAKE ONE TABLET BY MOUTH ONCE DAILY 90 tablet 0    MAGNESIUM ORAL [MAGNESIUM ORAL] Take by mouth.      NEW MED PRESSURE VISION AREDS -OTC SUPPLEMENT.  1 capsules twice daily      niacinamide 500 MG tablet       OMEGA-3S/DHA/EPA/FISH OIL (OMEGA 3 ORAL) [OMEGA-3S/DHA/EPA/FISH OIL (OMEGA 3 ORAL)] Take by mouth.      vitamin E 400 unit capsule [VITAMIN E 400 UNIT CAPSULE] Take 400 Units by mouth daily.      zinc gluconate 50 MG tablet       cholecalciferol, vitamin D3, (VITAMIN D3) 2,000 unit cap [CHOLECALCIFEROL, VITAMIN D3, (VITAMIN D3) 2,000 UNIT CAP] Take by mouth. (Patient not taking: Reported on 6/6/2023)      dutasteride (AVODART) 0.5 MG capsule Take 0.5 mg by mouth daily (Patient not taking: Reported on 6/6/2023)       Facility-Administered Medications Prior to Visit   Medication Dose Route Frequency Provider Last Rate Last Admin    denosumab (PROLIA) injection 60 mg  60 mg Subcutaneous Q6 Months Cora Bourne NP        denosumab (PROLIA) injection 60 mg  60 mg Subcutaneous Q6 Months Cora Bourne NP   60 mg at 12/13/22 1039    denosumab (PROLIA) injection 60 mg  60 mg Subcutaneous Q6 Months Cora Bourne, NP   60 mg at 12/07/21 1058         Lab Results     TSH   Date Value Ref Range Status   02/16/2023 1.69 0.30 - 4.20 uIU/mL Final   05/06/2022 1.83 0.30 - 5.00 uIU/mL Final           Imaging Results   Last DEXA scan:  No valid procedures specified.    Study Result    Narrative & Impression   EXAM: DX HIP/PELVIS/SPINE  LOCATION: [facility]  DATE/TIME: 5/24/2022 7:53 AM     INDICATION: Change in therapy   started Prolia 1 year ago  COMPARISON: 9/15/2016.  TECHNIQUE: Dual-energy x-ray absorptiometry performed with routine technique.     FINDINGS:     Lumbar Spine: L1-L4: BMD: 1.187 g/cm2. T-score: 0.1. Z-score: 1.8  RIGHT Hip Total: BMD: 0.790 g/cm2. T-score: -1.7. Z-score: 0.0  RIGHT Hip Femoral neck: BMD: 0.744 g/cm2. T-score: -2.1. Z-score: -0.2  LEFT Hip Total: BMD: 0.850 g/cm2. T-score: -1.3. Z-score: 0.5  LEFT Hip Femoral neck: BMD: 0.786 g/cm2. T-score: -1.8. Z-score: 0.1     WHO Criteria:  Normal: T score at or above -1 SD  Osteopenia: T score between -1 and -2.5 SD  Osteoporosis: T score at or below -2.5 SD     COMPARISON: There has been a 8.7% increase in lumbar spine BMD. There has been a 7.2% increase in bilateral hip BMD.     FRAX Results: 10 year probability of major osteoporotic fracture is 24.1%, and of hip fracture is 14.6%, based on the right femoral neck BMD.     RECOMMENDATIONS:   Consider treatment if major osteoporotic fracture score is greater than or equal to 20%. Consider treatment if hip fracture score is greater than or equal to 3%.                                                                      IMPRESSION: Low bone density (OSTEOPENIA). T score meets the World Health Organization (WHO) criteria for low bone density (osteopenia) at one or more measured sites. The risk of osteoporotic fracture increased approximately two-fold for each SD decrease   in T-score.                   Virtual Visit Details    Type of service:  Video Visit   Video Start Time:  1400  Video End Time: 1420    Originating Location (pt. Location): Home    Distant Location (provider location):  On-site  Platform used for Video Visit: [employer]

## 2023-06-22 ASSESSMENT — ENCOUNTER SYMPTOMS
JOINT SWELLING: 0
COUGH: 0
EYE WATERING: 1
EYE REDNESS: 1
NECK PAIN: 1
MUSCLE CRAMPS: 1
STIFFNESS: 0
SNORES LOUDLY: 0
COUGH DISTURBING SLEEP: 0
BACK PAIN: 1
MYALGIAS: 0
MUSCLE WEAKNESS: 0
ARTHRALGIAS: 0
DYSPNEA ON EXERTION: 1
WHEEZING: 0
EYE IRRITATION: 1
EYE PAIN: 1
POSTURAL DYSPNEA: 0
SPUTUM PRODUCTION: 1
DOUBLE VISION: 0
HEMOPTYSIS: 0
SHORTNESS OF BREATH: 0

## 2023-06-26 DIAGNOSIS — E04.9 THYROID GOITER: ICD-10-CM

## 2023-06-26 RX ORDER — LEVOTHYROXINE SODIUM 25 UG/1
TABLET ORAL
Qty: 90 TABLET | Refills: 0 | Status: SHIPPED | OUTPATIENT
Start: 2023-06-26 | End: 2024-01-04

## 2023-06-26 NOTE — TELEPHONE ENCOUNTER
"Requested Prescriptions   Pending Prescriptions Disp Refills     levothyroxine (SYNTHROID/LEVOTHROID) 25 MCG tablet [Pharmacy Med Name: LEVOTHYROXINE SODIUM 25MCG TABS] 90 tablet 0     Sig: TAKE ONE TABLET BY MOUTH ONCE DAILY       Thyroid Protocol Passed - 6/26/2023  7:45 AM        Passed - Patient is 12 years or older        Passed - Recent (12 mo) or future (30 days) visit within the authorizing provider's specialty     Patient has had an office visit with the authorizing provider or a provider within the authorizing providers department within the previous 12 mos or has a future within next 30 days. See \"Patient Info\" tab in inbasket, or \"Choose Columns\" in Meds & Orders section of the refill encounter.              Passed - Medication is active on med list        Passed - Normal TSH on file in past 12 months     Recent Labs   Lab Test 02/16/23  1435   TSH 1.69              Passed - No active pregnancy on record     If patient is pregnant or has had a positive pregnancy test, please check TSH.          Passed - No positive pregnancy test in past 12 months     If patient is pregnant or has had a positive pregnancy test, please check TSH.               "

## 2023-06-27 ENCOUNTER — ALLIED HEALTH/NURSE VISIT (OUTPATIENT)
Dept: RESEARCH | Facility: CLINIC | Age: 78
End: 2023-06-27
Payer: COMMERCIAL

## 2023-06-27 VITALS
DIASTOLIC BLOOD PRESSURE: 71 MMHG | WEIGHT: 133 LBS | SYSTOLIC BLOOD PRESSURE: 139 MMHG | HEIGHT: 67 IN | OXYGEN SATURATION: 100 % | HEART RATE: 55 BPM | BODY MASS INDEX: 20.88 KG/M2 | RESPIRATION RATE: 16 BRPM

## 2023-06-27 DIAGNOSIS — Z00.6 EXAMINATION OF PARTICIPANT OR CONTROL IN CLINICAL RESEARCH: Primary | ICD-10-CM

## 2023-06-27 PROCEDURE — 99207 PR NO CHARGE-RESEARCH SERVICE: CPT

## 2023-06-27 NOTE — PROGRESS NOTES
"   [facility] Study Physical Exam      Medical History Reviewed? Yes  (Parenteral aminoglycoside antibiotics: gentamicin, amikacin, tobramycin, neomycin, and streptomycin, ect )    Does patient use hearing aids daily? No    Physical Examination  For abnormal findings, please evaluate if the finding is Clinically Significant (by 'CS') or Not Clinically Significant (by 'NCS')  General Appearance   Normal  Head and Neck   Normal  Lungs     Normal  Cardiovascular   Normal  Abdomen    Normal  Lymph Nodes   Normal  Skin     Normal  Neurological    Normal      Vitals:    06/27/23 1111   BP: 139/71   Pulse: 55   Resp: 16   SpO2: 100%   Weight: 60.3 kg (133 lb)   Height: 1.702 m (5' 7\")              Otoscopy    Left Ear Right Ear   Ear Canal  Normal Abnormal - NCS   Abnormal Findings Description  N/A Right ear canal with cerumen and removed post right ear irrigation   Finding of Other Description N/A N/A         Cerumen Disimpaction Procedure Note    Provider: Christine Singh NP      Consent:  Informed verbal consent; Writer discussed with the subject that study visits include examination of the ears by a clinician who may remove ear wax if deemed they may experience discomfort with ear exams and/or removal of wax. Removal of ear wax is considered safe but does have potential risks of eardrum perforation, a laceration (cut) in the ear canal, dizziness, ringing in the ears, loss of ability to hear, nausea, vertigo, nystagmus and inability to remove ear wax.     Background:   When did the symptoms begin?: patient asymptomatic for cerumen of the right ear  Describe the duration of the symptoms: N/A   Affected Ear: right side  Location: N/A  Pain Severity: no pain  Additional Symptoms: none  Affect on ADLs: none   History: cerumen impaction  Interventions Taken Prior to Today: Patient has had ear irrigations in the past  Measures which relieve Symptoms: N/A    Provider ear assessment completed prior to ear irrigation. Otoscopic exam " reveals cerumen present and irrigation advised. Patient was screened for ear irrigations negative exclusion critera. Patient had  the procedure and cerumen removed of right ear, and TM intact without erythema, bleeding or swelling of canal. Patient states she did have decreased hearing from it but no pain 0/10 TM is intact as stated before. NP evaluated with Licensed Practitioner (Medical Provider).    27-JUN-2023    Christine Singh NP

## 2023-06-27 NOTE — PROGRESS NOTES
Tracy Study Consent    Study Description: This is a prospective intervention-based study that will involve data collection from individuals with measured or perceived mild-to-moderate hearing loss. This study is deemed to meet the qualification of a research study with non-significant risk.          Ruth Pinon a 77 year old female, was on-site  today to discuss participation in the Tracy Study.       The consent form was reviewed with the patient.     The review of the study included:    Study Purpose      Participant Responsibilities      Study Data and Devices      Benefits and Risks of Participation      Compensation and Costs of Participation      Voluntary Participation      Confidentiality      Injury and Legal Rights      Protocol Version: 2.0     Subject ID: [id number]    The subject was queried in regards to her willingness to continue and her questions were answered to her satisfaction.     The patient has given her agreement to volunteer and participate in the above noted study.     The eConsent and HIPAA form version 3.0 (8-Jun-2023) was signed on  27-JUN-2023 with the Clinical Research Unit of [facility].     A copy of the Tracy consent will be placed in subject's medical record. A copy of the consent form was given to the subject today.    Study data is directly entered into Epic and [employer] per protocol.     No study procedures were done prior to Ruth Pinon providing informed consent.       27-JUN-2023    Sara Behmanesh

## 2023-06-27 NOTE — PROGRESS NOTES
"**Late  note: refreshed epic note to populate vitals that were originally taken on 27JUN2023.   S-B [id number]               [place] Study Note    Study Description: This is a prospective intervention-based study that will involve data collection from individuals with measured or perceived mild-to-moderate hearing loss. This study is deemed to meet the qualification of a research study with non-significant risk.           Subject ID: [id number]      SCREENING     Demographic Info  Ruth Pinon   1945          77 year old  female    Race: White  Ethnicity: Non-/       Medical History:  Has the subject experienced any past and/or concomitant Medical History in the following categories: No  -Ear/Nose/Throat  -Dementia or other neurological condition preventing following instructions  -Vascular conditions    If yes, record that medical history in [facility], other conditions are NOT entered into [employer] and deleted from below    Past Medical History:   Diagnosis Date    Breast cancer (H) 2001    left       Medications  Has the subject taken either of these medications in the last 6 months?   Parenteral Aminoglycoside Antibiotics: No   (ex. gentamicin, amikacin, tobramycin, neomycin, streptomycin and others)  Chemotherapy and/or radiation to Head and/or Neck: No      Vitals:  /71   Pulse 55   Resp 16   Ht 1.702 m (5' 7\")   Wt 60.3 kg (133 lb)   LMP  (LMP Unknown)   SpO2 100%   BMI 20.83 kg/m         Perceived Hearing Loss:  Do you have trouble hearing speech in noisy places? Yes  Do you find it hard to follow speech in groups? Yes  Do you have trouble hearing on the phone? No  Do you need to turn up the volume on the TV or radio, and other people complain it is too loud? Yes    ELIGIBILITY CRITERIA     Protocol Version: 2.0 (24-May-2023)  Consent Version: 3.0 (8-Jun-2023)  Criteria #  Inclusion Criteria (ALL MUST BE YES)  YES/NO/N/A   1  Age > 18 years Yes   2  Proficient in written " and spoken English, defined by self report Yes   3  Mild- to Moderate- hearing loss as measured by pure tone audiometry (PTA) reference test, or self-report of perceived hearing loss and 15-19 dB hearing loss (by 4PTA) NA   4  Participants have access to stable internet connection  Yes           Criteria # Exclusion Criteria (ALL MUST BE NO) YES/NO/N/A   5  Ear anatomy non-conducive to comfortable wear of headphone  NA   6  Active ear disease  No   7  Cerumen impaction that cannot be removed  No   8  Sudden loss of hearing (in the preceding 90 days), defined by self-report No   9  Self-report of loud environmental sound exposure (e.g., concert, construction site, fireworks) without hearing protection, within 72 hours of reference PTA assessed at Clinic Visit 1 NA   10  Tinnitus that impacts one's daily life, defined by self-report No   11  Use of cochlear implants No   12  Self-reported issues with small or confined spaces such as a single-person enclosed cunningham, and/or claustrophobia No   13  Health technology, fitness, media outlet employees (or spouse of employees) or employees of /sites contracted to execute this study  No   14  User noted preference to not wear headphone consistently, or charge headphone and smartphone consistently, during field-use  No   15  Hearing loss >60 dB HL at 0.25-3kHz and >65 dB HL at 4kHz in either ear; assessed during PTA NA   16  Hearing loss that requires electroacoustic settings which are not acoustically stable in the participant's ear per audiologist judgement NA   17  Current regular use of hearing aids No   18  Active treatment, or treatment in the past 6 months, with either a chemotherapeutic drug for cancer, or radiation therapy to the head or neck region No   19  Active treatment, or treatment in the past 6 months, with parenteral aminoglycoside antibiotics  No   20   In the Investigator's opinion, unable to adhere to study procedures No     Patient does fulfill study  inclusion criteria and no exclusion criteria are found at this time. IE criteria assessment will be completed at Clinic Visit 1.    Subject Disposition Summary  Is there a change in Subject Disposition? No    27-JUN-2023  Sara Behmanesh

## 2023-06-28 ENCOUNTER — TELEPHONE (OUTPATIENT)
Dept: RESEARCH | Facility: CLINIC | Age: 78
End: 2023-06-28
Payer: COMMERCIAL

## 2023-06-28 NOTE — TELEPHONE ENCOUNTER
Patient called (patient had right ear irrigation yesterday) stated her right ear still feels clogged and decreased hearing yesterday it was 8/10 and now it's a 5/10.  Patient states she has decreased hearing. Patient states prior to the right ear irrigation performed yesterday, she did not feel like it was clogged nor did she had decreased hearing. Patient denied pain, drainage including no blood or mucus, ringing of the ears, nausea, and vertigo. Patient states when she turns her head to the side, it feels like she has a fluid sensation.     NP will call patient tomorrow at noon for an update.

## 2023-06-29 ENCOUNTER — TELEPHONE (OUTPATIENT)
Dept: RESEARCH | Facility: CLINIC | Age: 78
End: 2023-06-29

## 2023-06-29 ENCOUNTER — ALLIED HEALTH/NURSE VISIT (OUTPATIENT)
Dept: ENDOCRINOLOGY | Facility: CLINIC | Age: 78
End: 2023-06-29
Payer: COMMERCIAL

## 2023-06-29 DIAGNOSIS — Z92.29 PERSONAL HISTORY OF OTHER DRUG THERAPY: ICD-10-CM

## 2023-06-29 DIAGNOSIS — M81.0 SENILE OSTEOPOROSIS: Primary | ICD-10-CM

## 2023-06-29 PROCEDURE — 96372 THER/PROPH/DIAG INJ SC/IM: CPT | Performed by: NURSE PRACTITIONER

## 2023-06-29 PROCEDURE — 99207 PR NO CHARGE NURSE ONLY: CPT

## 2023-06-29 NOTE — PROGRESS NOTES
Clinic Administered Medication Documentation      Prolia Documentation    Indication: Prolia  (denosumab) is a prescription medicine used to treat osteoporosis in patients who:     Are at high risk for fracture, meaning patients who have had a fracture related to osteoporosis, or who have multiple risk factors for fracture.    Cannot use another osteoporosis medicine or other osteoporosis medicines did not work well.    The timeline for early/late injections would be 4 weeks early and any time after the 6 month alayna. If a patient receives their injection late, then the subsequent injection would be 6 months from the date that they actually received the injection.    When was the last injection?  22  Was the last injection at least 6 months ago? Yes  Has the prior authorization been completed?  Yes  Is there an active order (written within the past 365 days, with administrations remaining, not ) in the chart?  Yes  Patient denies any dental work involving the bone (e.g. tooth extraction or dental implants) in the past 4 weeks?  Yes  Patient denies plans for any dental work involving the bone (e.g. tooth extraction or dental implants) in the next 4 weeks? Yes    The following steps were completed to comply with the REMS program for Prolia:    Reviewed information in the Medication Guide and Patient Counseling Chart, including the serious risks of Prolia  and the symptoms of each risk.    Advised patient to seek prompt medical attention if they have signs or symptoms of any of the serious risks.    Provided each patient a copy of the Medication Guide and Patient Brochure.      Prior to injection, verified patient identity using patient's name and date of birth. Medication was administered. Please see MAR and medication order for additional information. Patient instructed to remain in clinic for 15 minutes and report any adverse reaction to staff immediately.    Vial/Syringe: Syringe  Was this medication  supplied by the patient? No  Verified that the patient has refills remaining in their prescription.

## 2023-06-29 NOTE — TELEPHONE ENCOUNTER
Research: Patient had right irrigation on 6/27/2023, afterwards patient states she decrease hearing and felt like it was clogged 8/10. Patient called yesterday stating she continued to have decrease hearing and felt like it was clogged 5/10. Patient stated during this telephone conversation she did not have pain, drainage, including no blood nor mucus, ringing of the ears nausea, and vertigo. Patient states today, that yesterday she laid on the floor and carefully and gently tap the back of her head, the water sensation was released, she no longer had decreased hearing nor a clogged sensation.  Patient states her hearing is back to baseline.

## 2023-07-03 ENCOUNTER — ALLIED HEALTH/NURSE VISIT (OUTPATIENT)
Dept: RESEARCH | Facility: CLINIC | Age: 78
End: 2023-07-03
Payer: COMMERCIAL

## 2023-07-03 ENCOUNTER — OFFICE VISIT (OUTPATIENT)
Dept: AUDIOLOGY | Facility: CLINIC | Age: 78
End: 2023-07-03

## 2023-07-03 DIAGNOSIS — Z00.6 EXAMINATION OF PARTICIPANT OR CONTROL IN CLINICAL RESEARCH: Primary | ICD-10-CM

## 2023-07-03 DIAGNOSIS — Z00.6 RESEARCH STUDY PATIENT: Primary | ICD-10-CM

## 2023-07-03 PROCEDURE — 99207 PR NO CHARGE NURSE ONLY: CPT

## 2023-07-03 PROCEDURE — 92567 TYMPANOMETRY: CPT | Performed by: AUDIOLOGIST

## 2023-07-03 PROCEDURE — V5011 HEARING AID FITTING/CHECKING: HCPCS | Performed by: AUDIOLOGIST

## 2023-07-03 PROCEDURE — V5020 CONFORMITY EVALUATION: HCPCS | Performed by: AUDIOLOGIST

## 2023-07-03 PROCEDURE — 92557 COMPREHENSIVE HEARING TEST: CPT | Performed by: AUDIOLOGIST

## 2023-07-03 NOTE — PROGRESS NOTES
[place] Inclusion/Exclusion Criteria:    Study Name: Tracy   : Jose Govea MD      Study Description: This is a prospective intervention-based study that will involve data collection from individuals with measured or perceived mild-to-moderate hearing loss. This study is deemed to meet the qualification of a research study with non-significant risk.     Protocol Version: 2.0 (24-May-2023)  Consent Version: 3.0 (8-Jun-2023)    Criteria #  Inclusion Criteria (ALL MUST BE YES)  YES/NO/N/A   1  Age > 18 years  Yes   2  Proficient in written and spoken English, defined by self report Yes   3  Mild- to Moderate- hearing loss as measured by pure tone audiometry (PTA) reference test, or self-report of perceived hearing loss and 15-25 dB hearing loss (by 4PTA) Yes   4  Participants have access to stable internet connection    Yes             Criteria # Exclusion Criteria (ALL MUST BE NO) YES/NO/N/A   5  Ear anatomy non-conducive to comfortable wear of headphone  No   6  Active ear disease    No   7  Cerumen impaction that cannot be removed    No   8  Sudden loss of hearing (in the preceding 90 days), defined by self-report No   9 Self-report of loud environmental sound exposure (e.g., concert, construction site, fireworks) without hearing protection, within 72 hours of reference PTA assessed at Clinic Visit 1 No   10    Tinnitus that impacts one's daily life, defined by self-report No   11  Use of cochlear implants   No   12  Self-reported issues with small or confined spaces such as single-person enclosed cunningham, and/or claustrophobia No   13  Health technology, fitness, media outlet employees (or spouse of employees) or employees of /sites contracted to execute this study  No   14  User noted preference to not wear headphone consistently, or charge headphone and smartphone consistently, during field-use  No   15  Hearing loss >60 dB HL at 0.25-3kHz and >65 dB HL at 4kHz in either ear; assessed  during PTA No   16  Hearing loss that requires electroacoustic settings which are not acoustically stable in the participant's ear per audiologist judgement No   17    Current regular use of hearing aids No   18    Active treatment, or treatment in the past 6 months, with either a chemotherapeutic drug for cancer, or radiation therapy to the head or neck region No   19    Active treatment, or treatment in the past 6 months, with parenteral aminoglycoside antibiotics  No   20    In the Investigator's opinion, unable to adhere to study procedures No     Patient does fulfill study inclusion criteria and no exclusion criteria are found at this time. IE criteria assessment will be completed at Clinic Visit 1.        Jose Govea MD    03-JUL-2023    Sara Behmanesh     mildly slurred speech, smells of marijuana

## 2023-07-03 NOTE — PROGRESS NOTES
Tracy Study Clinic Visit 1 Note    Study Description: This is a prospective intervention-based study that will involve data collection from individuals with measured or perceived mild-to-moderate hearing loss. This study is deemed to meet the qualification of a research study with non-significant risk.           Ruth Pinon a 77 year old female, was seen at the [facility] today to discuss participation in the Tracy study and complete Clinic Visit 1.    Subject ID: [id number]    Study data including Pure Tone Reference, Tympanometry, Bone Conduction was captured on the audiogram and paper source and then entered into [facility] later by Sara Behmanesh.     Randomization:   Group Confirmed? [x]  Treatment Arm: Pro-Fit    Device Accountability Dispense  Was Device Kit Dispensed?  Yes  Date Device Kit was dispensed? 03-JUL-2023  Device Kit ID: [id number]    Phone ID: [id number]    Charging Case ID: [id number]   Case Serial Number: [id number]    Recommended Headphone Tip Size: L  Left Ear Final Tip Size: L   Right Ear Final Tip Size: L    Patient was extensively educated for their treatment arm. All questions were answered and subject voiced understanding.     Is there a change in Subject Disposition?  No  Were any Adverse Events (AEs) experienced?  No  Were there any Protocol Deviations? No    03-JUL-2023    Sara Behmanesh

## 2023-07-03 NOTE — PROGRESS NOTES
Tracy Study Clinic Visit 1 Note    Study Description: This is a prospective intervention-based study that will involve data collection from individuals with measured or perceived mild-to-moderate hearing loss. This study is deemed to meet the qualification of a research study with non-significant risk.           Ruth Pinon a 77 year old female, was seen at the [facility] today to discuss participation in the Tracy study.     Pure Tone Reference, Tympanometry and Bone Conduction were performed during this visit.     Subject Status: Enrolled and Randomized    Study data was captured on the audiogram and then entered into EDC later by a Research Coordinator.     No study procedures were done prior to Ruth Pinon providing informed consent.     All subject questions were answered and they voiced understanding.     Bessie Bryson, AuD  07/03/23

## 2023-07-10 ENCOUNTER — OFFICE VISIT (OUTPATIENT)
Dept: AUDIOLOGY | Facility: CLINIC | Age: 78
End: 2023-07-10
Payer: COMMERCIAL

## 2023-07-10 ENCOUNTER — ALLIED HEALTH/NURSE VISIT (OUTPATIENT)
Dept: RESEARCH | Facility: CLINIC | Age: 78
End: 2023-07-10

## 2023-07-10 DIAGNOSIS — Z00.6 RESEARCH STUDY PATIENT: Primary | ICD-10-CM

## 2023-07-10 DIAGNOSIS — Z00.6 EXAMINATION OF PARTICIPANT OR CONTROL IN CLINICAL RESEARCH: Primary | ICD-10-CM

## 2023-07-10 PROCEDURE — V5020 CONFORMITY EVALUATION: HCPCS | Performed by: AUDIOLOGIST

## 2023-07-10 PROCEDURE — 92556 SPEECH AUDIOMETRY COMPLETE: CPT | Performed by: AUDIOLOGIST

## 2023-07-10 PROCEDURE — 99207 PR NO CHARGE NURSE ONLY: CPT

## 2023-07-10 NOTE — PROGRESS NOTES
[place]acy Study Clinic Visit 2 Note    Purpose: This is a prospective intervention-based study that will involve data collection from individuals with measured or perceived mild-to-moderate hearing loss. This study is deemed to meet the qualification of a research study with non-significant risk.        Ruth Pinon a 77 year old female , was seen at the Audiology clinic today to complete clinical visit 2 for the Tracy study.     Real Ear Measures and Speech in Noise were performed during this visit.     Study data was directly entered onto paper source and then entered into EDC later by a Research Coordinator.     All questions were answered and subject voiced understanding    Bessie Bryson, Rin  07/10/23

## 2023-07-10 NOTE — PROGRESS NOTES
[place] Study Clinic Visit 2 Note    Purpose: This is a prospective intervention-based study that will involve data collection from individuals with measured or perceived mild-to-moderate hearing loss. This study is deemed to meet the qualification of a research study with non-significant risk.        Subject ID: [id number]  Treatment Arm: Pro-Fit    Ruth Pinon a 77 year old female , was seen at the Audiology clinic today to complete clinical visit 2 for the Tracy study.     Subject reports that environmental noises (I.e. refrigerator noise) was bothering her. However using the devices in quiet environment is easier for her.       Study data including Real Ear Measures and Speech in Noise was directly entered onto paper source and then entered into [employer] later by Sara Behmanesh.     Were the device serial numbers confirmed? Yes    Was there a change in Subject Disposition?  No  Were there any Adverse Events (AEs) experienced? No  Were there any Protocol Deviations? No    All subject questions were answered and they voiced their understanding.     10-JUL-2023    Sara Behmanesh

## 2023-07-12 ENCOUNTER — TELEPHONE (OUTPATIENT)
Dept: RESEARCH | Facility: CLINIC | Age: 78
End: 2023-07-12
Payer: COMMERCIAL

## 2023-07-12 DIAGNOSIS — Z00.6 RESEARCH SUBJECT: Primary | ICD-10-CM

## 2023-07-12 NOTE — TELEPHONE ENCOUNTER
Tracy Study Unscheduled Call    Study Description: This is a prospective intervention-based study that will involve data collection from individuals with measured or perceived mild-to-moderate hearing loss. This study is deemed to meet the qualification of a research study with non-significant risk.       Subject ID: [id number]      Reason for Call: Field Study Meg 7/11/2023 Participant called d/t difficulty syncing field study meg. Writer found that participant was on the wrong tab of the field study meg. Participant turned off/on the study phone. Reopened field study meg to correct tab and was able to walk participant through data sync. Wore the devices for 3 hours on 7/11/2023 and 2 hours on 7/10/2023. Participant feels confident in future data syncs. All other study procedures followed.       Were there any changes to concomitant meds or treatments? No   (If yes, update concomitant meds/treatment form on eCRF)    Were there any changes to AEs?: No  (If yes, update AE form on eCRF)      12-JUL-2023    Catherine Cortez RN

## 2023-07-31 ENCOUNTER — OFFICE VISIT (OUTPATIENT)
Dept: AUDIOLOGY | Facility: CLINIC | Age: 78
End: 2023-07-31
Payer: COMMERCIAL

## 2023-07-31 ENCOUNTER — ALLIED HEALTH/NURSE VISIT (OUTPATIENT)
Dept: RESEARCH | Facility: CLINIC | Age: 78
End: 2023-07-31

## 2023-07-31 DIAGNOSIS — Z00.6 RESEARCH STUDY PATIENT: Primary | ICD-10-CM

## 2023-07-31 DIAGNOSIS — Z00.6 RESEARCH SUBJECT: Primary | ICD-10-CM

## 2023-07-31 PROCEDURE — 99207 PR NO CHARGE NURSE ONLY: CPT

## 2023-07-31 PROCEDURE — 92556 SPEECH AUDIOMETRY COMPLETE: CPT | Performed by: AUDIOLOGIST

## 2023-07-31 PROCEDURE — V5020 CONFORMITY EVALUATION: HCPCS | Performed by: AUDIOLOGIST

## 2023-07-31 NOTE — PROGRESS NOTES
[place]acy Study Clinic Visit 3 Note    Study Description: This is a prospective intervention-based study that will involve data collection from individuals with measured or perceived mild-to-moderate hearing loss. This study is deemed to meet the qualification of a research study with non-significant risk.       Ruth Pinon a 78 year old female, was seen at the Audiology clinic today to complete Clinic Visit 3 for the Tracy study.     Real Ear Measures and Speech in Noise were performed during this visit.     Study data was directly entered onto paper source and then entered into the EDC later by a Research Coordinator.     All questions were answered and subject voiced understanding.     Bessie Bryson, AuD  07/31/23

## 2023-07-31 NOTE — PROGRESS NOTES
[place]acy Study Clinic Visit 3 Note    Study Description: This is a prospective intervention-based study that will involve data collection from individuals with measured or perceived mild-to-moderate hearing loss. This study is deemed to meet the qualification of a research study with non-significant risk.       Subject ID: [id number]  Treatment Arm: Pro-Fit    Ruth Pinon a 78 year old female, was seen at the Audiology clinic today to complete Clinic Visit 3 for the Tracy study.     Study data including Real Ear Measures and Speech in Noise was directly captured on paper source and then entered into the EDC later by Catherine Cortez RN.     Subject completed IOI-HA in [facility] per protocol.   Participant stated having feedback when headphones would slightly fall out.    Device Accountability Return  Was the Device Kit Returned? Yes  Returned Device Kit ID: [id number]  Date Device Kit Returned: 31-JUL-2023    Were there Device Issues? No    If yes, provide description of device issues, whether device kit was replaced, and replacement device kit ID.     Did the subject complete the study? Yes  Date of Study Completion: 31-JUL-2023    Were any Adverse Events (AEs) experienced? No  Any Protocol Deviations? No    This visit thus completes their participation in the study.     31-JUL-2023    Catherine Cortez RN

## 2023-08-10 ENCOUNTER — HOSPITAL ENCOUNTER (OUTPATIENT)
Dept: BONE DENSITY | Facility: HOSPITAL | Age: 78
Discharge: HOME | End: 2023-08-10
Attending: NURSE PRACTITIONER | Admitting: NURSE PRACTITIONER
Payer: COMMERCIAL

## 2023-08-10 DIAGNOSIS — M81.0 SENILE OSTEOPOROSIS: ICD-10-CM

## 2023-08-10 DIAGNOSIS — Z92.29 PERSONAL HISTORY OF OTHER DRUG THERAPY: ICD-10-CM

## 2023-08-10 PROCEDURE — 77080 DXA BONE DENSITY AXIAL: CPT

## 2023-09-12 ENCOUNTER — MYC MEDICAL ADVICE (OUTPATIENT)
Dept: FAMILY MEDICINE | Facility: CLINIC | Age: 78
End: 2023-09-12
Payer: COMMERCIAL

## 2023-09-12 DIAGNOSIS — Z12.11 SCREEN FOR COLON CANCER: Primary | ICD-10-CM

## 2023-09-13 ENCOUNTER — LAB (OUTPATIENT)
Dept: FAMILY MEDICINE | Facility: CLINIC | Age: 78
End: 2023-09-13
Payer: COMMERCIAL

## 2023-09-13 DIAGNOSIS — Z12.11 SCREEN FOR COLON CANCER: ICD-10-CM

## 2023-09-13 NOTE — TELEPHONE ENCOUNTER
It looks like cologuard had been ordered in Feb 23 visit, but patient never received kit. She received notice that she is due for screening. New order pended here for signature if appropriate.    Corinne RN

## 2023-10-06 ENCOUNTER — IMMUNIZATION (OUTPATIENT)
Dept: FAMILY MEDICINE | Facility: CLINIC | Age: 78
End: 2023-10-06
Payer: COMMERCIAL

## 2023-10-06 DIAGNOSIS — Z23 ENCOUNTER FOR IMMUNIZATION: Primary | ICD-10-CM

## 2023-10-06 PROCEDURE — G0008 ADMIN INFLUENZA VIRUS VAC: HCPCS | Mod: 59

## 2023-10-06 PROCEDURE — 90480 ADMN SARSCOV2 VAC 1/ONLY CMP: CPT

## 2023-10-06 PROCEDURE — 91320 SARSCV2 VAC 30MCG TRS-SUC IM: CPT

## 2023-10-06 PROCEDURE — 90662 IIV NO PRSV INCREASED AG IM: CPT

## 2023-10-06 NOTE — PROGRESS NOTES
Prior to immunization administration, verified patients identity using patient s name and date of birth. Please see Immunization Activity for additional information.     Screening Questionnaire for Adult Immunization    Are you sick today?   No   Do you have allergies to medications, food, a vaccine component or latex?   No   Have you ever had a serious reaction after receiving a vaccination?   No   Do you have a long-term health problem with heart, lung, kidney, or metabolic disease (e.g., diabetes), asthma, a blood disorder, no spleen, complement component deficiency, a cochlear implant, or a spinal fluid leak?  Are you on long-term aspirin therapy?   No   Do you have cancer, leukemia, HIV/AIDS, or any other immune system problem?   No   Do you have a parent, brother, or sister with an immune system problem?   No   In the past 3 months, have you taken medications that affect  your immune system, such as prednisone, other steroids, or anticancer drugs; drugs for the treatment of rheumatoid arthritis, Crohn s disease, or psoriasis; or have you had radiation treatments?   No   Have you had a seizure, or a brain or other nervous system problem?   No   During the past year, have you received a transfusion of blood or blood    products, or been given immune (gamma) globulin or antiviral drug?   No   For women: Are you pregnant or is there a chance you could become       pregnant during the next month?   No   Have you received any vaccinations in the past 4 weeks?   No     Immunization questionnaire answers were all negative.    I have reviewed the following standing orders:   This patient is due and qualifies for the Covid-19 vaccine.     Click here for COVID-19 Standing Order    I have reviewed the vaccines inclusion and exclusion criteria; No concerns regarding eligibility.     This patient is due and qualifies for the Influenza vaccine.    Click here for Influenza Vaccine Standing Order    I have reviewed the  vaccines inclusion and exclusion criteria; No concerns regarding eligibility.     Patient instructed to remain in clinic for 15 minutes afterwards, and to report any adverse reactions.     Screening performed by Tonja Ramos MA on 10/6/2023 at 10:32 AM.

## 2023-11-25 ENCOUNTER — MYC MEDICAL ADVICE (OUTPATIENT)
Dept: FAMILY MEDICINE | Facility: CLINIC | Age: 78
End: 2023-11-25
Payer: COMMERCIAL

## 2023-12-01 ENCOUNTER — OFFICE VISIT (OUTPATIENT)
Dept: FAMILY MEDICINE | Facility: CLINIC | Age: 78
End: 2023-12-01
Payer: COMMERCIAL

## 2023-12-01 ENCOUNTER — ANCILLARY PROCEDURE (OUTPATIENT)
Dept: GENERAL RADIOLOGY | Facility: CLINIC | Age: 78
End: 2023-12-01
Attending: FAMILY MEDICINE
Payer: COMMERCIAL

## 2023-12-01 VITALS
DIASTOLIC BLOOD PRESSURE: 57 MMHG | OXYGEN SATURATION: 100 % | SYSTOLIC BLOOD PRESSURE: 125 MMHG | HEART RATE: 55 BPM | TEMPERATURE: 97.4 F | RESPIRATION RATE: 16 BRPM | WEIGHT: 140.2 LBS | HEIGHT: 67 IN | BODY MASS INDEX: 22 KG/M2

## 2023-12-01 DIAGNOSIS — M54.9 UPPER BACK PAIN: ICD-10-CM

## 2023-12-01 DIAGNOSIS — R20.0 NUMBNESS AND TINGLING OF RIGHT ARM: ICD-10-CM

## 2023-12-01 DIAGNOSIS — R20.0 NUMBNESS AND TINGLING OF RIGHT ARM: Primary | ICD-10-CM

## 2023-12-01 DIAGNOSIS — R20.2 NUMBNESS AND TINGLING OF RIGHT ARM: Primary | ICD-10-CM

## 2023-12-01 DIAGNOSIS — R20.2 NUMBNESS AND TINGLING OF RIGHT ARM: ICD-10-CM

## 2023-12-01 PROCEDURE — 72040 X-RAY EXAM NECK SPINE 2-3 VW: CPT | Mod: TC | Performed by: RADIOLOGY

## 2023-12-01 PROCEDURE — 99214 OFFICE O/P EST MOD 30 MIN: CPT | Performed by: FAMILY MEDICINE

## 2023-12-01 RX ORDER — RESPIRATORY SYNCYTIAL VIRUS VACCINE 120MCG/0.5
0.5 KIT INTRAMUSCULAR ONCE
Qty: 1 EACH | Refills: 0 | Status: CANCELLED | OUTPATIENT
Start: 2023-12-01 | End: 2023-12-01

## 2023-12-01 ASSESSMENT — ENCOUNTER SYMPTOMS: NUMBNESS: 1

## 2023-12-01 NOTE — PROGRESS NOTES
Assessment & Plan     Numbness and tingling of right arm    Suspect that this is likely related to a cervical radiculopathy  Consider possible carpal tunnel syndrome radiating proximally    X-rays of the cervical spine reveal osteoarthritic changes and some joint space narrowing.  X-rays personally reviewed and will be reviewed by radiology    Discussed next steps of evaluation  Offered referral for physical therapy though she has had this discomfort and symptoms of numbness for years  It is appropriate to consider an MRI of the cervical spine  Discussed also that an electromyelogram would be helpful  She will check with insurance regarding possible coverage  Consider referral to the spine care clinic as well    - XR Cervical Spine 2/3 Views; Future    Upper back pain    Suspect this may be secondary to a cervical radiculopathy  Consider possible myofascial pain    Recommend further evaluation as noted above  She may take Tylenol as needed  Consider application of lidocaine patches as needed    - XR Cervical Spine 2/3 Views; Future    Spent 30 minutes including time for chart preparation review as well as time with patient and reviewing the x-ray reports and plan in detail                 Hugo Clarke MD  [facility]    Dino Miranda is a 78 year old, presenting for the following health issues:  Numbness (Numbness and tingling in right arm )        12/1/2023    10:06 AM   Additional Questions   Roomed by Tonja TURCIOS CMA     Ruth is a 78-year-old female, patient of Dr. Parrish, who presents to the clinic as she has had chronic numbness and tingling in her right arm and hand which has been present over the past several years.  She states that she often will have numbness that is worse at night.  She is not sure if she sleeps in an unusual position.  She denies obvious neck injury.  She does have some shoulder pain at times.  She also has had some discomfort in the right upper  "back area which has been chronic.  She has not had focal weakness, slurred speech, or confusion.  Medical history is notable for emphysema as well as osteopenia.  She notes that her symptoms did seem to get worse when she was treated with Prolia most recently.      Numbness  Associated symptoms include numbness.   History of Present Illness       Reason for visit:  Chronic numbness and tingling in rt. arm and hand  Symptom onset:  More than a month  Symptoms include:  Numbness, tingling  & pain in right hand and arm  Symptom intensity:  Moderate  Symptom progression:  Worsening  Had these symptoms before:  Yes  Has tried/received treatment for these symptoms:  No  What makes it better:  When the symptoms subsideShe consumes 0 sweetened beverage(s) daily.She exercises with enough effort to increase her heart rate 20 to 29 minutes per day.  She exercises with enough effort to increase her heart rate 4 days per week.   She is taking medications regularly.                 Review of Systems   Neurological:  Positive for numbness.            Objective    /57 (BP Location: Left arm, Patient Position: Sitting, Cuff Size: Adult Small)   Pulse 55   Temp 97.4  F (36.3  C) (Oral)   Resp 16   Ht 1.702 m (5' 7\")   Wt 63.6 kg (140 lb 3.2 oz)   LMP  (LMP Unknown)   SpO2 100%   BMI 21.96 kg/m    Body mass index is 21.96 kg/m .  Physical Exam   GENERAL: healthy, alert and no distress  EYES: Eyes grossly normal to inspection, PERRL and conjunctivae and sclerae normal  NECK: no adenopathy and there is no tenderness over the cervical spine  RESP: lungs clear to auscultation - no rales, rhonchi or wheezes  CV: regular rate and rhythm, normal S1 S2, no S3 or S4, no murmur, click or rub, no peripheral edema and peripheral pulses strong  MS: In evaluating range of motion she does have some pain that radiates to the right upper back when turning her head  Right shoulder examination is normal with normal range of motion.  There " is no pain with abduction or adduction  There may be mild thenar atrophy.  Tinel's sign is negative.  PSYCH: mentation appears normal, affect normal/bright

## 2023-12-01 NOTE — PATIENT INSTRUCTIONS
Ruth,    We reviewed the numbness in your right arm  This may be related to your neck  We did baseline x-rays which will be reviewed by radiology  We can consider an MRI of the cervical spine.  I do recommend that you check with insurance regarding coverage  Another good option is to consider seeing the spine care clinic for further evaluation and treatment recommendations  Ultimately, you may need an EMG/electromyelogram which measures the nerve conduction and can help pinpoint what is the cause of the numbness    Hugo Clarke MD

## 2023-12-29 ENCOUNTER — LAB (OUTPATIENT)
Dept: LAB | Facility: CLINIC | Age: 78
End: 2023-12-29
Payer: COMMERCIAL

## 2023-12-29 DIAGNOSIS — Z92.29 PERSONAL HISTORY OF OTHER DRUG THERAPY: ICD-10-CM

## 2023-12-29 DIAGNOSIS — M81.0 SENILE OSTEOPOROSIS: ICD-10-CM

## 2023-12-29 LAB
CALCIUM SERPL-MCNC: 9.8 MG/DL (ref 8.8–10.2)
CREAT SERPL-MCNC: 1.06 MG/DL (ref 0.51–0.95)
EGFRCR SERPLBLD CKD-EPI 2021: 54 ML/MIN/1.73M2

## 2023-12-29 PROCEDURE — 82565 ASSAY OF CREATININE: CPT

## 2023-12-29 PROCEDURE — 82310 ASSAY OF CALCIUM: CPT

## 2023-12-29 PROCEDURE — 36415 COLL VENOUS BLD VENIPUNCTURE: CPT

## 2024-01-03 ENCOUNTER — ALLIED HEALTH/NURSE VISIT (OUTPATIENT)
Dept: ENDOCRINOLOGY | Facility: CLINIC | Age: 79
End: 2024-01-03
Payer: COMMERCIAL

## 2024-01-03 DIAGNOSIS — E04.9 THYROID GOITER: ICD-10-CM

## 2024-01-03 DIAGNOSIS — E03.9 HYPOTHYROIDISM: ICD-10-CM

## 2024-01-03 DIAGNOSIS — Z92.29 PERSONAL HISTORY OF OTHER DRUG THERAPY: ICD-10-CM

## 2024-01-03 DIAGNOSIS — M81.0 SENILE OSTEOPOROSIS: Primary | ICD-10-CM

## 2024-01-03 PROCEDURE — 99207 PR NO CHARGE NURSE ONLY: CPT

## 2024-01-03 PROCEDURE — 96372 THER/PROPH/DIAG INJ SC/IM: CPT | Performed by: NURSE PRACTITIONER

## 2024-01-03 NOTE — PROGRESS NOTES
Clinic Administered Medication Documentation      Prolia Documentation    Indication: Prolia  (denosumab) is a prescription medicine used to treat osteoporosis in patients who:   Are at high risk for fracture, meaning patients who have had a fracture related to osteoporosis, or who have multiple risk factors for fracture.  Cannot use another osteoporosis medicine or other osteoporosis medicines did not work well.  The timeline for early/late injections would be 4 weeks early and any time after the 6 month alayna. If a patient receives their injection late, then the subsequent injection would be 6 months from the date that they actually received the injection.    When was the last injection?  23  Was the last injection at least 6 months ago? Yes  Has the prior authorization been completed?  Yes  Is there an active order (written within the past 365 days, with administrations remaining, not ) in the chart?  Yes  Patient denies any dental work involving the bone (e.g. tooth extraction or dental implants) in the past 4 weeks?  Yes  Patient denies plans for any dental work involving the bone (e.g. tooth extraction or dental implants) in the next 4 weeks? Yes    The following steps were completed to comply with the REMS program for Prolia:  Reviewed information in the Medication Guide and Patient Counseling Chart, including the serious risks of Prolia  and the symptoms of each risk.  Advised patient to seek prompt medical attention if they have signs or symptoms of any of the serious risks.  Provided each patient a copy of the Medication Guide and Patient Brochure.    Prior to injection, verified patient identity using patient's name and date of birth. Medication was administered. Please see MAR and medication order for additional information. Patient instructed to remain in clinic for 15 minutes and report any adverse reaction to staff immediately.    Vial/Syringe: Syringe  Was this medication supplied by the  patient? No  Verified that the patient has refills remaining in their prescription.

## 2024-01-04 RX ORDER — LEVOTHYROXINE SODIUM 25 UG/1
TABLET ORAL
Qty: 90 TABLET | Refills: 1 | Status: SHIPPED | OUTPATIENT
Start: 2024-01-04 | End: 2024-07-11

## 2024-01-04 NOTE — TELEPHONE ENCOUNTER
Requested Prescriptions   Pending Prescriptions Disp Refills    levothyroxine (SYNTHROID/LEVOTHROID) 25 MCG tablet [Pharmacy Med Name: LEVOTHYROXINE SODIUM 25MCG TABS] 90 tablet 0     Sig: TAKE ONE TABLET BY MOUTH ONCE DAILY       Thyroid Protocol Passed - 1/3/2024  3:26 PM        Passed - Patient is 12 years or older        Passed - Recent (12 mo) or future (30 days) visit within the authorizing provider's specialty     The patient must have completed an in-person or virtual visit within the past 12 months or has a future visit scheduled within the next 90 days with the authorizing provider s specialty.  Urgent care and e-visits do not quality as an office visit for this protocol.          Passed - Medication is active on med list        Passed - Normal TSH on file in past 12 months     Recent Labs   Lab Test 02/16/23  1435   TSH 1.69              Passed - No active pregnancy on record     If patient is pregnant or has had a positive pregnancy test, please check TSH.          Passed - No positive pregnancy test in past 12 months     If patient is pregnant or has had a positive pregnancy test, please check TSH.

## 2024-01-22 ENCOUNTER — THERAPY VISIT (OUTPATIENT)
Dept: PHYSICAL THERAPY | Facility: CLINIC | Age: 79
End: 2024-01-22
Payer: COMMERCIAL

## 2024-01-22 DIAGNOSIS — M54.50 LUMBAR BACK PAIN: ICD-10-CM

## 2024-01-22 DIAGNOSIS — M54.6 CHRONIC RIGHT-SIDED THORACIC BACK PAIN: ICD-10-CM

## 2024-01-22 DIAGNOSIS — G89.29 CHRONIC RIGHT-SIDED THORACIC BACK PAIN: ICD-10-CM

## 2024-01-22 DIAGNOSIS — M54.2 CERVICALGIA: ICD-10-CM

## 2024-01-22 DIAGNOSIS — R20.0 NUMBNESS AND TINGLING OF RIGHT UPPER EXTREMITY: Primary | ICD-10-CM

## 2024-01-22 DIAGNOSIS — R20.2 NUMBNESS AND TINGLING OF RIGHT UPPER EXTREMITY: Primary | ICD-10-CM

## 2024-01-22 PROCEDURE — 97162 PT EVAL MOD COMPLEX 30 MIN: CPT | Mod: GP

## 2024-01-22 PROCEDURE — 97112 NEUROMUSCULAR REEDUCATION: CPT | Mod: GP

## 2024-01-22 NOTE — PROGRESS NOTES
"PHYSICAL THERAPY EVALUATION  Type of Visit: Evaluation    See electronic medical record for Abuse and Falls Screening details.    Subjective       Presenting condition or subjective complaint: Pain under my right should blade, Numbness, pain,  and tingling in my right arm that prevents me from grasping items, interferes with my sleep.  Also pain in my lower back, right side that is stabbing. Every night her arm \"falls asleep\" with N/T and pain all the way down to her right hand (into palm). Continuous tightness in UT and around R shoulder blade. She has noticed a temperature change from R to L.  Typically in the morning pt notes that it takes about 1 hour before she can  with her R.     Also concerned with LBP on R that presents more with dancing. She hears clicking in that region and her symptoms are alleviated with \"sciatic type movement\". Ruth notes that her upper back pain is possibly due to a lump to the R of thoracic spine. The upper back pain is increased with sitting too long.    Date of onset: 01/22/24 (Date of Eval. Chronic condition.)    Relevant medical history: Arthritis; Bladder or bowel problems; Cancer; Chest pain; COPD; Dizziness; Emphysema; Hearing problems; Kidney disease; Menopause; Migraines or headaches; Osteoporosis; Pain at night or rest; Thyroid problems; Vision problems   Dates & types of surgery:      Prior diagnostic imaging/testing results:     Xray - cervical (structurally fine)   Prior therapy history for the same diagnosis, illness or injury: No      Living Environment  Social support: Alone   Type of home: [facility]   Stairs to enter the home: Yes 10 Is there a railing: Yes   Ramp: No   Stairs inside the home: Yes 40 Is there a railing: Yes   Help at home: None  Equipment owned:       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    Pain assessment: See objective evaluation for additional pain details     Objective   SHOULDER EVALUATION  PAIN: Pain Level at Rest: 0/10  Pain Level with Use: 8/10  Pain is Exacerbated By: Sleeping (2-3 hrs at a time), exercising, gripping items,    Pain is Relieved By: shaking hands, heat in upper back.     INTEGUMENTARY (edema, incisions): WNL    POSTURE: WNL for upper and lower body posture.     BALANCE/PROPRIOCEPTION: WNL  WEIGHTBEARING ALIGNMENT: WNL  ROM:   (Degrees) Left AROM Left MMT Right AROM  Right MMT   Shoulder Flexion WNL 5 WNL 5   Shoulder Extension       Shoulder Abduction WNL 5 WNL 5   Shoulder Adduction       Shoulder Internal Rotation WNL 5 WNL 5   Shoulder External Rotation WNL 5 WNL 5   Shoulder Horizontal Abduction       Shoulder Horizontal Adduction       Shoulder Flexion ER       Shoulder Flexion IR       Elbow Extension       Elbow Flexion       Pain: INCREASED symptoms with end range shoulder motion.   End feel:    strength:    R Elbow Bent: 55# Elbow Straight: 50#   L Elbow Bent: 55# Elbow Straight: 50#  FLEXIBILITY: WNL  SPECIAL TESTS: WNL  PALPATION:  Increased tension noted in B UT, LS, and SO muscles. TTP at left 1st rib, occipital region, and R clavicle.   JOINT MOBILITY:  Cervical spinal joints are hypomobile in all directions.   CERVICAL SCREEN:   (Degrees) Left AROM Right AROM   Cervical Flexion WNL   Cervical Extension WNL - increased N/T on R (7 second recovery)   Side bend Limited Limited   Rotation Limited Limited (pain on L)   Thoracic Flexion WNL   Thoracic Extension WNL   Thoracic Rotation WNL WNL   Thoracic Outlet Screen (Lou, Adson) Negative  Positive      Left Right   Alar Ligament Negative    Cervical Flexion-Rotation     Cervical Rot/Lateral Flex Positive Negative    Compression Positive Positive   Distraction  Negative  Negative    Spurling s Positive Positive   Thoracic Outlet Screen (Lou, Adson) Negative  Positive   Transverse Ligament Negative  Negative    Vertebral Artery Negative  Negative        Assessment & Plan   CLINICAL IMPRESSIONS  Medical Diagnosis: Cervicalgia, chronic right sided  thoracic back pain, lumbar back pain, numbness and tingling of right upper extremity.    Treatment Diagnosis: Cervicalgia, chronic right sided thoracic back pain, lumbar back pain, numbness and tingling of right upper extremity.   Impression/Assessment: Patient is a 78 year old female with primary concerns of right upper extremity numbness, tingling, and pain. Secondary concerns are thoracic and lumbar pain.  The following significant findings have been identified: Pain, Decreased ROM/flexibility, Decreased joint mobility, Impaired sensation, Impaired muscle performance, Decreased activity tolerance, and circulation limitations with end range right shoulder motions . These impairments interfere with their ability to perform self care tasks, recreational activities, household chores, driving , household mobility, community mobility, and sleep  as compared to previous level of function.     Clinical Decision Making (Complexity):  Clinical Presentation: Unstable/Unpredictable   Clinical Presentation Rationale: based on medical and personal factors listed in PT evaluation  Clinical Decision Making (Complexity): Moderate complexity    PLAN OF CARE  Treatment Interventions:  Modalities: Cryotherapy, E-stim, Hot Pack, Mechanical Traction  Interventions: Gait Training, Manual Therapy, Neuromuscular Re-education, Therapeutic Activity, Therapeutic Exercise, Self-Care/Home Management    Long Term Goals     PT Goal 1  Goal Identifier: LTG 1 - Sleep  Goal Description: Pt will report being able to sleep for at least 5 hours at a time in order to progress towards restorative sleep habits.  Target Date: 03/18/24  PT Goal 2  Goal Identifier: LTG 2  - N/T  Goal Description: Pt will report no numbness or tingling in right upper extremity in order to demonstrate improved sensation.  Target Date: 03/18/24  PT Goal 3  Goal Identifier: LTG 3  Goal Description: Pt will demonstrate cervical ROM WNL and symptom free in order to improve mobility required for daily activities.  Target Date: 03/18/24      Frequency of Treatment: 1x/week  Duration of Treatment: 8 weeks    Education Assessment:   Learner/Method: Patient;Listening;Reading;Demonstration;Pictures/Video    Risks and benefits of evaluation/treatment have been explained.   Patient/Family/caregiver agrees with Plan of Care.     Evaluation Time:     PT Eval, Moderate Complexity Minutes (49567): 20       Signing Clinician: Kaur Newton, PT      [facility]                                                                                   OUTPATIENT PHYSICAL THERAPY      PLAN OF TREATMENT FOR OUTPATIENT REHABILITATION   Patient's Last Name, First Name, Ruth Hernández YOB: 1945   Provider's Name   [facility]   Medical Record No.  [id number]     Onset Date: 01/22/24 (Date of Eval. Chronic condition.)  Start of Care Date: 01/22/24     Medical Diagnosis:  Cervicalgia, chronic right sided  thoracic back pain, lumbar back pain, numbness and tingling of right upper extremity.      PT Treatment Diagnosis:  Cervicalgia, chronic right sided thoracic back pain, lumbar back pain, numbness and tingling of right upper extremity. Plan of Treatment  Frequency/Duration: 1x/week/ 8 weeks    Certification date from 01/22/24 to 03/18/24         See note for plan of treatment details and functional goals     Kaur Newton, PT                         I CERTIFY THE NEED FOR THESE SERVICES FURNISHED UNDER        THIS PLAN OF TREATMENT AND WHILE UNDER MY CARE     (Physician attestation of this document indicates review and certification of the  therapy plan).              Referring Provider:  Hugo Clarke    Initial Assessment  See Epic Evaluation- Start of Care Date: 01/22/24

## 2024-01-25 ENCOUNTER — THERAPY VISIT (OUTPATIENT)
Dept: PHYSICAL THERAPY | Facility: CLINIC | Age: 79
End: 2024-01-25
Payer: COMMERCIAL

## 2024-01-25 DIAGNOSIS — R20.0 NUMBNESS AND TINGLING OF RIGHT UPPER EXTREMITY: Primary | ICD-10-CM

## 2024-01-25 DIAGNOSIS — M54.6 CHRONIC RIGHT-SIDED THORACIC BACK PAIN: ICD-10-CM

## 2024-01-25 DIAGNOSIS — R20.2 NUMBNESS AND TINGLING OF RIGHT UPPER EXTREMITY: Primary | ICD-10-CM

## 2024-01-25 DIAGNOSIS — G89.29 CHRONIC RIGHT-SIDED THORACIC BACK PAIN: ICD-10-CM

## 2024-01-25 DIAGNOSIS — M54.50 LUMBAR BACK PAIN: ICD-10-CM

## 2024-01-25 DIAGNOSIS — M54.2 CERVICALGIA: ICD-10-CM

## 2024-01-25 PROCEDURE — 97110 THERAPEUTIC EXERCISES: CPT | Mod: GP

## 2024-01-25 PROCEDURE — 97140 MANUAL THERAPY 1/> REGIONS: CPT | Mod: GP

## 2024-02-05 ENCOUNTER — THERAPY VISIT (OUTPATIENT)
Dept: PHYSICAL THERAPY | Facility: CLINIC | Age: 79
End: 2024-02-05
Payer: COMMERCIAL

## 2024-02-05 DIAGNOSIS — R20.0 NUMBNESS AND TINGLING OF RIGHT UPPER EXTREMITY: Primary | ICD-10-CM

## 2024-02-05 DIAGNOSIS — G89.29 CHRONIC RIGHT-SIDED THORACIC BACK PAIN: ICD-10-CM

## 2024-02-05 DIAGNOSIS — M54.50 LUMBAR BACK PAIN: ICD-10-CM

## 2024-02-05 DIAGNOSIS — R20.2 NUMBNESS AND TINGLING OF RIGHT UPPER EXTREMITY: Primary | ICD-10-CM

## 2024-02-05 DIAGNOSIS — M54.6 CHRONIC RIGHT-SIDED THORACIC BACK PAIN: ICD-10-CM

## 2024-02-05 DIAGNOSIS — M54.2 CERVICALGIA: ICD-10-CM

## 2024-02-05 LAB — NONINV COLON CA DNA+OCC BLD SCRN STL QL: NEGATIVE

## 2024-02-05 PROCEDURE — 97140 MANUAL THERAPY 1/> REGIONS: CPT | Mod: GP

## 2024-02-05 PROCEDURE — 97530 THERAPEUTIC ACTIVITIES: CPT | Mod: GP

## 2024-02-05 PROCEDURE — 97110 THERAPEUTIC EXERCISES: CPT | Mod: GP

## 2024-02-12 ENCOUNTER — PATIENT OUTREACH (OUTPATIENT)
Dept: CARE COORDINATION | Facility: CLINIC | Age: 79
End: 2024-02-12

## 2024-02-12 ENCOUNTER — THERAPY VISIT (OUTPATIENT)
Dept: PHYSICAL THERAPY | Facility: CLINIC | Age: 79
End: 2024-02-12
Payer: COMMERCIAL

## 2024-02-12 DIAGNOSIS — G89.29 CHRONIC RIGHT-SIDED THORACIC BACK PAIN: ICD-10-CM

## 2024-02-12 DIAGNOSIS — M54.2 CERVICALGIA: ICD-10-CM

## 2024-02-12 DIAGNOSIS — R20.2 NUMBNESS AND TINGLING OF RIGHT UPPER EXTREMITY: Primary | ICD-10-CM

## 2024-02-12 DIAGNOSIS — M54.50 LUMBAR BACK PAIN: ICD-10-CM

## 2024-02-12 DIAGNOSIS — M54.6 CHRONIC RIGHT-SIDED THORACIC BACK PAIN: ICD-10-CM

## 2024-02-12 DIAGNOSIS — R20.0 NUMBNESS AND TINGLING OF RIGHT UPPER EXTREMITY: Primary | ICD-10-CM

## 2024-02-12 PROCEDURE — 97140 MANUAL THERAPY 1/> REGIONS: CPT | Mod: GP

## 2024-02-12 PROCEDURE — 97110 THERAPEUTIC EXERCISES: CPT | Mod: GP

## 2024-02-12 NOTE — PROGRESS NOTES
02/12/24 0500   Appointment Info   Signing clinician's name / credentials Lillie Newton, PT, DPT   Total/Authorized Visits 8   Visits Used 4   Medical Diagnosis Cervicalgia, chronic right sided  thoracic back pain, lumbar back pain, numbness and tingling of right upper extremity.   PT Tx Diagnosis Cervicalgia, chronic right sided thoracic back pain, lumbar back pain, numbness and tingling of right upper extremity.   Quick Adds Certification   Progress Note/Certification   Start of Care Date 01/22/24   Onset of illness/injury or Date of Surgery 01/22/24  (Date of Eval. Chronic condition.)   Therapy Frequency 1x/week   Predicted Duration 8 weeks   Certification date from 01/22/24   Certification date to 03/18/24   GOALS   PT Goals 3   PT Goal 1   Goal Identifier LTG 1 - Sleep   Goal Description Pt will report being able to sleep for at least 5 hours at a time in order to progress towards restorative sleep habits.   Goal Progress 3 wakes per night.   Target Date 03/18/24   PT Goal 2   Goal Identifier LTG 2 - N/T   Goal Description Pt will report no numbness or tingling in right upper extremity in order to demonstrate improved sensation.   Goal Progress no change   Target Date 03/18/24   PT Goal 3   Goal Identifier LTG 3   Goal Description Pt will demonstrate cervical ROM WNL and symptom free in order to improve mobility required for daily activities.   Goal Progress no change.   Target Date 03/18/24   Subjective Report   Subjective Report Ruth notes that her LBP has been off/on and not as bad as prior. She does still have R shoulderblade and arm N/T of same intensity and continued waking at night.   Objective Measures   Objective Measures Objective Measure 4   Objective Measure 1   Objective Measure Lumbar ROM:   Details Flex: 100%, Ext: 100%, SGR: 75%, SGL: 50%   Objective Measure 2   Objective Measure Quadrant screen   Details Positive R posterior quadrant for R SIJ symptoms.   Objective Measure 3   Objective  Measure Cervical ROM:   Details FLEX: 55 Ext: 40 SBR: 15 SBL: 20 ROTR: 55 ROTL: 48 (pain at occiput)   Objective Measure 4   Objective Measure Lou:   Details Positive on R at 25 seconds.   Treatment Interventions (PT)   Interventions Therapeutic Activity;Neuromuscular Re-education;Manual Therapy;Therapeutic Procedure/Exercise   Therapeutic Procedure/Exercise   Therapeutic Procedures: strength, endurance, ROM, flexibillity minutes (26087) 23   PTRx Ther Proc 1 All 4s Cat Cow   PTRx Ther Proc 1 - Details x 10    PTRx Ther Proc 2 Thread the Needle   PTRx Ther Proc 2 - Details x 10 on B   PTRx Ther Proc 3 Supine Lumbar Hip Roll   PTRx Ther Proc 3 - Details REviewed for HEP   PTRx Ther Proc 4 Neutral Spine Slouch Overcorrect   PTRx Ther Proc 4 - Details x 10    PTRx Ther Proc 5 Upper Trapezius Stretch   PTRx Ther Proc 5 - Details x 30 sec on R.    PTRx Ther Proc 6 Levator Scapulae Stretch   PTRx Ther Proc 6 - Details x 30 sec on R.    PTRx Ther Proc 7 Finger Active Range of Motion Tendon Glides Fist Series   PTRx Ther Proc 7 - Details Reviewed for HEP   PTRx Ther Proc 8 Shoulder Theraband Rows   PTRx Ther Proc 8 - Details Reviewed for HEP   PTRx Ther Proc 9 Shoulder Theraband Low Row/Pulldown   PTRx Ther Proc 9 - Details Reviewed for HEP   PTRx Ther Proc 10 PNF Diagonal 2   PTRx Ther Proc 10 - Details REviewed for HEP   PTRx Ther Proc 11 Cervical Retraction   PTRx Ther Proc 11 - Details x 10    PTRx Ther Proc 12 Scapular Retraction/Depression   PTRx Ther Proc 12 - Details x 10    Skilled Intervention Continued to address psinal mobility and UE symptoms with stretches.   Patient Response/Progress Pt had variable right hand n/t throughout.   PTRx Ther Proc 13 Thoracic Elongation Latissimus Dorsi Stretch   PTRx Ther Proc 13 - Details 10 x 10 sec   PTRx Ther Proc 14 Thoracic Extension   PTRx Ther Proc 14 - Details 10 x 10 sec   Ther Proc 1 Doorway pec stretch   Ther Proc 1 - Details Low hands 2 x 30 sec   Therapeutic  Activity   Therapeutic Activities Ther Act 2   Ther Act 1 Education   Ther Act 1 - Details Reviewed anatomy, physiology, and regional interdependence. Discussed findings of evaluation, relevance to pt's symptoms, and possible further evaluation given eval findings. Reviewed POC and treatment options.   Ther Act 2 Discussion of findings for lumbar symptoms   Ther Act 2 - Details Reviewed importance of focused mobility and strength to lead to a strong dynamically mobile system.   Patient Response/Progress Pt demonstrated understanding with relevant questions   Neuromuscular Re-education   Neuro Re-ed 1 Reviewee importance of small gentle glides   Neuro Re-ed 1 - Details Discussed nerve anatomy and importance of not pushing into symptoms.   PTRx Neuro Re-ed 1 Nerve Gliding Proximal Median   PTRx Neuro Re-ed 1 - Details No Notes   PTRx Neuro Re-ed 2 Nerve Gliding Proximal Ulnar   PTRx Neuro Re-ed 2 - Details No Notes   PTRx Neuro Re-ed 3 Nerve Gliding Proximal Radial   PTRx Neuro Re-ed 3 - Details No Notes   Manual Therapy   Manual Therapy: Mobilization, MFR, MLD, friction massage minutes (24657) 15   Manual Therapy Manual Therapy 5   Manual Therapy 1 Supine STM   Manual Therapy 1 - Details B (R>L) SO, PS, UT, LS, scalene   Manual Therapy 2 Supine cervical glides   Manual Therapy 2 - Details PA, lateral R to L grade 1-2 for pain relief and gentle mobility   Manual Therapy 3 Prone thoracic CPA   Manual Therapy 3 - Details grade 1-2 for pain.   Manual Therapy 4 Prone STM   Manual Therapy 4 - Details thoracic ps, rhomboids, UT.   Manual Therapy 5 Pelvic Alignment Assessment: Neutral   Manual Therapy 5 - Details HELD Correction: Long axis traction, grade 3-4 sacral base mobs.   Skilled Intervention Improve thoracic and cervical mobility   Patient Response/Progress Improved mobility and reduced symptoms following.   Education   Learner/Method Patient;Listening;Reading;Demonstration;Pictures/Video   Plan   Home program  updated HO provided   Plan for next session Continue nerve mobility, cervical ROM, and upper body stretching.   Comments   Comments positive Lou testing for thoracic outlet may benefit from further imaging if symptoms persist.   Total Session Time   Timed Code Treatment Minutes 38   Total Treatment Time (sum of timed and untimed services) 38       PLAN  After 4 visits patient continues to have significant numbness and tingling in Right upper extremity. She continues to wake  3+ times a night due to her pain. She has been consistent with her HEP items without change. She continues to test positive with LOU test for right sided thoracic outlet syndrome. She would benefit from further cervical and upper thoracic imaging to rule out thoracic outlet syndrome.     Beginning/End Dates of Progress Note Reporting Period:    to 02/12/2024    Referring Provider:  Hugo Clarke

## 2024-02-20 ENCOUNTER — OFFICE VISIT (OUTPATIENT)
Dept: FAMILY MEDICINE | Facility: CLINIC | Age: 79
End: 2024-02-20
Payer: COMMERCIAL

## 2024-02-20 VITALS
DIASTOLIC BLOOD PRESSURE: 56 MMHG | HEIGHT: 67 IN | BODY MASS INDEX: 21.88 KG/M2 | SYSTOLIC BLOOD PRESSURE: 131 MMHG | TEMPERATURE: 97.7 F | HEART RATE: 55 BPM | RESPIRATION RATE: 16 BRPM | OXYGEN SATURATION: 100 % | WEIGHT: 139.4 LBS

## 2024-02-20 DIAGNOSIS — Z01.818 PREOP GENERAL PHYSICAL EXAM: Primary | ICD-10-CM

## 2024-02-20 DIAGNOSIS — J43.9 PULMONARY EMPHYSEMA, UNSPECIFIED EMPHYSEMA TYPE (H): ICD-10-CM

## 2024-02-20 DIAGNOSIS — Z13.220 LIPID SCREENING: ICD-10-CM

## 2024-02-20 DIAGNOSIS — E04.9 THYROID GOITER: ICD-10-CM

## 2024-02-20 DIAGNOSIS — N18.31 CHRONIC KIDNEY DISEASE, STAGE 3A (H): ICD-10-CM

## 2024-02-20 DIAGNOSIS — M85.80 OSTEOPENIA, UNSPECIFIED LOCATION: ICD-10-CM

## 2024-02-20 DIAGNOSIS — C50.919 MALIGNANT NEOPLASM OF FEMALE BREAST, UNSPECIFIED ESTROGEN RECEPTOR STATUS, UNSPECIFIED LATERALITY, UNSPECIFIED SITE OF BREAST (H): ICD-10-CM

## 2024-02-20 LAB
ALBUMIN SERPL BCG-MCNC: 4.2 G/DL (ref 3.5–5.2)
ALP SERPL-CCNC: 63 U/L (ref 40–150)
ALT SERPL W P-5'-P-CCNC: 45 U/L (ref 0–50)
ANION GAP SERPL CALCULATED.3IONS-SCNC: 10 MMOL/L (ref 7–15)
AST SERPL W P-5'-P-CCNC: 39 U/L (ref 0–45)
BILIRUB SERPL-MCNC: 0.4 MG/DL
BUN SERPL-MCNC: 14.4 MG/DL (ref 8–23)
CALCIUM SERPL-MCNC: 9.4 MG/DL (ref 8.8–10.2)
CHLORIDE SERPL-SCNC: 106 MMOL/L (ref 98–107)
CHOLEST SERPL-MCNC: 200 MG/DL
CREAT SERPL-MCNC: 1.07 MG/DL (ref 0.51–0.95)
DEPRECATED HCO3 PLAS-SCNC: 24 MMOL/L (ref 22–29)
EGFRCR SERPLBLD CKD-EPI 2021: 54 ML/MIN/1.73M2
ERYTHROCYTE [DISTWIDTH] IN BLOOD BY AUTOMATED COUNT: 12.3 % (ref 10–15)
FASTING STATUS PATIENT QL REPORTED: NO
GLUCOSE SERPL-MCNC: 87 MG/DL (ref 70–99)
HCT VFR BLD AUTO: 40 % (ref 35–47)
HDLC SERPL-MCNC: 61 MG/DL
HGB BLD-MCNC: 13.2 G/DL (ref 11.7–15.7)
LDLC SERPL CALC-MCNC: 120 MG/DL
MCH RBC QN AUTO: 30.8 PG (ref 26.5–33)
MCHC RBC AUTO-ENTMCNC: 33 G/DL (ref 31.5–36.5)
MCV RBC AUTO: 93 FL (ref 78–100)
NONHDLC SERPL-MCNC: 139 MG/DL
PLATELET # BLD AUTO: 142 10E3/UL (ref 150–450)
POTASSIUM SERPL-SCNC: 4.5 MMOL/L (ref 3.4–5.3)
PROT SERPL-MCNC: 6.7 G/DL (ref 6.4–8.3)
RBC # BLD AUTO: 4.29 10E6/UL (ref 3.8–5.2)
SODIUM SERPL-SCNC: 140 MMOL/L (ref 135–145)
TRIGL SERPL-MCNC: 96 MG/DL
WBC # BLD AUTO: 5.1 10E3/UL (ref 4–11)

## 2024-02-20 PROCEDURE — 80061 LIPID PANEL: CPT | Performed by: FAMILY MEDICINE

## 2024-02-20 PROCEDURE — 99214 OFFICE O/P EST MOD 30 MIN: CPT | Performed by: FAMILY MEDICINE

## 2024-02-20 PROCEDURE — 36415 COLL VENOUS BLD VENIPUNCTURE: CPT | Performed by: FAMILY MEDICINE

## 2024-02-20 PROCEDURE — 80053 COMPREHEN METABOLIC PANEL: CPT | Performed by: FAMILY MEDICINE

## 2024-02-20 PROCEDURE — 85027 COMPLETE CBC AUTOMATED: CPT | Performed by: FAMILY MEDICINE

## 2024-02-20 NOTE — PROGRESS NOTES
Preoperative Evaluation  [facility]  [address] [facility] [address]  Phone: [phone]  Fax: [phone]  Primary Provider: Hugo Clarke  Pre-op Performing Provider: ANGEL MIKE  Feb 20, 2024       Ruth is a 78 year old, presenting for the following:  Pre-Op Exam        2/20/2024     9:33 AM   Additional Questions   Roomed by      Surgical Information  Surgery/Procedure: Breast reduction  Surgery Location: [facility]   Surgeon: Dr. Alatorre  Surgery Date: 3/7/24  Time of Surgery: TBD  Where patient plans to recover: At home with family  Fax number for surgical facility: [phone]    1. Preop general physical exam  Ruth is approved for surgery with general anesthesia.  - CBC with platelets; Future  - Comprehensive metabolic panel; Future  - CBC with platelets  - Comprehensive metabolic panel    2. Malignant neoplasm of female breast, unspecified estrogen receptor status, unspecified laterality, unspecified site of breast (H)  Previous lumpectomy, now with some asymmetry and chronic neck pain.    3. Chronic kidney disease, stage 3a (H)  This has been fairly stable.  Will recheck today.    4. Pulmonary emphysema, unspecified emphysema type (H)  This is asymptomatic.  This was picked up incidentally on some imaging.    5. Thyroid goiter  She has been stable on her current dose of Synthroid.  She follows with endocrinology.    6. Osteopenia, unspecified location  She has been getting Prolia.    7. Lipid screening  She has her annual wellness next week but we will check her lipids as we are doing blood work today.  - Lipid panel reflex to direct LDL Fasting; Future  - Lipid panel reflex to direct LDL Fasting      Subjective       Via the Health Maintenance questionnaire, the patient has reported the following services have been completed , this information has been sent to the abstraction team.  HPI related to upcoming procedure:   Ruth comes in  today for preop physical.  She has been struggling with some chronic neck and right arm symptoms.  She is been doing some physical therapy and is going to be having an MRI.  She had a previous lumpectomy due to breast cancer on the right side in the past.  She has some breast asymmetry.  She has had surgeries many years ago without any issues with anesthesia.  She has no known coronary artery disease and denies chest pain or shortness of breath.  She has COPD that was picked up incidentally but is completely asymptomatic.  She had very stable mild chronic kidney disease.  She takes Synthroid for postsurgical hypothyroidism.  She is getting Prolia for osteopenia.  She has had no recent illnesses or exposures.      2/13/2024     9:49 AM   Preop Questions   1. Have you ever had a heart attack or stroke? No   2. Have you ever had surgery on your heart or blood vessels, such as a stent placement, a coronary artery bypass, or surgery on an artery in your head, neck, heart, or legs? No   3. Do you have chest pain with activity? No   4. Do you have a history of  heart failure? No   5. Do you currently have a cold, bronchitis or symptoms of other infection? No   6. Do you have a cough, shortness of breath, or wheezing? No   7. Do you or anyone in your family have previous history of blood clots? No   8. Do you or does anyone in your family have a serious bleeding problem such as prolonged bleeding following surgeries or cuts? No   9. Have you ever had problems with anemia or been told to take iron pills? YES - years ago   10. Have you had any abnormal blood loss such as black, tarry or bloody stools, or abnormal vaginal bleeding? No   11. Have you ever had a blood transfusion? No   12. Are you willing to have a blood transfusion if it is medically needed before, during, or after your surgery? Yes   13. Have you or any of your relatives ever had problems with anesthesia? No   14. Do you have sleep apnea, excessive snoring or  daytime drowsiness? No   15. Do you have any artifical heart valves or other implanted medical devices like a pacemaker, defibrillator, or continuous glucose monitor? No   16. Do you have artificial joints? No   17. Are you allergic to latex? No       Health Care Directive  Patient has a Health Care Directive on file      Preoperative Review of    reviewed - no record of controlled substances prescribed.      Status of Chronic Conditions:  See problem list for active medical problems.  Problems all longstanding and stable, except as noted/documented.  See ROS for pertinent symptoms related to these conditions.    Patient Active Problem List    Diagnosis Date Noted    Numbness and tingling of right upper extremity 01/22/2024     Priority: Medium    Cervicalgia 01/22/2024     Priority: Medium    Chronic right-sided thoracic back pain 01/22/2024     Priority: Medium    Lumbar back pain 01/22/2024     Priority: Medium    Skin cancer of face 06/07/2023     Priority: Medium    Chronic kidney disease, stage 3a (H) 06/07/2023     Priority: Medium    Personal history of other drug therapy 06/07/2022     Priority: Medium    Age-related macular degeneration, dry, both eyes 05/10/2022     Priority: Medium    Emphysema/COPD (H) 04/30/2021     Priority: Medium    Age related osteoporosis 01/17/2021     Priority: Medium    Social anxiety disorder 09/08/2016     Priority: Medium    Osteopenia 09/08/2016     Priority: Medium    Hearing loss, bilateral 09/08/2016     Priority: Medium    Breast CA (H) 02/18/2016     Priority: Medium    Thyroid goiter 02/18/2016     Priority: Medium    H/O hysterectomy for benign disease 02/18/2016     Priority: Medium      Past Medical History:   Diagnosis Date    Breast cancer (H) 2001    left     Past Surgical History:   Procedure Laterality Date    BIOPSY BREAST Left 2001    HYSTERECTOMY      LUMPECTOMY BREAST Left 2001    REPAIR BLADDER       Current Outpatient Medications   Medication Sig  "Dispense Refill    ASCORBATE CALCIUM (VITAMIN C ORAL) [ASCORBATE CALCIUM (VITAMIN C ORAL)] Take by mouth.      b complex vitamins tablet [B COMPLEX VITAMINS TABLET] Take 1 tablet by mouth daily.      cyanocobalamin (VITAMIN B-12) 500 MCG tablet Take 500 mcg by mouth Three times a week      levothyroxine (SYNTHROID/LEVOTHROID) 25 MCG tablet TAKE ONE TABLET BY MOUTH ONCE DAILY 90 tablet 1    MAGNESIUM ORAL [MAGNESIUM ORAL] Take by mouth.      NEW MED PRESSURE VISION AREDS -OTC SUPPLEMENT.  1 capsules twice daily      niacinamide 500 MG tablet       OMEGA-3S/DHA/EPA/FISH OIL (OMEGA 3 ORAL) [OMEGA-3S/DHA/EPA/FISH OIL (OMEGA 3 ORAL)] Take by mouth.      zinc gluconate 50 MG tablet          Allergies   Allergen Reactions    Sulfa (Sulfonamide Antibiotics) [Sulfa Antibiotics] Unknown        Social History     Tobacco Use    Smoking status: Never    Smokeless tobacco: Never   Substance Use Topics    Alcohol use: Not on file       History   Drug Use Not on file         Review of Systems    Review of Systems  CONSTITUTIONAL: NEGATIVE for fever, chills, change in weight  INTEGUMENTARY/SKIN: NEGATIVE for worrisome rashes, moles or lesions  EYES: NEGATIVE for vision changes or irritation  ENT/MOUTH: NEGATIVE for ear, mouth and throat problems  RESP: NEGATIVE for significant cough or SOB  CV: NEGATIVE for chest pain, palpitations or peripheral edema  GI: NEGATIVE for nausea, abdominal pain, heartburn, or change in bowel habits  : NEGATIVE for frequency, dysuria, or hematuria  HEME: NEGATIVE for bleeding problems  PSYCHIATRIC: NEGATIVE for changes in mood or affect  Objective    /56   Pulse 55   Temp 97.7  F (36.5  C)   Resp 16   Ht 1.702 m (5' 7\")   Wt 63.2 kg (139 lb 6.4 oz)   LMP  (LMP Unknown)   SpO2 100%   BMI 21.83 kg/m     Estimated body mass index is 21.83 kg/m  as calculated from the following:    Height as of this encounter: 1.702 m (5' 7\").    Weight as of this encounter: 63.2 kg (139 lb 6.4 " oz).  Physical Exam  GENERAL: alert and no distress  EYES: Eyes grossly normal to inspection, PERRL and conjunctivae and sclerae normal  HENT: ear canals and TM's normal, nose and mouth without ulcers or lesions  NECK: no adenopathy, no asymmetry, masses, or scars  RESP: lungs clear to auscultation - no rales, rhonchi or wheezes  CV: regular rate and rhythm, normal S1 S2, no S3 or S4, no murmur, click or rub, no peripheral edema  ABDOMEN: soft, nontender, no hepatosplenomegaly, no masses and bowel sounds normal  MS: no gross musculoskeletal defects noted, no edema  SKIN: no suspicious lesions or rashes  NEURO: Normal strength and tone, mentation intact and speech normal  PSYCH: mentation appears normal, affect normal/bright    Recent Labs   Lab Test 12/29/23  0951 05/08/23  1029 02/16/23  1435   HGB  --   --  13.9   PLT  --   --  162   NA  --   --  138   POTASSIUM  --   --  4.8   CR 1.06* 1.08* 1.02*        Diagnostics  Recent Results (from the past 48 hour(s))   CBC with platelets    Collection Time: 02/20/24 10:16 AM   Result Value Ref Range    WBC Count 5.1 4.0 - 11.0 10e3/uL    RBC Count 4.29 3.80 - 5.20 10e6/uL    Hemoglobin 13.2 11.7 - 15.7 g/dL    Hematocrit 40.0 35.0 - 47.0 %    MCV 93 78 - 100 fL    MCH 30.8 26.5 - 33.0 pg    MCHC 33.0 31.5 - 36.5 g/dL    RDW 12.3 10.0 - 15.0 %    Platelet Count 142 (L) 150 - 450 10e3/uL   Comprehensive metabolic panel    Collection Time: 02/20/24 10:16 AM   Result Value Ref Range    Sodium 140 135 - 145 mmol/L    Potassium 4.5 3.4 - 5.3 mmol/L    Carbon Dioxide (CO2) 24 22 - 29 mmol/L    Anion Gap 10 7 - 15 mmol/L    Urea Nitrogen 14.4 8.0 - 23.0 mg/dL    Creatinine 1.07 (H) 0.51 - 0.95 mg/dL    GFR Estimate 54 (L) >60 mL/min/1.73m2    Calcium 9.4 8.8 - 10.2 mg/dL    Chloride 106 98 - 107 mmol/L    Glucose 87 70 - 99 mg/dL    Alkaline Phosphatase 63 40 - 150 U/L    AST 39 0 - 45 U/L    ALT 45 0 - 50 U/L    Protein Total 6.7 6.4 - 8.3 g/dL    Albumin 4.2 3.5 - 5.2 g/dL     Bilirubin Total 0.4 <=1.2 mg/dL   Lipid panel reflex to direct LDL Fasting    Collection Time: 02/20/24 10:16 AM   Result Value Ref Range    Cholesterol 200 (H) <200 mg/dL    Triglycerides 96 <150 mg/dL    Direct Measure HDL 61 >=50 mg/dL    LDL Cholesterol Calculated 120 (H) <=100 mg/dL    Non HDL Cholesterol 139 (H) <130 mg/dL    Patient Fasting > 8hrs? No       No EKG required, no history of coronary heart disease, significant arrhythmia, peripheral arterial disease or other structural heart disease.    Revised Cardiac Risk Index (RCRI)  The patient has the following serious cardiovascular risks for perioperative complications:   - No serious cardiac risks = 0 points     RCRI Interpretation: 0 points: Class I (very low risk - 0.4% complication rate)         Signed Electronically by: Nasra Houser MD  Copy of this evaluation report is provided to requesting physician.

## 2024-02-25 ENCOUNTER — HOSPITAL ENCOUNTER (OUTPATIENT)
Dept: MRI IMAGING | Facility: HOSPITAL | Age: 79
Discharge: HOME | End: 2024-02-25
Attending: FAMILY MEDICINE | Admitting: FAMILY MEDICINE
Payer: COMMERCIAL

## 2024-02-25 DIAGNOSIS — R20.2 NUMBNESS AND TINGLING OF RIGHT ARM: ICD-10-CM

## 2024-02-25 DIAGNOSIS — M54.9 UPPER BACK PAIN: ICD-10-CM

## 2024-02-25 DIAGNOSIS — R20.0 NUMBNESS AND TINGLING OF RIGHT ARM: ICD-10-CM

## 2024-02-25 PROCEDURE — 72141 MRI NECK SPINE W/O DYE: CPT

## 2024-02-26 ENCOUNTER — OFFICE VISIT (OUTPATIENT)
Dept: FAMILY MEDICINE | Facility: CLINIC | Age: 79
End: 2024-02-26
Attending: FAMILY MEDICINE
Payer: COMMERCIAL

## 2024-02-26 VITALS
RESPIRATION RATE: 16 BRPM | DIASTOLIC BLOOD PRESSURE: 59 MMHG | SYSTOLIC BLOOD PRESSURE: 142 MMHG | OXYGEN SATURATION: 97 % | BODY MASS INDEX: 22.16 KG/M2 | HEART RATE: 64 BPM | TEMPERATURE: 97.5 F | HEIGHT: 67 IN | WEIGHT: 141.2 LBS

## 2024-02-26 DIAGNOSIS — N18.31 CHRONIC KIDNEY DISEASE, STAGE 3A (H): ICD-10-CM

## 2024-02-26 DIAGNOSIS — R20.2 NUMBNESS AND TINGLING OF RIGHT ARM: ICD-10-CM

## 2024-02-26 DIAGNOSIS — R93.89 ABNORMAL MRI, NECK: ICD-10-CM

## 2024-02-26 DIAGNOSIS — M48.02 NEURAL FORAMINAL STENOSIS OF CERVICAL SPINE: ICD-10-CM

## 2024-02-26 DIAGNOSIS — I65.02 VERTEBRAL ARTERY OCCLUSION, LEFT: ICD-10-CM

## 2024-02-26 DIAGNOSIS — Z29.11 NEED FOR VACCINATION AGAINST RESPIRATORY SYNCYTIAL VIRUS: ICD-10-CM

## 2024-02-26 DIAGNOSIS — Z00.00 ENCOUNTER FOR MEDICARE ANNUAL WELLNESS EXAM: Primary | ICD-10-CM

## 2024-02-26 DIAGNOSIS — J43.9 PULMONARY EMPHYSEMA, UNSPECIFIED EMPHYSEMA TYPE (H): ICD-10-CM

## 2024-02-26 DIAGNOSIS — R20.0 NUMBNESS AND TINGLING OF RIGHT ARM: ICD-10-CM

## 2024-02-26 PROCEDURE — 99213 OFFICE O/P EST LOW 20 MIN: CPT | Mod: 25 | Performed by: FAMILY MEDICINE

## 2024-02-26 PROCEDURE — G0439 PPPS, SUBSEQ VISIT: HCPCS | Performed by: FAMILY MEDICINE

## 2024-02-26 RX ORDER — RESPIRATORY SYNCYTIAL VIRUS VACCINE 120MCG/0.5
0.5 KIT INTRAMUSCULAR ONCE
Qty: 1 EACH | Refills: 0 | Status: CANCELLED | OUTPATIENT
Start: 2024-02-26 | End: 2024-02-26

## 2024-02-26 NOTE — PROGRESS NOTES
Preventive Care Visit  [facility]  Hugo Clarke MD, Family Medicine  Feb 26, 2024    Assessment & Plan     Encounter for Medicare annual wellness exam    - PRIMARY CARE FOLLOW-UP SCHEDULING    Numbness and tingling of right arm    Recent MRI of the cervical spine revealed foraminal stenosis though was mild and moderate.    Consider possible carpal tunnel syndrome versus shoulder issue causing impingement    Recommend further evaluation with orthopedics with the hand and shoulder specialist  She may benefit from an electromyelogram/EMG for further evaluation  Referral given    - Orthopedic  Referral; Future    Neural foraminal stenosis of cervical spine    MRI of the cervical spine reviewed  She will be following up with orthopedics in the short-term  Consider referral to the spine care clinic to review conservative treatment options including physical therapy    Chronic kidney disease, stage 3a (H)    Recommend good control of blood pressure  Recommend avoiding nephrotoxic substances  Encourage plenty of water intake  Favor checking a microalbumin level in the future  Consider an ACE inhibitor      Need for vaccination against respiratory syncytial virus  Consider the RSV vaccine at her pharmacy    Pulmonary emphysema, unspecified emphysema type (H)  Continue to monitor  Can consider pulmonary function testing and pulmonary referral if needed    Abnormal MRI, neck  Vertebral artery occlusion, left    Reviewed her recent MRI of the cervical spine showing loss of normal left vertebral flow void. May represent an occlusion    Recommend further evaluation with a CTA of the head and neck and follow-up with vascular medicine                                                                     IMPRESSION:  1.  Loss of the normal left vertebral artery flow void, suggesting age-indeterminate occlusion. CT angiogram recommended for further characterization.  2.  Unremarkable  cervical cord.  3.  No high-grade spinal canal stenosis.  4.  At C3-C4, there is moderate left neural foraminal stenosis.  5.  At C6-C7, there is mild to moderate left neural foraminal stenosis.  6.  Additional degenerative changes as above.          Osteopenia    Follow-up with Cora Bourne, endocrinology        - CTA Head Neck with Contrast; Future  - Vascular Medicine Referral; Future        Identified Health Risks:  I have reviewed Opioid Use Disorder and Substance Use Disorder risk factors and made any needed referrals.    No opioid prescriptions      Patient has been advised of split billing requirements and indicates understanding: Yes  Review of external notes as documented elsewhere in note        Counseling  Appropriate preventive services were discussed with this patient, including applicable screening as appropriate for fall prevention, nutrition, physical activity, Tobacco-use cessation, weight loss and cognition.  Checklist reviewing preventive services available has been given to the patient.  Reviewed patient's diet, addressing concerns and/or questions.   Discussed possible causes of fatigue. Patient reported safety concerns were addressed today.The patient was provided with written information regarding signs of hearing loss.   Information on urinary incontinence and treatment options given to patient.           Dino Miranda is a 78 year old, presenting for the following:  Wellness Visit        2/26/2024     2:04 PM   Additional Questions   Roomed by Tonja TURCIOS CMA         Via the Health Maintenance questionnaire, the patient has reported the following services have been completed -Mammogram, this information has been sent to the abstraction team.  Health Care Directive  Patient has a Health Care Directive on file  Advance care planning document is on file and is current.    JARRELL Miranda presents to the clinic for a wellness visit.  She presents also for follow-up of right-sided neck and upper  back pain as well as numbness and tingling in her right arm.  This was reviewed at the last visit with this provider.  At that time she reported numbness and tingling in her right arm and hand which has been present over the past several years.  She states that she often will have numbness that is worse at night.  She is not sure if she sleeps in an unusual position.  She denies obvious neck injury.  She does have some shoulder pain at times.  She also has had some discomfort in the right upper back area which has been chronic.  She has not had focal weakness, slurred speech, or confusion.  Given concern for a cervical adenopathy she was referred for an MRI of the cervical spine which revealed foraminal stenosis.  There was an abnormality noted in the flow through the left vertebral artery and therefore follow-up has been recommended regarding that.    More recently, she has been considering breast reduction surgery though is contemplating holding on that.    Medical history is notable for emphysema as well as osteopenia.  She continues to be treated with levothyroxine and follows up with Cora Bourne regarding that.     She has a history of breast cancer with previous left breast lumpectomy.     She had a negative Cologuard test in January of this year.            2/25/2024   General Health   How would you rate your overall physical health? Good   Feel stress (tense, anxious, or unable to sleep) Not at all         2/25/2024   Nutrition   Diet: Regular (no restrictions)         2/25/2024   Exercise   Days per week of moderate/strenous exercise 4 days         2/25/2024   Social Factors   Frequency of gathering with friends or relatives Once a week   Worry food won't last until get money to buy more No   Food not last or not have enough money for food? No   Do you have housing?  Yes   Are you worried about losing your housing? No   Lack of transportation? No   Unable to get utilities (heat,electricity)? No          2/25/2024   Fall Risk   Fallen 2 or more times in the past year? No   Trouble with walking or balance? No          2/25/2024   Activities of Daily Living- Home Safety   Needs help with the following daily activites None of the above   Safety concerns in the home Throw rugs in the hallway    No grab bars in the bathroom         2/25/2024   Dental   Dentist two times every year? Yes         2/25/2024   Hearing Screening   Hearing concerns? (!) IT'S HARD TO FOLLOW A CONVERSATION IN A NOISY RESTAURANT OR CROWDED ROOM.    (!) TROUBLE FOLLOWING DIALOGUE IN THE THEATHER.    (!) TROUBLE UNDERSTANDING SOFT OR WHISPERED SPEECH.         2/25/2024   Driving Risk Screening   Patient/family members have concerns about driving No         2/25/2024   General Alertness/Fatigue Screening   Have you been more tired than usual lately? (!) YES         2/25/2024   Urinary Incontinence Screening   Bothered by leaking urine in past 6 months Yes         2/25/2024   TB Screening   Were you born outside of [place]?  No         Today's PHQ-2 Score:       2/25/2024     5:51 PM   PHQ-2 ( 1999 [employer])   Q1: Little interest or pleasure in doing things 0   Q2: Feeling down, depressed or hopeless 0   PHQ-2 Score 0   Q1: Little interest or pleasure in doing things Not at all   Q2: Feeling down, depressed or hopeless Not at all   PHQ-2 Score 0           2/25/2024   Substance Use   Alcohol more than 3/day or more than 7/wk No   Do you have a current opioid prescription? No   How severe/bad is pain from 1 to 10? 7/10   Do you use any other substances recreationally? No     Social History     Tobacco Use    Smoking status: Never    Smokeless tobacco: Never   Vaping Use    Vaping Use: Never used            No data to display                 Mammogram Screening - After age 74- determine frequency with patient based on health status, life expectancy and patient goals    ASCVD Risk   The 10-year ASCVD risk score (Teena PICKERING, et al., 2019) is: 21.5%     Values used to calculate the score:      Age: 78 years      Sex: Female      Is Non- : No      Diabetic: No      Tobacco smoker: No      Systolic Blood Pressure: 142 mmHg      Is BP treated: No      HDL Cholesterol: 61 mg/dL      Total Cholesterol: 200 mg/dL            Reviewed and updated as needed this visit by Provider                    Past Medical History:   Diagnosis Date    Breast cancer (H) 2001    left     Past Surgical History:   Procedure Laterality Date    BIOPSY BREAST Left 2001    HYSTERECTOMY      LUMPECTOMY BREAST Left 2001    REPAIR BLADDER       Current providers sharing in care for this patient include:  Patient Care Team:  Hugo Clarke MD as PCP - General (Family Medicine)  Nasra Houser MD as Assigned PCP  Cora Bourne NP as Nurse Practitioner  Corinne Cowan AuD as Audiologist (Audiology)    The following health maintenance items are reviewed in Epic and correct as of today:  Health Maintenance   Topic Date Due    MICROALBUMIN  Never done    SPIROMETRY  Never done    COPD ACTION PLAN  Never done    RSV VACCINE (Pregnancy & 60+) (1 - 1-dose 60+ series) Never done    ANNUAL REVIEW OF HM ORDERS  02/15/2023    MEDICARE ANNUAL WELLNESS VISIT  02/16/2024    TSH W/FREE T4 REFLEX  02/16/2024    MAMMO SCREENING  02/28/2024    BMP  02/20/2025    LIPID  02/20/2025    HEMOGLOBIN  02/20/2025    FALL RISK ASSESSMENT  02/26/2025    ADVANCE CARE PLANNING  02/15/2027    GLUCOSE  02/20/2027    DTAP/TDAP/TD IMMUNIZATION (3 - Td or Tdap) 09/18/2030    DEXA  08/10/2038    PHQ-2 (once per calendar year)  Completed    INFLUENZA VACCINE  Completed    Pneumococcal Vaccine: 65+ Years  Completed    URINALYSIS  Completed    ZOSTER IMMUNIZATION  Completed    COVID-19 Vaccine  Completed    IPV IMMUNIZATION  Aged Out    HPV IMMUNIZATION  Aged Out    MENINGITIS IMMUNIZATION  Aged Out    RSV MONOCLONAL ANTIBODY  Aged Out    HEPATITIS C SCREENING  Discontinued    COLORECTAL CANCER SCREENING   "Discontinued         Review of Systems  Constitutional, HEENT, cardiovascular, pulmonary, gi and gu systems are negative, except as otherwise noted.     Objective    Exam  BP (!) 142/59 (BP Location: Left arm, Patient Position: Sitting, Cuff Size: Adult Regular)   Pulse 64   Temp 97.5  F (36.4  C) (Oral)   Resp 16   Ht 1.702 m (5' 7\")   Wt 64 kg (141 lb 3.2 oz)   LMP  (LMP Unknown)   SpO2 97%   BMI 22.12 kg/m     Estimated body mass index is 22.12 kg/m  as calculated from the following:    Height as of this encounter: 1.702 m (5' 7\").    Weight as of this encounter: 64 kg (141 lb 3.2 oz).    Physical Exam  GENERAL: alert and no distress  EYES: Eyes grossly normal to inspection, PERRL and conjunctivae and sclerae normal  HENT: ear canals and TM's normal, nose and mouth without ulcers or lesions  NECK: no adenopathy, no asymmetry, masses, or scars  RESP: lungs clear to auscultation - no rales, rhonchi or wheezes  CV: regular rate and rhythm, normal S1 S2, no S3 or S4, no murmur, click or rub, no peripheral edema  MS: no gross musculoskeletal defects noted, no edema  SKIN: no suspicious lesions or rashes  NEURO: Normal strength and tone, mentation intact and speech normal  PSYCH: mentation appears normal, affect normal/bright         2/26/2024   Mini Cog   Clock Draw Score 2 Normal   3 Item Recall 3 objects recalled   Mini Cog Total Score 5            Signed Electronically by: Hugo Clarke MD    "

## 2024-02-26 NOTE — PATIENT INSTRUCTIONS
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 Zavala as experienced breast surgeon if you wanted to see her to consider breast reduction surgery in the future    Hugo Clarke MD    Preventive Care Advice   This is general advice given by our system to help you stay healthy. However, your care team may have specific advice just for you. Please talk to your care team about your preventive care needs.  Nutrition  Eat 5 or more servings of fruits and vegetables each day.  Try wheat bread, brown rice and whole grain pasta (instead of white bread, rice, and pasta).  Get enough calcium and vitamin D. Check the label on foods and aim for 100% of the RDA (recommended daily allowance).  Lifestyle  Exercise at least 150 minutes each week   (30 minutes a day, 5 days a week).  Do muscle strengthening activities 2 days a week. These help control your weight and prevent disease.  No smoking.  Wear sunscreen to prevent skin cancer.  Have a dental exam and cleaning every 6 months.  Yearly exams  See your health care team every year to talk about:  Any changes in your health.  Any medicines your care team has prescribed.  Preventive care, family planning, and ways to prevent chronic  diseases.  Shots (vaccines)   HPV shots (up to age 26), if you've never had them before.  Hepatitis B shots (up to age 59), if you've never had them before.  COVID-19 shot: Get this shot when it's due.  Flu shot: Get a flu shot every year.  Tetanus shot: Get a tetanus shot every 10 years.  Pneumococcal, hepatitis A, and RSV shots: Ask your care team if you need these based on your risk.  Shingles shot (for age 50 and up).  General health tests  Diabetes screening:  Starting at age 35, Get screened for diabetes at least every 3 years.  If you are younger than age 35, ask your care team if you should be screened for diabetes.  Cholesterol test: At age 39, start having a cholesterol test every 5 years, or more often if advised.  Bone density scan (DEXA): At age 50, ask your care team if you should have this scan for osteoporosis (brittle bones).  Hepatitis C: Get tested at least once in your life.  STIs (sexually transmitted infections)  Before age 24: Ask your care team if you should be screened for STIs.  After age 24: Get screened for STIs if you're at risk. You are at risk for STIs (including HIV) if:  You are sexually active with more than one person.  You don't use condoms every time.  You or a partner was diagnosed with a sexually transmitted infection.  If you are at risk for HIV, ask about PrEP medicine to prevent HIV.  Get tested for HIV at least once in your life, whether you are at risk for HIV or not.  Cancer screening tests  Cervical cancer screening: If you have a cervix, begin getting regular cervical cancer screening tests at age 21. Most people who have regular screenings with normal results can stop after age 65. Talk about this with your provider.  Breast cancer scan (mammogram): If you've ever had breasts, begin having regular mammograms starting at age 40. This is a scan to check for breast cancer.  Colon cancer screening: It is important to start screening for colon cancer at age 45.  Have a  colonoscopy test every 10 years (or more often if you're at risk) Or, ask your provider about stool tests like a FIT test every year or Cologuard test every 3 years.  To learn more about your testing options, visit: [email or web address]/[email or web address].  For help making a decision, visit: https://bit.ly/ro45318.  Prostate cancer screening test: If you have a prostate and are age 55 to 69, ask your provider if you would benefit from a yearly prostate cancer screening test.  Lung cancer screening: If you are a current or former smoker age 50 to 80, ask your care team if ongoing lung cancer screenings are right for you.  For informational purposes only. Not to replace the advice of your health care provider. Copyright   2023 [facility] [employer]. All rights reserved. Clinically reviewed by the [facility]. [employer] 371890 - REV 01/24.    Learning About Activities of Daily Living  What are activities of daily living?     Activities of daily living (ADLs) are the basic self-care tasks you do every day. As you age, and if you have health problems, you may find that it's harder to do these things for yourself. That's when you may need some help.  Your doctor uses ADLs to measure how much help you need. Knowing what you can and can't do for yourself is an important first step to getting help. And when you have the help you need, you can stay as independent as possible.  Your doctor will want to know if you are able to do tasks such as:  Take a bath or shower without help.  Go to the bathroom by yourself.  Dress and undress without help.  Shave, comb your hair, and brush teeth on your own.  Get in and out of bed or a chair without help.  Feed yourself without help.  If you are having trouble doing basic self-care tasks, talk with your doctor. You may want to bring a caregiver or family member who can help the doctor understand your needs and abilities.  How will a doctor assess your  ADLs?  Asking about ADLs is part of a routine health checkup your doctor will likely do as you age. Your health check might be done in a doctor's office, in your home, or at a hospital. The goal is to find out if you are having any problems that could make your health problems worse or that make it unsafe for you to be on your own.  To measure your ADLs, your doctor will ask how hard it is for you to do routine tasks. He or she may also want to know if you have changed the way you do a task because of a health problem. He or she may watch how you:  Walk back and forth.  Keep your balance while you stand or walk.  Move from sitting to standing or from a bed to a chair.  Button or unbutton a shirt or sweater.  Remove and put on your shoes.  It's normal to feel a little worried or anxious if you find you can't do all the things you used to be able to do. Talking with your doctor about ADLs isn't a test that you either pass or fail. It's just a way to get more information about your health and safety.  Follow-up care is a key part of your treatment and safety. Be sure to make and go to all appointments, and call your doctor if you are having problems. It's also a good idea to know your test results and keep a list of the medicines you take.  Current as of: February 26, 2023               Content Version: 13.8    5040-3782 [employer].   Care instructions adapted under license by your healthcare professional. If you have questions about a medical condition or this instruction, always ask your healthcare professional. [employer] disclaims any warranty or liability for your use of this information.      Hearing Loss: Care Instructions  Overview     Hearing loss is a sudden or slow decrease in how well you hear. It can range from slight to profound. Permanent hearing loss can occur with aging. It also can happen when you are exposed long-term to loud noise. Examples include listening to loud  music, riding motorcycles, or being around other loud machines.  Hearing loss can affect your work and home life. It can make you feel lonely or depressed. You may feel that you have lost your independence. But hearing aids and other devices can help you hear better and feel connected to others.  Follow-up care is a key part of your treatment and safety. Be sure to make and go to all appointments, and call your doctor if you are having problems. It's also a good idea to know your test results and keep a list of the medicines you take.  How can you care for yourself at home?  Avoid loud noises whenever possible. This helps keep your hearing from getting worse.  Always wear hearing protection around loud noises.  Wear a hearing aid as directed.  A professional can help you pick a hearing aid that will work best for you.  You can also get hearing aids over the counter for mild to moderate hearing loss.  Have hearing tests as your doctor suggests. They can show whether your hearing has changed. Your hearing aid may need to be adjusted.  Use other devices as needed. These may include:  Telephone amplifiers and hearing aids that can connect to a television, stereo, radio, or microphone.  Devices that use lights or vibrations. These alert you to the doorbell, a ringing telephone, or a baby monitor.  Television closed-captioning. This shows the words at the bottom of the screen. Most new TVs can do this.  TTY (text telephone). This lets you type messages back and forth on the telephone instead of talking or listening. These devices are also called TDD. When messages are typed on the keyboard, they are sent over the phone line to a receiving TTY. The message is shown on a monitor.  Use text messaging, social media, and email if it is hard for you to communicate by telephone.  Try to learn a listening technique called speechreading. It is not lipreading. You pay attention to people's gestures, expressions, posture, and tone  "of voice. These clues can help you understand what a person is saying. Face the person you are talking to, and have them face you. Make sure the lighting is good. You need to see the other person's face clearly.  Think about counseling if you need help to adjust to your hearing loss.  When should you call for help?  Watch closely for changes in your health, and be sure to contact your doctor if:    You think your hearing is getting worse.     You have new symptoms, such as dizziness or nausea.   Where can you learn more?  Go to https://[email or web address]/patiented  Enter R798 in the search box to learn more about \"Hearing Loss: Care Instructions.\"  Current as of: February 28, 2023               Content Version: 13.8    1393-8930 [employer].   Care instructions adapted under license by your healthcare professional. If you have questions about a medical condition or this instruction, always ask your healthcare professional. [employer] disclaims any warranty or liability for your use of this information.      Learning About Sleeping Well  What does sleeping well mean?     Sleeping well means getting enough sleep to feel good and stay healthy. How much sleep is enough varies among people.  The number of hours you sleep and how you feel when you wake up are both important. If you do not feel refreshed, you probably need more sleep. Another sign of not getting enough sleep is feeling tired during the day.  Experts recommend that adults get at least 7 or more hours of sleep per day. Children and older adults need more sleep.  Why is getting enough sleep important?  Getting enough quality sleep is a basic part of good health. When your sleep suffers, your physical health, mood, and your thoughts can suffer too. You may find yourself feeling more grumpy or stressed. Not getting enough sleep also can lead to serious problems, including injury, accidents, anxiety, and depression.  What might cause " "poor sleeping?  Many things can cause sleep problems, including:  Changes to your sleep schedule.  Stress. Stress can be caused by fear about a single event, such as giving a speech. Or you may have ongoing stress, such as worry about work or school.  Depression, anxiety, and other mental or emotional conditions.  Changes in your sleep habits or surroundings. This includes changes that happen where you sleep, such as noise, light, or sleeping in a different bed. It also includes changes in your sleep pattern, such as having jet lag or working a late shift.  Health problems, such as pain, breathing problems, and restless legs syndrome.  Lack of regular exercise.  Using alcohol, nicotine, or caffeine before bed.  How can you help yourself?  Here are some tips that may help you sleep more soundly and wake up feeling more refreshed.  Your sleeping area   Use your bedroom only for sleeping and sex. A bit of light reading may help you fall asleep. But if it doesn't, do your reading elsewhere in the house. Try not to use your TV, computer, smartphone, or tablet while you are in bed.  Be sure your bed is big enough to stretch out comfortably, especially if you have a sleep partner.  Keep your bedroom quiet, dark, and cool. Use curtains, blinds, or a sleep mask to block out light. To block out noise, use earplugs, soothing music, or a \"white noise\" machine.  Your evening and bedtime routine   Create a relaxing bedtime routine. You might want to take a warm shower or bath, or listen to soothing music.  Go to bed at the same time every night. And get up at the same time every morning, even if you feel tired.  What to avoid   Limit caffeine (coffee, tea, caffeinated sodas) during the day, and don't have any for at least 6 hours before bedtime.  Avoid drinking alcohol before bedtime. Alcohol can cause you to wake up more often during the night.  Try not to smoke or use tobacco, especially in the evening. Nicotine can keep you " "awake.  Limit naps during the day, especially close to bedtime.  Avoid lying in bed awake for too long. If you can't fall asleep or if you wake up in the middle of the night and can't get back to sleep within about 20 minutes, get out of bed and go to another room until you feel sleepy.  Avoid taking medicine right before bed that may keep you awake or make you feel hyper or energized. Your doctor can tell you if your medicine may do this and if you can take it earlier in the day.  If you can't sleep   Imagine yourself in a peaceful, pleasant scene. Focus on the details and feelings of being in a place that is relaxing.  Get up and do a quiet or boring activity until you feel sleepy.  Avoid drinking any liquids before going to bed to help prevent waking up often to use the bathroom.  Where can you learn more?  Go to https://www.[employer].LiveTop/patiented  Enter J942 in the search box to learn more about \"Learning About Sleeping Well.\"  Current as of: July 11, 2023               Content Version: 13.8    4939-4668 [employer].   Care instructions adapted under license by your healthcare professional. If you have questions about a medical condition or this instruction, always ask your healthcare professional. [employer] disclaims any warranty or liability for your use of this information.      Bladder Training: Care Instructions  Your Care Instructions     Bladder training is used to treat urge incontinence and stress incontinence. Urge incontinence means that the need to urinate comes on so fast that you can't get to a toilet in time. Stress incontinence means that you leak urine because of pressure on your bladder. For example, it may happen when you laugh, cough, or lift something heavy.  Bladder training can increase how long you can wait before you have to urinate. It can also help your bladder hold more urine. And it can give you better control over the urge to urinate.  It is important " to remember that bladder training takes a few weeks to a few months to make a difference. You may not see results right away, but don't give up.  Follow-up care is a key part of your treatment and safety. Be sure to make and go to all appointments, and call your doctor if you are having problems. It's also a good idea to know your test results and keep a list of the medicines you take.  How can you care for yourself at home?  Work with your doctor to come up with a bladder training program that is right for you. You may use one or more of the following methods.  Delayed urination  In the beginning, try to keep from urinating for 5 minutes after you first feel the need to go.  While you wait, take deep, slow breaths to relax. Kegel exercises can also help you delay the need to go to the bathroom.  After some practice, when you can easily wait 5 minutes to urinate, try to wait 10 minutes before you urinate.  Slowly increase the waiting period until you are able to control when you have to urinate.  Scheduled urination  Empty your bladder when you first wake up in the morning.  Schedule times throughout the day when you will urinate.  Start by going to the bathroom every hour, even if you don't need to go.  Slowly increase the time between trips to the bathroom.  When you have found a schedule that works well for you, keep doing it.  If you wake up during the night and have to urinate, do it. Apply your schedule to waking hours only.  Kegel exercises  These tighten and strengthen pelvic muscles, which can help you control the flow of urine. (If doing these exercises causes pain, stop doing them and talk with your doctor.) To do Kegel exercises:  Squeeze your muscles as if you were trying not to pass gas. Or squeeze your muscles as if you were stopping the flow of urine. Your belly, legs, and buttocks shouldn't move.  Hold the squeeze for 3 seconds, then relax for 5 to 10 seconds.  Start with 3 seconds, then add 1  "second each week until you are able to squeeze for 10 seconds.  Repeat the exercise 10 times a session. Do 3 to 8 sessions a day.  When should you call for help?  Watch closely for changes in your health, and be sure to contact your doctor if:    Your incontinence is getting worse.     You do not get better as expected.   Where can you learn more?  Go to [email or web address]://[email or web address]/patiented  Enter V684 in the search box to learn more about \"Bladder Training: Care Instructions.\"  Current as of: February 28, 2023               Content Version: 13.8    7120-9946 [employer].   Care instructions adapted under license by your healthcare professional. If you have questions about a medical condition or this instruction, always ask your healthcare professional. [employer] disclaims any warranty or liability for your use of this information.      "

## 2024-02-27 ENCOUNTER — MYC MEDICAL ADVICE (OUTPATIENT)
Dept: FAMILY MEDICINE | Facility: CLINIC | Age: 79
End: 2024-02-27
Payer: COMMERCIAL

## 2024-02-29 ENCOUNTER — TELEPHONE (OUTPATIENT)
Dept: OTHER | Age: 79
End: 2024-02-29

## 2024-02-29 NOTE — TELEPHONE ENCOUNTER
Pt calling back to give us the fax number to [facility] [place], [phone]. Per [facility]- they still do not have the referral. Please refax and all pt back once this is done.

## 2024-02-29 NOTE — TELEPHONE ENCOUNTER
Relayed the following, and gave patient the number for the  [phone]. This was not the same number she had called to schedule with [facility] when she hadn't heard from them. She will update us if this also fails.

## 2024-02-29 NOTE — TELEPHONE ENCOUNTER
Hello,    I'm with ortho con.  Pt of Dr. Clarke is asking for her referral to [facility] to be sent over to [facility].  She called and they do not have this.  Pt is aslo asking if she should still see [facility] as an appointment with neurology at [facility].  Please reply to the pt.

## 2024-02-29 NOTE — TELEPHONE ENCOUNTER
I entered a referral to [facility] for days ago.  This may just need to be forwarded to them.  Given that she has ongoing numbness and tingling I discussed a referral to orthopedics so she can have further evaluation which may include electromyelogram/EMG test to evaluate for the cause of her symptoms (such a carpal tunnel syndrome).  In the interim she had decided to follow-up with [facility].  She has the option of choosing where she would like to follow-up. If preferred she can wait to see neurology though I do not know how far booked out they are.

## 2024-03-04 ENCOUNTER — ANCILLARY PROCEDURE (OUTPATIENT)
Dept: MAMMOGRAPHY | Facility: CLINIC | Age: 79
End: 2024-03-04
Attending: FAMILY MEDICINE
Payer: COMMERCIAL

## 2024-03-04 DIAGNOSIS — Z12.31 VISIT FOR SCREENING MAMMOGRAM: ICD-10-CM

## 2024-03-04 PROCEDURE — 77063 BREAST TOMOSYNTHESIS BI: CPT

## 2024-06-20 DIAGNOSIS — M81.0 SENILE OSTEOPOROSIS: Primary | ICD-10-CM

## 2024-06-20 DIAGNOSIS — Z92.29 PERSONAL HISTORY OF OTHER DRUG THERAPY: ICD-10-CM

## 2024-07-03 ENCOUNTER — LAB (OUTPATIENT)
Dept: LAB | Facility: CLINIC | Age: 79
End: 2024-07-03
Payer: COMMERCIAL

## 2024-07-03 DIAGNOSIS — M81.0 SENILE OSTEOPOROSIS: ICD-10-CM

## 2024-07-03 DIAGNOSIS — E03.9 HYPOTHYROIDISM: ICD-10-CM

## 2024-07-03 DIAGNOSIS — Z92.29 PERSONAL HISTORY OF OTHER DRUG THERAPY: ICD-10-CM

## 2024-07-03 LAB
CALCIUM SERPL-MCNC: 9.1 MG/DL (ref 8.8–10.2)
CREAT SERPL-MCNC: 1.02 MG/DL (ref 0.51–0.95)
EGFRCR SERPLBLD CKD-EPI 2021: 57 ML/MIN/1.73M2
T4 FREE SERPL-MCNC: 1.17 NG/DL (ref 0.9–1.7)
TSH SERPL DL<=0.005 MIU/L-ACNC: 2.16 UIU/ML (ref 0.3–4.2)
VIT D+METAB SERPL-MCNC: 62 NG/ML (ref 20–50)

## 2024-07-03 PROCEDURE — 82565 ASSAY OF CREATININE: CPT

## 2024-07-03 PROCEDURE — 36415 COLL VENOUS BLD VENIPUNCTURE: CPT

## 2024-07-03 PROCEDURE — 82310 ASSAY OF CALCIUM: CPT

## 2024-07-03 PROCEDURE — 84439 ASSAY OF FREE THYROXINE: CPT

## 2024-07-03 PROCEDURE — 84443 ASSAY THYROID STIM HORMONE: CPT

## 2024-07-03 PROCEDURE — 82306 VITAMIN D 25 HYDROXY: CPT

## 2024-07-10 ENCOUNTER — ALLIED HEALTH/NURSE VISIT (OUTPATIENT)
Dept: ENDOCRINOLOGY | Facility: CLINIC | Age: 79
End: 2024-07-10
Payer: COMMERCIAL

## 2024-07-10 ENCOUNTER — OFFICE VISIT (OUTPATIENT)
Dept: ENDOCRINOLOGY | Facility: CLINIC | Age: 79
End: 2024-07-10
Payer: COMMERCIAL

## 2024-07-10 VITALS
DIASTOLIC BLOOD PRESSURE: 58 MMHG | BODY MASS INDEX: 21.83 KG/M2 | SYSTOLIC BLOOD PRESSURE: 132 MMHG | HEART RATE: 58 BPM | WEIGHT: 139.4 LBS | OXYGEN SATURATION: 98 %

## 2024-07-10 DIAGNOSIS — Z92.29 PERSONAL HISTORY OF OTHER DRUG THERAPY: ICD-10-CM

## 2024-07-10 DIAGNOSIS — E04.9 THYROID GOITER: ICD-10-CM

## 2024-07-10 DIAGNOSIS — M81.0 AGE-RELATED OSTEOPOROSIS WITHOUT CURRENT PATHOLOGICAL FRACTURE: Primary | ICD-10-CM

## 2024-07-10 DIAGNOSIS — M81.0 SENILE OSTEOPOROSIS: Primary | ICD-10-CM

## 2024-07-10 PROCEDURE — 96372 THER/PROPH/DIAG INJ SC/IM: CPT | Performed by: NURSE PRACTITIONER

## 2024-07-10 PROCEDURE — 99214 OFFICE O/P EST MOD 30 MIN: CPT | Performed by: NURSE PRACTITIONER

## 2024-07-10 PROCEDURE — 99207 PR NO CHARGE NURSE ONLY: CPT | Performed by: NURSE PRACTITIONER

## 2024-07-10 RX ORDER — FINASTERIDE 5 MG/1
5 TABLET, FILM COATED ORAL DAILY
COMMUNITY

## 2024-07-10 NOTE — PROGRESS NOTES
Clinic Administered Medication Documentation      Prolia Documentation    Indication: Prolia  (denosumab) is a prescription medicine used to treat osteoporosis in patients who:   Are at high risk for fracture, meaning patients who have had a fracture related to osteoporosis, or who have multiple risk factors for fracture.  Cannot use another osteoporosis medicine or other osteoporosis medicines did not work well.  The timeline for early/late injections would be 4 weeks early and any time after the 6 month alayna. If a patient receives their injection late, then the subsequent injection would be 6 months from the date that they actually received the injection.    When was the last injection?  1/3/24  Was the last injection at least 6 months ago? Yes  Has the prior authorization been completed?  Yes  Is there an active order (written within the past 365 days, with administrations remaining, not ) in the chart?  Yes   GFR Estimate   Date Value Ref Range Status   2024 57 (L) >60 mL/min/1.73m2 Final     Comment:     eGFR calculated using  CKD-EPI equation.   2021 46 (L) >60 mL/min/1.73m2 Final     Has patient had a GFR within the last 12 months? Yes   Is GFR under 30, or patient has a diagnosis of CKD4 or CKD5? No   Patient denies gastric bypass or parathyroid surgery in past 6 months? Yes - patient denies.   Patient denies dental work in the past two months involving drilling into the bone, such as implants/extractions, oral surgery or a tooth extraction that has not healed yet?  Yes  Patient denies plans for an emergency tooth extraction within the next week? Yes    The following steps were completed to comply with the REMS program for Prolia:  Reviewed information in the Medication Guide, including the serious risks of Prolia  and the symptoms of each risk.  Advised patient to seek prompt medical attention if they have signs or symptoms of any of the serious risks.  Provided each patient a copy of  the Medication Guide and Patient Guide.    Prior to injection, verified patient identity using patient's name and date of birth. Medication was administered. Please see MAR and medication order for additional information. Patient instructed to remain in clinic for 15 minutes and report any adverse reaction to staff immediately.    Vial/Syringe: Syringe  Was this medication supplied by the patient? No  Verified that the patient has refills remaining in their prescription.

## 2024-07-10 NOTE — LETTER
7/10/2024      Ruth Pinon  [address] [facility] [address] [place] [id number]      Dear Colleague,    Thank you for referring your patient, Ruth Pinon, to the [facility]. Please see a copy of my visit note below.    [facility]  ENDOCRINOLOGY    Osteoporosis Follow Up 7/11/2024    Ruth Pinon, 1945, [id number]          Reason for visit      1. Age-related osteoporosis without current pathological fracture    2. Thyroid goiter    3. Personal history of other drug therapy        History     Ruth Pinon is a very pleasant 78 year old old female who presents for follow up.   SUMMARY:  Ruth is contacted today via Video visit to establish care for Osteoporosis and Postablative Hypothyroidism. She has a family hx of Osteo in her mother. She was a smoker, but quit some time ago. She has used Bisphosphonates in the past, but is not on anything currently. She has the unfortunate hx of Breast Cancer with Aromatase inhibitor and Radiation treatment. She has had a ISAIAH about the same time that she would be experiencing Menopause. Her most recent Dexa scan showed that she had lost BMD in both hips, and that her L forearm density is quite low at -3.7.  She is interested in starting Prolia.      TODAY:    Ruth is contacted today via Video Visit in f/u for Osteoporosis and Thyroid goiter. She has had no problems with the Prolia injections..  She has had no falls in the last year. Recent Dexa Scan showed: -There has been a 2.5% increase in lumbar spine BMD.  -There has been a 0.4% decrease in bilateral hip BMD. These are not statistically significant and indicate stability, which is a positive response to the Prolia. She remains quite active and doing weight bearing activity regularly. Calcium level is 9.1 and her Vit d level is 62. She is getting another Prolia injection today.     She continues on Levothyroxine 25 mcg daily and is feeling sufficient energy. TSH is currently 2.16 and fT4  is 1.17. She is symptomatic for Sjogren's Syndrome with dry eyes and dry mouth. Suggested that she try some Lemon drops, which should help with salivation.     Renal function remains in Stage 3 CKD, but stable.     Risk Factors     The following high- risk conditions have been ruled out: celiac disease, eating disorders, gastric bypass, hyperparathyroidism, inflammatory bowel disease, hyperthyroidism, rheumatoid arthritis, lupus, chronic kidney disease.     Ruth Pinon has the following risk factors: Age, Female gender, , Low BMI and Family history of osteoporosis     She is not on high risk medications such as glucocorticoids, anti-coagulants, anti-convulsants, or chemotherapy       Past Medical History     Patient Active Problem List   Diagnosis     Breast CA (H)     Thyroid goiter     H/O hysterectomy for benign disease     Social anxiety disorder     Osteopenia     Hearing loss, bilateral     Emphysema/COPD (H)     Age related osteoporosis     Age-related macular degeneration, dry, both eyes     Personal history of other drug therapy     Skin cancer of face     Chronic kidney disease, stage 3a (H)     Numbness and tingling of right upper extremity     Cervicalgia     Chronic right-sided thoracic back pain     Lumbar back pain     Carpal tunnel syndrome       Family History       family history includes Colon Cancer in her mother.    Social History      reports that she has never smoked. She has never used smokeless tobacco.      Review of Systems     Patient denies current pain, limited mobility, fractures.   Remainder per HPI.      Vital Signs     /58 (BP Location: Right arm, Patient Position: Sitting, Cuff Size: Adult Regular)   Pulse 58   Wt 63.2 kg (139 lb 6.4 oz)   LMP  (LMP Unknown)   SpO2 98%   BMI 21.83 kg/m      Physical Exam     Constitutional:  Well developed, Well nourished  HENT:  Normocephalic,   Neck: normal in appearance  Eyes:  PERRL, Conjunctiva pink  Respiratory:  No  respiratory distress  Skin: No acanthosis nigricans, lipoatrophy or lipodystrophy  Neurologic:  Alert & oriented x 3, nonfocal  Psychiatric:  Affect, Mood, Insight appropriate      Assessment     1. Age-related osteoporosis without current pathological fracture    2. Thyroid goiter    3. Personal history of other drug therapy        Plan     Ruth will continue on the  Prolia injections, She will continue on them and see me in a year.      She will continue with the Levothyroxine 25 mcg daily.        Cora Bourne NP   Endocrinology  7/11/2024  6:45 AM      Current Medications     Outpatient Medications Prior to Visit   Medication Sig Dispense Refill     ASCORBATE CALCIUM (VITAMIN C ORAL) [ASCORBATE CALCIUM (VITAMIN C ORAL)] Take by mouth.       finasteride (PROSCAR) 5 MG tablet Take 5 mg by mouth daily       levothyroxine (SYNTHROID/LEVOTHROID) 25 MCG tablet TAKE ONE TABLET BY MOUTH ONCE DAILY 90 tablet 1     MAGNESIUM ORAL [MAGNESIUM ORAL] Take by mouth.       NEW MED PRESSURE VISION AREDS -OTC SUPPLEMENT.  1 capsules twice daily       b complex vitamins tablet [B COMPLEX VITAMINS TABLET] Take 1 tablet by mouth daily.       cyanocobalamin (VITAMIN B-12) 500 MCG tablet Take 500 mcg by mouth Three times a week       niacinamide 500 MG tablet        OMEGA-3S/DHA/EPA/FISH OIL (OMEGA 3 ORAL) [OMEGA-3S/DHA/EPA/FISH OIL (OMEGA 3 ORAL)] Take by mouth.       zinc gluconate 50 MG tablet        Facility-Administered Medications Prior to Visit   Medication Dose Route Frequency Provider Last Rate Last Admin     denosumab (PROLIA) injection 60 mg  60 mg Subcutaneous Q6 Months Cora Bourne NP   60 mg at 07/10/24 1514         Lab Results     TSH   Date Value Ref Range Status   07/03/2024 2.16 0.30 - 4.20 uIU/mL Final   05/06/2022 1.83 0.30 - 5.00 uIU/mL Final           Imaging Results   Last DEXA scan:  No valid procedures specified.    Study Result    Narrative & Impression   EXAM: DX HIP/PELVIS/SPINE  LOCATION: [facility]  DATE: 8/10/2023     INDICATION: Postmenopausal female taking over-the-counter calcium and Synthroid. History of hypothyroidism. There is a family history of osteoporosis. Family history of fractures.     DEMOGRAPHICS: Age - 78 years. Gender - female. Menopausal status - postmenopausal.     COMPARISON: 5/24/2022.     TECHNIQUE: Dual-energy x-ray absorptiometry (DXA) performed with routine technique. Trabecular bone score (TBS) analysis performed.     FINDINGS:  DXA RESULTS  -Lumbar Spine: L1-L2: BMD: 1.093 g/cm2. T-score: -0.6. Z-score: 1.2. Degenerative changes of L3 and L4.  -RIGHT Hip Total: BMD: 0.799 g/cm2. T-score: -1.7. Z-score: 0.1.  -RIGHT Hip Femoral neck: BMD: 0.751 g/cm2. T-score: -2.1. Z-score: -0.1.  -LEFT Hip Total: BMD: 0.835 g/cm2. T-score: -1.4. Z-score: 0.4.  -LEFT Hip Femoral neck: BMD: 0.785 g/cm2. T-score: -1.8. Z-score: 0.1.     WHO T-SCORE CRITERIA  -Normal: T-score at or above -1 SD  -Osteopenia: T-score between -1 and -2.5 SD  -Osteoporosis: T-score at or below -2.5 SD     The World Health Organization (WHO) criteria is applicable to perimenopausal females, postmenopausal females, and men aged 50 years or older.     TBS RESULTS  -Lumbar Spine L1-L2: TBS: 1.225. TBS T-score: -2.8.TBS Z-score: 0.1.     The TBS is a DXA derived measurement for fracture risk assessment, and reflects the structural condition of the bone microarchitecture. It can be used to adjust WHO Fracture Risk Assessment Tool (FRAX) probability of fracture in postmenopausal women and   older men. The calculated probabilities of fracture have been shown to be more accurate when computed with the TBS.     INTERVAL CHANGE  -There has been a 2.5% increase in lumbar spine BMD.  -There has been a 0.4% decrease in bilateral hip BMD.     FRACTURE RISK  -FRAX Results: The 10 year probability of major osteoporotic fracture is 23.6%, and of hip fracture is 14.8%, based on right femoral neck  BMD.  -TBS-adjusted FRAX Results: The 10 year probability of major osteoporotic fracture is 23.6%, and of hip fracture is 14.4%.     RECOMMENDATIONS: Consider treatment if major osteoporotic fracture score is greater than or equal to 20%, and if the hip fracture score is greater than or equal to 3%.                                                                      IMPRESSION: Low bone density (OSTEOPENIA). T-score meets the WHO criteria for low bone density (osteopenia) at one or more measured sites. The risk of osteoporotic fracture increases approximately twofold for each standard deviation decrease in T-score.          Again, thank you for allowing me to participate in the care of your patient.        Sincerely,        Cora Bourne NP

## 2024-07-11 PROBLEM — G56.00 CARPAL TUNNEL SYNDROME: Status: ACTIVE | Noted: 2024-02-27

## 2024-07-11 RX ORDER — LEVOTHYROXINE SODIUM 25 UG/1
25 TABLET ORAL DAILY
Qty: 90 TABLET | Refills: 3 | Status: SHIPPED | OUTPATIENT
Start: 2024-07-11

## 2024-07-11 NOTE — PROGRESS NOTES
[facility]  ENDOCRINOLOGY    Osteoporosis Follow Up 7/11/2024    Ruth Pinon, 1945, [id number]          Reason for visit      1. Age-related osteoporosis without current pathological fracture    2. Thyroid goiter    3. Personal history of other drug therapy        History     Ruth Pinon is a very pleasant 78 year old old female who presents for follow up.   SUMMARY:  Ruth is contacted today via Video visit to establish care for Osteoporosis and Postablative Hypothyroidism. She has a family hx of Osteo in her mother. She was a smoker, but quit some time ago. She has used Bisphosphonates in the past, but is not on anything currently. She has the unfortunate hx of Breast Cancer with Aromatase inhibitor and Radiation treatment. She has had a ISAIAH about the same time that she would be experiencing Menopause. Her most recent Dexa scan showed that she had lost BMD in both hips, and that her L forearm density is quite low at -3.7.  She is interested in starting Prolia.      TODAY:    Ruth is contacted today via Video Visit in f/u for Osteoporosis and Thyroid goiter. She has had no problems with the Prolia injections..  She has had no falls in the last year. Recent Dexa Scan showed: -There has been a 2.5% increase in lumbar spine BMD.  -There has been a 0.4% decrease in bilateral hip BMD. These are not statistically significant and indicate stability, which is a positive response to the Prolia. She remains quite active and doing weight bearing activity regularly. Calcium level is 9.1 and her Vit d level is 62. She is getting another Prolia injection today.     She continues on Levothyroxine 25 mcg daily and is feeling sufficient energy. TSH is currently 2.16 and fT4 is 1.17. She is symptomatic for Sjogren's Syndrome with dry eyes and dry mouth. Suggested that she try some Lemon drops, which should help with salivation.     Renal function remains in Stage 3 CKD, but stable.     Risk Factors     The  following high- risk conditions have been ruled out: celiac disease, eating disorders, gastric bypass, hyperparathyroidism, inflammatory bowel disease, hyperthyroidism, rheumatoid arthritis, lupus, chronic kidney disease.     Ruth Pinon has the following risk factors: Age, Female gender, , Low BMI and Family history of osteoporosis     She is not on high risk medications such as glucocorticoids, anti-coagulants, anti-convulsants, or chemotherapy       Past Medical History     Patient Active Problem List   Diagnosis    Breast CA (H)    Thyroid goiter    H/O hysterectomy for benign disease    Social anxiety disorder    Osteopenia    Hearing loss, bilateral    Emphysema/COPD (H)    Age related osteoporosis    Age-related macular degeneration, dry, both eyes    Personal history of other drug therapy    Skin cancer of face    Chronic kidney disease, stage 3a (H)    Numbness and tingling of right upper extremity    Cervicalgia    Chronic right-sided thoracic back pain    Lumbar back pain    Carpal tunnel syndrome       Family History       family history includes Colon Cancer in her mother.    Social History      reports that she has never smoked. She has never used smokeless tobacco.      Review of Systems     Patient denies current pain, limited mobility, fractures.   Remainder per HPI.      Vital Signs     /58 (BP Location: Right arm, Patient Position: Sitting, Cuff Size: Adult Regular)   Pulse 58   Wt 63.2 kg (139 lb 6.4 oz)   LMP  (LMP Unknown)   SpO2 98%   BMI 21.83 kg/m      Physical Exam     Constitutional:  Well developed, Well nourished  HENT:  Normocephalic,   Neck: normal in appearance  Eyes:  PERRL, Conjunctiva pink  Respiratory:  No respiratory distress  Skin: No acanthosis nigricans, lipoatrophy or lipodystrophy  Neurologic:  Alert & oriented x 3, nonfocal  Psychiatric:  Affect, Mood, Insight appropriate      Assessment     1. Age-related osteoporosis without current pathological  fracture    2. Thyroid goiter    3. Personal history of other drug therapy        Plan     Ruth will continue on the  Prolia injections, She will continue on them and see me in a year.      She will continue with the Levothyroxine 25 mcg daily.        Cora Bourne NP  [facility] Endocrinology  7/11/2024  6:45 AM      Current Medications     Outpatient Medications Prior to Visit   Medication Sig Dispense Refill    ASCORBATE CALCIUM (VITAMIN C ORAL) [ASCORBATE CALCIUM (VITAMIN C ORAL)] Take by mouth.      finasteride (PROSCAR) 5 MG tablet Take 5 mg by mouth daily      levothyroxine (SYNTHROID/LEVOTHROID) 25 MCG tablet TAKE ONE TABLET BY MOUTH ONCE DAILY 90 tablet 1    MAGNESIUM ORAL [MAGNESIUM ORAL] Take by mouth.      NEW MED PRESSURE VISION AREDS -OTC SUPPLEMENT.  1 capsules twice daily      b complex vitamins tablet [B COMPLEX VITAMINS TABLET] Take 1 tablet by mouth daily.      cyanocobalamin (VITAMIN B-12) 500 MCG tablet Take 500 mcg by mouth Three times a week      niacinamide 500 MG tablet       OMEGA-3S/DHA/EPA/FISH OIL (OMEGA 3 ORAL) [OMEGA-3S/DHA/EPA/FISH OIL (OMEGA 3 ORAL)] Take by mouth.      zinc gluconate 50 MG tablet        Facility-Administered Medications Prior to Visit   Medication Dose Route Frequency Provider Last Rate Last Admin    denosumab (PROLIA) injection 60 mg  60 mg Subcutaneous Q6 Months Cora Bourne NP   60 mg at 07/10/24 1514         Lab Results     TSH   Date Value Ref Range Status   07/03/2024 2.16 0.30 - 4.20 uIU/mL Final   05/06/2022 1.83 0.30 - 5.00 uIU/mL Final           Imaging Results   Last DEXA scan:  No valid procedures specified.    Study Result    Narrative & Impression   EXAM: DX HIP/PELVIS/SPINE  LOCATION: [facility]  DATE: 8/10/2023     INDICATION: Postmenopausal female taking over-the-counter calcium and Synthroid. History of hypothyroidism. There is a family history of osteoporosis. Family history of fractures.     DEMOGRAPHICS: Age - 78 years.  Gender - female. Menopausal status - postmenopausal.     COMPARISON: 5/24/2022.     TECHNIQUE: Dual-energy x-ray absorptiometry (DXA) performed with routine technique. Trabecular bone score (TBS) analysis performed.     FINDINGS:  DXA RESULTS  -Lumbar Spine: L1-L2: BMD: 1.093 g/cm2. T-score: -0.6. Z-score: 1.2. Degenerative changes of L3 and L4.  -RIGHT Hip Total: BMD: 0.799 g/cm2. T-score: -1.7. Z-score: 0.1.  -RIGHT Hip Femoral neck: BMD: 0.751 g/cm2. T-score: -2.1. Z-score: -0.1.  -LEFT Hip Total: BMD: 0.835 g/cm2. T-score: -1.4. Z-score: 0.4.  -LEFT Hip Femoral neck: BMD: 0.785 g/cm2. T-score: -1.8. Z-score: 0.1.     WHO T-SCORE CRITERIA  -Normal: T-score at or above -1 SD  -Osteopenia: T-score between -1 and -2.5 SD  -Osteoporosis: T-score at or below -2.5 SD     The World Health Organization (WHO) criteria is applicable to perimenopausal females, postmenopausal females, and men aged 50 years or older.     TBS RESULTS  -Lumbar Spine L1-L2: TBS: 1.225. TBS T-score: -2.8.TBS Z-score: 0.1.     The TBS is a DXA derived measurement for fracture risk assessment, and reflects the structural condition of the bone microarchitecture. It can be used to adjust WHO Fracture Risk Assessment Tool (FRAX) probability of fracture in postmenopausal women and   older men. The calculated probabilities of fracture have been shown to be more accurate when computed with the TBS.     INTERVAL CHANGE  -There has been a 2.5% increase in lumbar spine BMD.  -There has been a 0.4% decrease in bilateral hip BMD.     FRACTURE RISK  -FRAX Results: The 10 year probability of major osteoporotic fracture is 23.6%, and of hip fracture is 14.8%, based on right femoral neck BMD.  -TBS-adjusted FRAX Results: The 10 year probability of major osteoporotic fracture is 23.6%, and of hip fracture is 14.4%.     RECOMMENDATIONS: Consider treatment if major osteoporotic fracture score is greater than or equal to 20%, and if the hip fracture score is greater  than or equal to 3%.                                                                      IMPRESSION: Low bone density (OSTEOPENIA). T-score meets the WHO criteria for low bone density (osteopenia) at one or more measured sites. The risk of osteoporotic fracture increases approximately twofold for each standard deviation decrease in T-score.

## 2025-01-06 ENCOUNTER — TRANSFERRED RECORDS (OUTPATIENT)
Dept: HEALTH INFORMATION MANAGEMENT | Facility: CLINIC | Age: 80
End: 2025-01-06
Payer: COMMERCIAL

## 2025-01-09 ENCOUNTER — LAB (OUTPATIENT)
Dept: LAB | Facility: CLINIC | Age: 80
End: 2025-01-09
Payer: COMMERCIAL

## 2025-01-09 DIAGNOSIS — M81.0 AGE-RELATED OSTEOPOROSIS WITHOUT CURRENT PATHOLOGICAL FRACTURE: ICD-10-CM

## 2025-01-09 DIAGNOSIS — E04.9 THYROID GOITER: ICD-10-CM

## 2025-01-09 LAB
CALCIUM SERPL-MCNC: 9.8 MG/DL (ref 8.8–10.4)
CREAT SERPL-MCNC: 1 MG/DL (ref 0.51–0.95)
EGFRCR SERPLBLD CKD-EPI 2021: 58 ML/MIN/1.73M2
T4 FREE SERPL-MCNC: 1.3 NG/DL (ref 0.9–1.7)
TSH SERPL DL<=0.005 MIU/L-ACNC: 2.01 UIU/ML (ref 0.3–4.2)
VIT D+METAB SERPL-MCNC: 57 NG/ML (ref 20–50)

## 2025-01-14 ENCOUNTER — ALLIED HEALTH/NURSE VISIT (OUTPATIENT)
Dept: ENDOCRINOLOGY | Facility: CLINIC | Age: 80
End: 2025-01-14
Payer: COMMERCIAL

## 2025-01-14 DIAGNOSIS — M81.0 SENILE OSTEOPOROSIS: Primary | ICD-10-CM

## 2025-01-14 PROCEDURE — 96372 THER/PROPH/DIAG INJ SC/IM: CPT | Performed by: NURSE PRACTITIONER

## 2025-01-14 PROCEDURE — 99207 PR NO CHARGE NURSE ONLY: CPT

## 2025-01-14 NOTE — PROGRESS NOTES
Clinic Administered Medication Documentation      Prolia Documentation    Indication: Prolia  (denosumab) is a prescription medicine used to treat osteoporosis in patients who:   Are at high risk for fracture, meaning patients who have had a fracture related to osteoporosis, or who have multiple risk factors for fracture.  Cannot use another osteoporosis medicine or other osteoporosis medicines did not work well.  The timeline for early/late injections would be 4 weeks early and any time after the 6 month alayna. If a patient receives their injection late, then the subsequent injection would be 6 months from the date that they actually received the injection.    When was the last injection?  7/10/24  Was the last injection at least 6 months ago? Yes  Has the prior authorization been completed?  Yes  Is there an active order (written within the past 365 days, with administrations remaining, not ) in the chart?  Yes   GFR Estimate   Date Value Ref Range Status   2025 58 (L) >60 mL/min/1.73m2 Final     Comment:     eGFR calculated using  CKD-EPI equation.   2021 46 (L) >60 mL/min/1.73m2 Final     Has patient had a GFR within the last 12 months? Yes   Is GFR under 30, or patient has a diagnosis of CKD4 or CKD5? No   Patient denies gastric bypass or parathyroid surgery in past 6 months? Yes - patient denies.   Patient denies dental work in the past two months involving drilling into the bone, such as implants/extractions, oral surgery or a tooth extraction that has not healed yet?  Yes  Patient denies plans for an emergency tooth extraction within the next week? Yes    The following steps were completed to comply with the REMS program for Prolia:  Reviewed information in the Medication Guide, including the serious risks of Prolia  and the symptoms of each risk.  Advised patient to seek prompt medical attention if they have signs or symptoms of any of the serious risks.  Provided each patient a copy  of the Medication Guide and Patient Guide.    Prior to injection, verified patient identity using patient's name and date of birth. Medication was administered. Please see MAR and medication order for additional information. Patient instructed to remain in clinic for 15 minutes and report any adverse reaction to staff immediately.    Vial/Syringe: Syringe  Was this medication supplied by the patient? No  Verified that the patient has administrations remaining in their prescription.

## 2025-02-03 ENCOUNTER — PATIENT OUTREACH (OUTPATIENT)
Dept: CARE COORDINATION | Facility: CLINIC | Age: 80
End: 2025-02-03
Payer: COMMERCIAL

## 2025-02-20 ENCOUNTER — OFFICE VISIT (OUTPATIENT)
Dept: FAMILY MEDICINE | Facility: CLINIC | Age: 80
End: 2025-02-20
Payer: COMMERCIAL

## 2025-02-20 VITALS
HEART RATE: 80 BPM | OXYGEN SATURATION: 99 % | TEMPERATURE: 97 F | RESPIRATION RATE: 16 BRPM | WEIGHT: 139 LBS | HEIGHT: 67 IN | DIASTOLIC BLOOD PRESSURE: 60 MMHG | BODY MASS INDEX: 21.82 KG/M2 | SYSTOLIC BLOOD PRESSURE: 118 MMHG

## 2025-02-20 DIAGNOSIS — Z48.02 ENCOUNTER FOR REMOVAL OF SUTURES: Primary | ICD-10-CM

## 2025-02-20 ASSESSMENT — PAIN SCALES - GENERAL: PAINLEVEL_OUTOF10: NO PAIN (0)

## 2025-02-20 NOTE — PROGRESS NOTES
"  Assessment & Plan     Encounter for removal of sutures  Wound is well healed without evidence of infection. 4 sutures removed without difficulty. Discussed s/s infection, reasons for return.   - REMOVAL OF SUTURES            See Patient Instructions    Subjective   Ruth is a 79 year old, presenting for the following health issues: patient reporting she had carpal tunnel repair at the [facility] 2 weeks ago (2/6). Pt here for suture removal post operation. Patient denies any erythema, ecchymosis, edema and significant pain/ discomfort to the site. Denies any systemic fevers or malaise. Has taken one dose of tylenol over the 2 week recovery, pain has been controlled at home.   Suture Removal        2/20/2025     1:40 PM   Additional Questions   Roomed by Abena GUZMAN   Accompanied by self     History of Present Illness       Reason for visit:  To have stitches removed She is missing 1 dose(s) of medications per week.  She is not taking prescribed medications regularly due to remembering to take.             Review of Systems  Constitutional, HEENT, cardiovascular, pulmonary, gi and gu systems are negative, except as otherwise noted.      Objective    /60   Pulse 80   Temp 97  F (36.1  C) (Tympanic)   Resp 16   Ht 1.702 m (5' 7\")   Wt 63 kg (139 lb)   LMP  (LMP Unknown)   SpO2 99%   BMI 21.77 kg/m    Body mass index is 21.77 kg/m .  Physical Exam  Vitals and nursing note reviewed.   Constitutional:       General: She is not in acute distress.     Appearance: Normal appearance.   HENT:      Head: Normocephalic.   Eyes:      Extraocular Movements: Extraocular movements intact.      Pupils: Pupils are equal, round, and reactive to light.   Musculoskeletal:         General: Normal range of motion.      Cervical back: Normal range of motion.   Skin:     Findings: Laceration present. No abscess, bruising, ecchymosis, erythema or rash.      Comments: Left wrist surgical site is clean, dry and intact post suture " removal.    Neurological:      General: No focal deficit present.      Mental Status: She is alert.   Psychiatric:         Mood and Affect: Mood normal.         Behavior: Behavior normal. Behavior is cooperative.         Thought Content: Thought content normal.         Judgment: Judgment normal.        The wound is well healed without signs of infection.  There were 4 sutures were removed.  Patient tolerated procedure without problems.      LILIANA Coleman        Signed Electronically by: MILDRED Murguia CNP

## 2025-02-27 DIAGNOSIS — M35.00 SICCA: ICD-10-CM

## 2025-02-27 DIAGNOSIS — M51.34 DEGENERATION OF INTERVERTEBRAL DISC OF THORACIC REGION: Primary | ICD-10-CM

## 2025-02-27 DIAGNOSIS — M79.9 MUSCULOSKELETAL PROBLEM: ICD-10-CM

## 2025-03-05 ENCOUNTER — LAB (OUTPATIENT)
Dept: LAB | Facility: CLINIC | Age: 80
End: 2025-03-05
Payer: COMMERCIAL

## 2025-03-05 DIAGNOSIS — M79.9 MUSCULOSKELETAL PROBLEM: ICD-10-CM

## 2025-03-05 DIAGNOSIS — M35.00 SICCA: ICD-10-CM

## 2025-03-05 LAB
Lab: NORMAL
PERFORMING LABORATORY: NORMAL
SPECIMEN STATUS: NORMAL
TEST NAME: NORMAL

## 2025-03-05 SDOH — HEALTH STABILITY: PHYSICAL HEALTH: ON AVERAGE, HOW MANY DAYS PER WEEK DO YOU ENGAGE IN MODERATE TO STRENUOUS EXERCISE (LIKE A BRISK WALK)?: 3 DAYS

## 2025-03-05 SDOH — HEALTH STABILITY: PHYSICAL HEALTH: ON AVERAGE, HOW MANY MINUTES DO YOU ENGAGE IN EXERCISE AT THIS LEVEL?: 40 MIN

## 2025-03-05 ASSESSMENT — SOCIAL DETERMINANTS OF HEALTH (SDOH): HOW OFTEN DO YOU GET TOGETHER WITH FRIENDS OR RELATIVES?: ONCE A WEEK

## 2025-03-07 ENCOUNTER — ANCILLARY PROCEDURE (OUTPATIENT)
Dept: MAMMOGRAPHY | Facility: CLINIC | Age: 80
End: 2025-03-07
Attending: FAMILY MEDICINE
Payer: COMMERCIAL

## 2025-03-07 DIAGNOSIS — Z12.31 VISIT FOR SCREENING MAMMOGRAM: ICD-10-CM

## 2025-03-07 PROCEDURE — 77063 BREAST TOMOSYNTHESIS BI: CPT

## 2025-03-10 ENCOUNTER — OFFICE VISIT (OUTPATIENT)
Dept: FAMILY MEDICINE | Facility: CLINIC | Age: 80
End: 2025-03-10
Attending: FAMILY MEDICINE
Payer: COMMERCIAL

## 2025-03-10 VITALS
DIASTOLIC BLOOD PRESSURE: 65 MMHG | WEIGHT: 140.2 LBS | HEART RATE: 55 BPM | OXYGEN SATURATION: 100 % | HEIGHT: 67 IN | BODY MASS INDEX: 22 KG/M2 | RESPIRATION RATE: 16 BRPM | TEMPERATURE: 97.6 F | SYSTOLIC BLOOD PRESSURE: 145 MMHG

## 2025-03-10 DIAGNOSIS — N39.41 URGE INCONTINENCE OF URINE: ICD-10-CM

## 2025-03-10 DIAGNOSIS — M79.9 MUSCULOSKELETAL PROBLEM: ICD-10-CM

## 2025-03-10 DIAGNOSIS — H04.123 DRY EYES: ICD-10-CM

## 2025-03-10 DIAGNOSIS — Z85.3 PERSONAL HISTORY OF MALIGNANT NEOPLASM OF BREAST: ICD-10-CM

## 2025-03-10 DIAGNOSIS — E78.00 HYPERCHOLESTEROLEMIA: ICD-10-CM

## 2025-03-10 DIAGNOSIS — M81.0 AGE-RELATED OSTEOPOROSIS WITHOUT CURRENT PATHOLOGICAL FRACTURE: ICD-10-CM

## 2025-03-10 DIAGNOSIS — E04.9 THYROID GOITER: ICD-10-CM

## 2025-03-10 DIAGNOSIS — N18.31 CHRONIC KIDNEY DISEASE, STAGE 3A (H): ICD-10-CM

## 2025-03-10 DIAGNOSIS — J43.9 PULMONARY EMPHYSEMA, UNSPECIFIED EMPHYSEMA TYPE (H): ICD-10-CM

## 2025-03-10 DIAGNOSIS — R68.89 LIGHT SENSITIVITY: ICD-10-CM

## 2025-03-10 DIAGNOSIS — G56.03 BILATERAL CARPAL TUNNEL SYNDROME: ICD-10-CM

## 2025-03-10 DIAGNOSIS — M48.02 FORAMINAL STENOSIS OF CERVICAL REGION: ICD-10-CM

## 2025-03-10 DIAGNOSIS — Z00.00 ENCOUNTER FOR MEDICARE ANNUAL WELLNESS EXAM: Primary | ICD-10-CM

## 2025-03-10 PROBLEM — Z92.29 PERSONAL HISTORY OF OTHER DRUG THERAPY: Status: RESOLVED | Noted: 2022-06-07 | Resolved: 2025-03-10

## 2025-03-10 LAB
ALBUMIN UR-MCNC: NEGATIVE MG/DL
APPEARANCE UR: CLEAR
BILIRUB UR QL STRIP: NEGATIVE
COLOR UR AUTO: YELLOW
GLUCOSE UR STRIP-MCNC: NEGATIVE MG/DL
HGB UR QL STRIP: NEGATIVE
KETONES UR STRIP-MCNC: NEGATIVE MG/DL
LEUKOCYTE ESTERASE UR QL STRIP: NEGATIVE
NITRATE UR QL: NEGATIVE
PH UR STRIP: 7.5 [PH] (ref 5–8)
SP GR UR STRIP: 1.01 (ref 1–1.03)
UROBILINOGEN UR STRIP-ACNC: 0.2 E.U./DL

## 2025-03-10 PROCEDURE — 82570 ASSAY OF URINE CREATININE: CPT | Performed by: FAMILY MEDICINE

## 2025-03-10 PROCEDURE — 80053 COMPREHEN METABOLIC PANEL: CPT | Performed by: FAMILY MEDICINE

## 2025-03-10 PROCEDURE — 80061 LIPID PANEL: CPT | Performed by: FAMILY MEDICINE

## 2025-03-10 PROCEDURE — 81003 URINALYSIS AUTO W/O SCOPE: CPT | Performed by: FAMILY MEDICINE

## 2025-03-10 PROCEDURE — 82043 UR ALBUMIN QUANTITATIVE: CPT | Performed by: FAMILY MEDICINE

## 2025-03-10 PROCEDURE — 36415 COLL VENOUS BLD VENIPUNCTURE: CPT | Performed by: FAMILY MEDICINE

## 2025-03-10 RX ORDER — LEVOTHYROXINE SODIUM 25 UG/1
TABLET ORAL
Status: SHIPPED
Start: 2025-03-10

## 2025-03-10 NOTE — PATIENT INSTRUCTIONS
Patient Education     Ruth,    It was great to see you!  We will check your blood and urine tests as discussed  Continue follow-up with rheumatology  I do encourage a mammogram  Continue to follow-up with Cora regarding your bone density and Prolia injections  She will monitor your thyroid function as well  You can consider seeing a urologist regarding your urinary concerns  Continues to do the Kegel exercises  Continue to follow-up with the rheumatologist    Hugo Clarke MD      Preventive Care Advice   This is general advice given by our system to help you stay healthy. However, your care team may have specific advice just for you. Please talk to your care team about your preventive care needs.  Nutrition  Eat 5 or more servings of fruits and vegetables each day.  Try wheat bread, brown rice and whole grain pasta (instead of white bread, rice, and pasta).  Get enough calcium and vitamin D. Check the label on foods and aim for 100% of the RDA (recommended daily allowance).  Lifestyle  Exercise at least 150 minutes each week  (30 minutes a day, 5 days a week).  Do muscle strengthening activities 2 days a week. These help control your weight and prevent disease.  No smoking.  Wear sunscreen to prevent skin cancer.  Have a dental exam and cleaning every 6 months.  Yearly exams  See your health care team every year to talk about:  Any changes in your health.  Any medicines your care team has prescribed.  Preventive care, family planning, and ways to prevent chronic diseases.  Shots (vaccines)   HPV shots (up to age 26), if you've never had them before.  Hepatitis B shots (up to age 59), if you've never had them before.  COVID-19 shot: Get this shot when it's due.  Flu shot: Get a flu shot every year.  Tetanus shot: Get a tetanus shot every 10 years.  Pneumococcal, hepatitis A, and RSV shots: Ask your care team if you need these based on your risk.  Shingles shot (for age 50 and up)  General health  tests  Diabetes screening:  Starting at age 35, Get screened for diabetes at least every 3 years.  If you are younger than age 35, ask your care team if you should be screened for diabetes.  Cholesterol test: At age 39, start having a cholesterol test every 5 years, or more often if advised.  Bone density scan (DEXA): At age 50, ask your care team if you should have this scan for osteoporosis (brittle bones).  Hepatitis C: Get tested at least once in your life.

## 2025-03-10 NOTE — PROGRESS NOTES
Preventive Care Visit  [facility]  Hugo Clarke MD, Family Medicine  Mar 10, 2025      Assessment & Plan     Encounter for Medicare annual wellness exam    Reviewed vaccines    Cologuard was negative in 2024    Chronic kidney disease, stage 3a (H)    Recommend good control of blood pressure  Blood pressure is elevated today  Encourage follow-up for a nurse check or she can consider home blood pressure monitoring  Recommend avoiding NSAIDs and nephrotoxic substances  Continue to monitor  Check a urine test and if there is evidence of microalbuminuria consider an ACE inhibitor    - BASIC METABOLIC PANEL; Future  - UA Macroscopic with reflex to Microscopic and Culture; Future  - Albumin Random Urine Quantitative with Creat Ratio; Future  - UA Macroscopic with reflex to Microscopic and Culture  - Albumin Random Urine Quantitative with Creat Ratio    Hypercholesterolemia    Check a lipid cascade and calculate the 10-year cardiovascular risk  Continue to work on dietary and lifestyle changes    - Lipid panel reflex to direct LDL Non-fasting; Future  - Comprehensive metabolic panel; Future  - Lipid panel reflex to direct LDL Non-fasting  - Comprehensive metabolic panel    Pulmonary emphysema, unspecified emphysema type (H)    There was evidence of emphysema on previous imaging when at the [facility]  She does have a sense of chest congestion at times  Recommend considering pulmonary function testing/spirometry for further evaluation  She will monitor at this point     Bilateral carpal tunnel syndrome    She had electromyelogram showing moderate carpal tunnel syndrome on the right and mild carpal tunnel syndrome on the left   She had recent right carpal tunnel release on the right    She states she would like to start woodcarving  Recommend considering wearing wrist splints    Thyroid goiter  Hypothyroidism    Continue plan per endocrinology, Cora Bourne    - levothyroxine  (SYNTHROID/LEVOTHROID) 25 MCG tablet; Take 1/2 PO Qday    Dry eyes  Light sensitivity    She has followed up with ophthalmology and currently is following with rheumatology      Age-related osteoporosis without current pathological fracture    Continue Prolia per endocrinology    Urge incontinence of urine    Recommend Kegel exercises  Reviewed medication options no there could be concern with side effects and she does have significant issues with dry eyes  Consider referral to urology for urodynamic evaluation    Musculoskeletal problem  Foraminal stenosis of cervical region    Reviewed the MRI showing foraminal stenosis of the cervical spine  She will monitor and will follow-up with a chiropractor    Consider further evaluation as warranted    History of breast cancer  S/P lumpectomy left breast    Recent mammogram was normal          The longitudinal plan of care for the diagnosis(es)/condition(s) as documented were addressed during this visit. Due to the added complexity in care, I will continue to support Ruth in the subsequent management and with ongoing continuity of care.      Counseling  Appropriate preventive services were addressed with this patient via screening, questionnaire, or discussion as appropriate for fall prevention, nutrition, physical activity, Tobacco-use cessation, social engagement, weight loss and cognition.  Checklist reviewing preventive services available has been given to the patient.  Reviewed patient's diet, addressing concerns and/or questions.   She is at risk for lack of exercise and has been provided with information to increase physical activity for the benefit of her well-being.   She is at risk for psychosocial distress and has been provided with information to reduce risk.   Discussed possible causes of fatigue. Patient reported safety concerns were addressed today.Addressed any concerns about safety while driving.  The patient was provided with written information  regarding signs of hearing loss.   Information on urinary incontinence and treatment options given to patient.           Dino Miranda is a 79 year old, presenting for the following:  Wellness Visit        3/10/2025    10:25 AM   Additional Questions   Roomed by Tonja VIEYRA    Ruth is a pleasant 79-year-old female who presents to the clinic for a wellness visit.    She has a complex medical history notable for emphysema, osteoporosis, and chronic kidney disease.    This past year she did have an evaluation for ongoing neck pain as well as numbness tingling in her right arm.  She followed up and was diagnosed with carpal tunnel syndrome.  She had an abnormal MRI which revealed foraminal stenosis.    She continues to be treated with levothyroxine and follows up with Cora Bourne regarding that.      She has a history of breast cancer with previous left breast lumpectomy.     She has been following up with rheumatology and has had dry eyes and light sensitivity.  She has noted cracked skin and gets sores in her mouth.  She also has experienced urinary incontinence.              Advance Care Planning  Patient has a Health Care Directive on file  Advance care planning document is on file and is current.      3/5/2025   General Health   How would you rate your overall physical health? Good   Feel stress (tense, anxious, or unable to sleep) To some extent   (!) STRESS CONCERN      3/5/2025   Nutrition   Diet: Regular (no restrictions)         3/5/2025   Exercise   Days per week of moderate/strenous exercise 3 days   Average minutes spent exercising at this level 40 min         3/5/2025   Social Factors   Frequency of gathering with friends or relatives Once a week   Worry food won't last until get money to buy more No   Food not last or not have enough money for food? No   Do you have housing? (Housing is defined as stable permanent housing and does not include staying ouside in a car, in a tent, in an  abandoned building, in an overnight shelter, or couch-surfing.) No   Are you worried about losing your housing? No   Lack of transportation? No   Unable to get utilities (heat,electricity)? Yes   Want help with housing or utility concern? No   (!) HOUSING CONCERN PRESENT(!) FINANCIAL RESOURCE STRAIN CONCERN      3/10/2025   Fall Risk   Gait Speed Test (Document in seconds) 5   Gait Speed Test Interpretation Less than or equal to 5.00 seconds - PASS          3/5/2025   Activities of Daily Living- Home Safety   Needs help with the following daily activites None of the above   Safety concerns in the home No grab bars in the bathroom         3/5/2025   Dental   Dentist two times every year? Yes         3/5/2025   Hearing Screening   Hearing concerns? (!) I NEED TO ASK PEOPLE TO SPEAK UP OR REPEAT THEMSELVES.    (!) IT'S HARD TO FOLLOW A CONVERSATION IN A NOISY RESTAURANT OR CROWDED ROOM.    (!) TROUBLE UNDESTANDING A SPEAKER IN A PUBLIC MEETING OR [employer] SERVICE.    (!) TROUBLE FOLLOWING DIALOGUE IN THE THEATHER.    (!) TROUBLE UNDERSTANDING SOFT OR WHISPERED SPEECH.       Multiple values from one day are sorted in reverse-chronological order         3/5/2025   Driving Risk Screening   Patient/family members have concerns about driving (!) YES          3/5/2025   General Alertness/Fatigue Screening   Have you been more tired than usual lately? (!) YES         3/5/2025   Urinary Incontinence Screening   Bothered by leaking urine in past 6 months Yes         2/25/2024   TB Screening   Were you born outside of the [place]? No         Today's PHQ-2 Score:       3/9/2025    11:35 AM   PHQ-2 ( 1999 [employer])   Q1: Little interest or pleasure in doing things 1   Q2: Feeling down, depressed or hopeless 1   PHQ-2 Score 2    Q1: Little interest or pleasure in doing things Several days   Q2: Feeling down, depressed or hopeless Several days   PHQ-2 Score 2       Patient-reported           3/5/2025   Substance Use   Alcohol more than  3/day or more than 7/wk No   Do you have a current opioid prescription? No   How severe/bad is pain from 1 to 10? 7/10   Do you use any other substances recreationally? (!) BATH SALTS     Social History     Tobacco Use    Smoking status: Never    Smokeless tobacco: Never   Vaping Use    Vaping status: Never Used           3/4/2024   LAST FHS-7 RESULTS   1st degree relative breast or ovarian cancer No   Any relative bilateral breast cancer No   Any male have breast cancer No   Any ONE woman have BOTH breast AND ovarian cancer No   Any woman with breast cancer before 50yrs No   2 or more relatives with breast AND/OR ovarian cancer No   2 or more relatives with breast AND/OR bowel cancer No        Mammogram Screening - After age 74- determine frequency with patient based on health status, life expectancy and patient goals    ASCVD Risk   The 10-year ASCVD risk score (Teena PICKERING, et al., 2019) is: 23.5%    Values used to calculate the score:      Age: 79 years      Sex: Female      Is Non- : No      Diabetic: No      Tobacco smoker: No      Systolic Blood Pressure: 141 mmHg      Is BP treated: No      HDL Cholesterol: 61 mg/dL      Total Cholesterol: 200 mg/dL            Reviewed and updated as needed this visit by Provider                      Current providers sharing in care for this patient include:  Patient Care Team:  Hugo Clarke MD as PCP - General (Family Medicine)  Cora Bourne NP as Nurse Practitioner  Corinne Cowan AuD as Audiologist (Audiology)  Hugo Clarke MD as Assigned PCP    The following health maintenance items are reviewed in Epic and correct as of today:  Health Maintenance   Topic Date Due    MICROALBUMIN  Never done    SPIROMETRY  Never done    COPD ACTION PLAN  Never done    ANNUAL REVIEW OF HM ORDERS  02/15/2023    BMP  02/20/2025    LIPID  02/20/2025    HEMOGLOBIN  02/20/2025    MEDICARE ANNUAL WELLNESS VISIT  02/26/2025    COVID-19 Vaccine (7  "- 2024-25 season) 04/18/2025    TSH W/FREE T4 REFLEX  01/09/2026    MAMMO SCREENING  03/07/2026    FALL RISK ASSESSMENT  03/10/2026    GLUCOSE  02/20/2027    ADVANCE CARE PLANNING  02/28/2029    DTAP/TDAP/TD IMMUNIZATION (3 - Td or Tdap) 09/18/2030    DEXA  08/10/2038    PHQ-2 (once per calendar year)  Completed    INFLUENZA VACCINE  Completed    Pneumococcal Vaccine: 50+ Years  Completed    URINALYSIS  Completed    ZOSTER IMMUNIZATION  Completed    RSV VACCINE  Completed    HPV IMMUNIZATION  Aged Out    MENINGITIS IMMUNIZATION  Aged Out    HEPATITIS C SCREENING  Discontinued    COLORECTAL CANCER SCREENING  Discontinued            Objective    Exam  BP (!) 141/61 (BP Location: Left arm, Patient Position: Sitting, Cuff Size: Adult Regular)   Pulse 58   Temp 97.6  F (36.4  C) (Oral)   Resp 16   Ht 1.702 m (5' 7\")   Wt 63.6 kg (140 lb 3.2 oz)   LMP  (LMP Unknown)   SpO2 100%   BMI 21.96 kg/m     Estimated body mass index is 21.96 kg/m  as calculated from the following:    Height as of this encounter: 1.702 m (5' 7\").    Weight as of this encounter: 63.6 kg (140 lb 3.2 oz).    Physical Exam  GENERAL: alert and no distress  EYES: Eyes grossly normal to inspection, PERRL and conjunctivae and sclerae normal  HENT: ear canals and TM's normal, nose and mouth without ulcers or lesions  NECK: no adenopathy, no asymmetry, masses, or scars  RESP: lungs clear to auscultation - no rales, rhonchi or wheezes  CV: regular rate and rhythm, normal S1 S2, no S3 or S4, no murmur, click or rub, no peripheral edema  PSYCH: mentation appears normal, affect normal/bright         3/10/2025   Mini Cog   Clock Draw Score 2 Normal   3 Item Recall 3 objects recalled   Mini Cog Total Score 5              Signed Electronically by: Hugo Clarke MD    "

## 2025-03-11 LAB
CHOLEST SERPL-MCNC: 206 MG/DL
CREAT UR-MCNC: 20.9 MG/DL
FASTING STATUS PATIENT QL REPORTED: NO
HDLC SERPL-MCNC: 64 MG/DL
LDLC SERPL CALC-MCNC: 125 MG/DL
MICROALBUMIN UR-MCNC: <12 MG/L
MICROALBUMIN/CREAT UR: NORMAL MG/G{CREAT}
NONHDLC SERPL-MCNC: 142 MG/DL
TRIGL SERPL-MCNC: 83 MG/DL

## 2025-03-13 LAB
ALBUMIN SERPL BCG-MCNC: 4.2 G/DL (ref 3.5–5.2)
ALP SERPL-CCNC: 51 U/L (ref 40–150)
ALT SERPL W P-5'-P-CCNC: 19 U/L (ref 0–50)
ANION GAP SERPL CALCULATED.3IONS-SCNC: 9 MMOL/L (ref 7–15)
AST SERPL W P-5'-P-CCNC: 25 U/L (ref 0–45)
BILIRUB SERPL-MCNC: 0.5 MG/DL
BUN SERPL-MCNC: 15.8 MG/DL (ref 8–23)
CALCIUM SERPL-MCNC: 9.8 MG/DL (ref 8.8–10.4)
CHLORIDE SERPL-SCNC: 105 MMOL/L (ref 98–107)
CREAT SERPL-MCNC: 0.94 MG/DL (ref 0.51–0.95)
EGFRCR SERPLBLD CKD-EPI 2021: 62 ML/MIN/1.73M2
FASTING STATUS PATIENT QL REPORTED: NO
GLUCOSE SERPL-MCNC: 90 MG/DL (ref 70–99)
HCO3 SERPL-SCNC: 26 MMOL/L (ref 22–29)
POTASSIUM SERPL-SCNC: 4.6 MMOL/L (ref 3.4–5.3)
PROT SERPL-MCNC: 7.2 G/DL (ref 6.4–8.3)
SODIUM SERPL-SCNC: 140 MMOL/L (ref 135–145)

## 2025-03-17 ENCOUNTER — TRANSFERRED RECORDS (OUTPATIENT)
Dept: HEALTH INFORMATION MANAGEMENT | Facility: CLINIC | Age: 80
End: 2025-03-17
Payer: COMMERCIAL

## 2025-03-24 ENCOUNTER — E-VISIT (OUTPATIENT)
Dept: FAMILY MEDICINE | Facility: CLINIC | Age: 80
End: 2025-03-24
Payer: COMMERCIAL

## 2025-03-24 ENCOUNTER — TELEPHONE (OUTPATIENT)
Dept: FAMILY MEDICINE | Facility: CLINIC | Age: 80
End: 2025-03-24

## 2025-03-24 ENCOUNTER — LAB (OUTPATIENT)
Dept: LAB | Facility: CLINIC | Age: 80
End: 2025-03-24
Payer: COMMERCIAL

## 2025-03-24 DIAGNOSIS — R30.0 DYSURIA: ICD-10-CM

## 2025-03-24 DIAGNOSIS — N39.0 URINARY TRACT INFECTION WITHOUT HEMATURIA, SITE UNSPECIFIED: Primary | ICD-10-CM

## 2025-03-24 DIAGNOSIS — R30.0 DYSURIA: Primary | ICD-10-CM

## 2025-03-24 LAB
ALBUMIN UR-MCNC: ABNORMAL MG/DL
APPEARANCE UR: CLEAR
BACTERIA #/AREA URNS HPF: ABNORMAL /HPF
BILIRUB UR QL STRIP: NEGATIVE
COLOR UR AUTO: YELLOW
GLUCOSE UR STRIP-MCNC: NEGATIVE MG/DL
HGB UR QL STRIP: ABNORMAL
KETONES UR STRIP-MCNC: NEGATIVE MG/DL
LEUKOCYTE ESTERASE UR QL STRIP: ABNORMAL
NITRATE UR QL: NEGATIVE
PH UR STRIP: 6 [PH] (ref 5–8)
RBC #/AREA URNS AUTO: ABNORMAL /HPF
SP GR UR STRIP: 1.01 (ref 1–1.03)
SQUAMOUS #/AREA URNS AUTO: ABNORMAL /LPF
UROBILINOGEN UR STRIP-ACNC: 0.2 E.U./DL
WBC #/AREA URNS AUTO: ABNORMAL /HPF
WBC CLUMPS #/AREA URNS HPF: PRESENT /HPF

## 2025-03-24 PROCEDURE — 87086 URINE CULTURE/COLONY COUNT: CPT

## 2025-03-24 PROCEDURE — 87088 URINE BACTERIA CULTURE: CPT

## 2025-03-24 PROCEDURE — 87186 SC STD MICRODIL/AGAR DIL: CPT

## 2025-03-24 PROCEDURE — 81001 URINALYSIS AUTO W/SCOPE: CPT

## 2025-03-24 RX ORDER — NITROFURANTOIN 25; 75 MG/1; MG/1
100 CAPSULE ORAL 2 TIMES DAILY
Qty: 10 CAPSULE | Refills: 0 | Status: SHIPPED | OUTPATIENT
Start: 2025-03-24 | End: 2025-03-29

## 2025-03-24 NOTE — PATIENT INSTRUCTIONS
Dear Ruth Pinon,     After reviewing your responses, I would like you to come in for a urine test to make sure we treat you correctly. This urine test is to evaluate you for a possible urinary tract infection, and should be scheduled for today or tomorrow. Schedule a Lab Only appointment here.     Lab appointments are not available at most locations on the weekends. If no Lab Only appointment is available, you should be seen in any of our convenient Walk-in or Urgent Care Centers, which can be found on our website here.     You will receive instructions with your results in [facility] once they are available.     If your symptoms worsen, you develop pain in your back or stomach, develop fevers, or are not improving in 5 days, please contact your primary care provider for an appointment or visit a Walk-in or Urgent Care Center to be seen.     Thanks again for choosing us as your health care partner,     MD Isaac Darling,    I entered an order so you can check a urine test today. See the process above for scheduling. By the way, you should be able to have this done today.  Please let us know if there is a problem getting scheduled for today.    Hugo Clarke MD

## 2025-03-24 NOTE — TELEPHONE ENCOUNTER
Patient calling with UTI symptoms. RN instructed patient to submit and e-visit to obtain a UA. She is asking if this can be completed today. Writer informed patient that PCP would be informed of the e-visit being submitted today. Please advise    Miller Brooks RN

## 2025-03-24 NOTE — TELEPHONE ENCOUNTER
----- Message from Hugo Clarke sent at 3/24/2025  5:50 PM CDT -----  Please call to let her know results and also verify pharmacy.    Ruth,    Your urine test is abnormal and suggests an infection. I sent a prescription for Macrobid to your pharmacy. [facility] the [facility]. I can send that elsewhere if needed.     We will await the final culture results as well.    Hugo Clarke MD

## 2025-03-26 LAB — BACTERIA UR CULT: ABNORMAL

## 2025-06-09 DIAGNOSIS — I10 ESSENTIAL HYPERTENSION: ICD-10-CM

## 2025-06-09 DIAGNOSIS — E78.00 HYPERCHOLESTEROLEMIA: ICD-10-CM

## 2025-06-09 RX ORDER — ATORVASTATIN CALCIUM 10 MG/1
10 TABLET, FILM COATED ORAL DAILY
Qty: 30 TABLET | Refills: 1 | Status: SHIPPED | OUTPATIENT
Start: 2025-06-09

## 2025-06-09 RX ORDER — LISINOPRIL 10 MG/1
10 TABLET ORAL DAILY
Qty: 30 TABLET | Refills: 1 | Status: SHIPPED | OUTPATIENT
Start: 2025-06-09

## 2025-06-11 DIAGNOSIS — Z92.29 PERSONAL HISTORY OF OTHER DRUG THERAPY: ICD-10-CM

## 2025-06-11 DIAGNOSIS — M81.0 AGE-RELATED OSTEOPOROSIS WITHOUT CURRENT PATHOLOGICAL FRACTURE: Primary | ICD-10-CM

## 2025-07-01 LAB — LABCORP INTERFACED MISCELLANEOUS TEST RESULT: ABNORMAL

## 2025-07-04 ENCOUNTER — APPOINTMENT (OUTPATIENT)
Dept: GENERAL RADIOLOGY | Facility: CLINIC | Age: 80
End: 2025-07-04
Attending: FAMILY MEDICINE
Payer: COMMERCIAL

## 2025-07-04 ENCOUNTER — HOSPITAL ENCOUNTER (EMERGENCY)
Facility: CLINIC | Age: 80
Discharge: HOME | End: 2025-07-04
Attending: FAMILY MEDICINE | Admitting: FAMILY MEDICINE
Payer: COMMERCIAL

## 2025-07-04 VITALS
SYSTOLIC BLOOD PRESSURE: 148 MMHG | TEMPERATURE: 98 F | BODY MASS INDEX: 21.66 KG/M2 | DIASTOLIC BLOOD PRESSURE: 70 MMHG | OXYGEN SATURATION: 99 % | WEIGHT: 138 LBS | HEART RATE: 58 BPM | HEIGHT: 67 IN

## 2025-07-04 DIAGNOSIS — M54.6 ACUTE MIDLINE THORACIC BACK PAIN: ICD-10-CM

## 2025-07-04 PROCEDURE — 99284 EMERGENCY DEPT VISIT MOD MDM: CPT | Mod: 25 | Performed by: FAMILY MEDICINE

## 2025-07-04 PROCEDURE — 99283 EMERGENCY DEPT VISIT LOW MDM: CPT | Performed by: FAMILY MEDICINE

## 2025-07-04 PROCEDURE — 250N000013 HC RX MED GY IP 250 OP 250 PS 637: Performed by: FAMILY MEDICINE

## 2025-07-04 PROCEDURE — 72072 X-RAY EXAM THORAC SPINE 3VWS: CPT

## 2025-07-04 PROCEDURE — 93010 ELECTROCARDIOGRAM REPORT: CPT | Performed by: FAMILY MEDICINE

## 2025-07-04 PROCEDURE — 93005 ELECTROCARDIOGRAM TRACING: CPT | Performed by: FAMILY MEDICINE

## 2025-07-04 PROCEDURE — 71046 X-RAY EXAM CHEST 2 VIEWS: CPT

## 2025-07-04 RX ORDER — LIDOCAINE 4 G/G
1 PATCH TOPICAL
Status: DISCONTINUED | OUTPATIENT
Start: 2025-07-04 | End: 2025-07-04 | Stop reason: HOSPADM

## 2025-07-04 RX ORDER — OXYCODONE HYDROCHLORIDE 5 MG/1
5 TABLET ORAL EVERY 6 HOURS PRN
Qty: 10 TABLET | Refills: 0 | Status: SHIPPED | OUTPATIENT
Start: 2025-07-04

## 2025-07-04 RX ADMIN — LIDOCAINE 1 PATCH: 4 PATCH TOPICAL at 14:30

## 2025-07-04 ASSESSMENT — COLUMBIA-SUICIDE SEVERITY RATING SCALE - C-SSRS
1. IN THE PAST MONTH, HAVE YOU WISHED YOU WERE DEAD OR WISHED YOU COULD GO TO SLEEP AND NOT WAKE UP?: NO
6. HAVE YOU EVER DONE ANYTHING, STARTED TO DO ANYTHING, OR PREPARED TO DO ANYTHING TO END YOUR LIFE?: NO
2. HAVE YOU ACTUALLY HAD ANY THOUGHTS OF KILLING YOURSELF IN THE PAST MONTH?: NO

## 2025-07-04 ASSESSMENT — ACTIVITIES OF DAILY LIVING (ADL)
ADLS_ACUITY_SCORE: 41

## 2025-07-04 NOTE — ED TRIAGE NOTES
"Pt developed upper back pain 2 weeks ago after chiropractor adjusted her mid back.  No cp or soa.  Pt \"taking 200 mg advil every 4 hours with no relief.\"  This writer informed correct dosing of advil and how often pt can take.     Triage Assessment (Adult)       Row Name 07/04/25 1252          Triage Assessment    Airway WDL WDL        Respiratory WDL    Respiratory WDL WDL        Cognitive/Neuro/Behavioral WDL    Cognitive/Neuro/Behavioral WDL WDL                     "

## 2025-07-04 NOTE — DISCHARGE INSTRUCTIONS
ICD-10-CM    1. Acute midline thoracic back pain  M54.6     take tylenol 1000 mg every 6 hours. take ibuprofen occl 200-400 mg orally every 12 hours.  use the lidocaine 4%patch for 12 of every 24 hours,.  may use oxycodone for breakethrough pain. maintain back range of motion and foillow-up primary provider.  XRay reassuring but if persistent localizwed pain then recommend CT thoracic back

## 2025-07-04 NOTE — ED PROVIDER NOTES
History     Chief Complaint   Patient presents with    Back Pain     HPI  Ruth Pinon is a 79 year old female who presents with upper back pain 2 weeks of symptoms adjusted mid back by chiropractor.  Taking ibuprofen without relief.  Past medical history of social anxiety, COPD, chronic kidney disease, chronic right-sided thoracic back pain.  Thyroid goiter, breast cancer.  She was referred to the emergency department after speaking with triage nursing about the severe pain nature.  Seen for possible urinary tract infection in June with relatively negative testing'    She is here with midthoracic back pain primarily midline and just to the side of the midline that had onset after she had chiropractic manipulation.  She also had been doing some more activity recently and using some weights.  The pain does not radiate there is no associated weakness there is no cauda equina symptoms although she feels like she may be dragging her left foot recently.    No symptoms suggestive of  cauda equina syndrome (central spinal stenosis) such as  retention of urine or stool, inner thigh numbness or foot drop.    Denies any shortness of breath beyond baseline no associated chest pain.  No fever cough other changes.  No falls or other trauma      Allergies:  Allergies   Allergen Reactions    Sulfa (Sulfonamide Antibiotics) [Sulfa Antibiotics] Unknown       Problem List:    Patient Active Problem List    Diagnosis Date Noted    Foraminal stenosis of cervical region 03/10/2025     Priority: Medium    Carpal tunnel syndrome 02/27/2024     Priority: Medium    Numbness and tingling of right upper extremity 01/22/2024     Priority: Medium    Cervicalgia 01/22/2024     Priority: Medium    Chronic right-sided thoracic back pain 01/22/2024     Priority: Medium    Lumbar back pain 01/22/2024     Priority: Medium    Skin cancer of face 06/07/2023     Priority: Medium    Chronic kidney disease, stage 3a (H) 06/07/2023     Priority:  "Medium    Age-related macular degeneration, dry, both eyes 05/10/2022     Priority: Medium    Emphysema/COPD (H) 04/30/2021     Priority: Medium    Age related osteoporosis 01/17/2021     Priority: Medium    Social anxiety disorder 09/08/2016     Priority: Medium    Osteopenia 09/08/2016     Priority: Medium    Hearing loss, bilateral 09/08/2016     Priority: Medium    Personal history of malignant neoplasm of breast 02/18/2016     Priority: Medium    Thyroid goiter 02/18/2016     Priority: Medium    H/O hysterectomy for benign disease 02/18/2016     Priority: Medium        Past Medical History:    Past Medical History:   Diagnosis Date    Breast cancer (H) 2001       Past Surgical History:    Past Surgical History:   Procedure Laterality Date    BIOPSY BREAST Left 2001    HYSTERECTOMY      LUMPECTOMY BREAST Left 2001    REPAIR BLADDER         Family History:    Family History   Problem Relation Age of Onset    Colon Cancer Mother        Social History:  Marital Status:  Single [1]  Social History     Tobacco Use    Smoking status: Never    Smokeless tobacco: Never   Vaping Use    Vaping status: Never Used        Medications:    ASCORBATE CALCIUM (VITAMIN C ORAL)  ASHWAGANDHA PO  atorvastatin (LIPITOR) 10 MG tablet  Bioflavonoid Products (GRAPE SEED EXTRACT PO)  levothyroxine (SYNTHROID/LEVOTHROID) 25 MCG tablet  lisinopril (ZESTRIL) 10 MG tablet  MAGNESIUM ORAL  NEW MED  OMEGA-3S/DHA/EPA/FISH OIL (OMEGA 3 ORAL)  TURMERIC-ANGELLA-BLACK PEPPER PO          Review of Systems  ROS:  5 point ROS negative except as noted above in HPI, including Gen., Resp., CV, GI &  system review.      Physical Exam   BP: 138/63  Pulse: 55  Temp: 98  F (36.7  C)  Height: 170.2 cm (5' 7\")  Weight: 62.6 kg (138 lb)  SpO2: 100 %      Physical Exam  Constitutional:       General: She is in acute distress.      Appearance: She is not diaphoretic.   Eyes:      Conjunctiva/sclera: Conjunctivae normal.   Cardiovascular:      Rate and Rhythm: " Normal rate and regular rhythm.      Heart sounds: No murmur heard.  Pulmonary:      Effort: Pulmonary effort is normal. No respiratory distress.      Breath sounds: Normal breath sounds. No stridor. No wheezing or rhonchi.   Musculoskeletal:      Cervical back: Neck supple.      Right lower leg: No edema.      Left lower leg: No edema.   Skin:     Coloration: Skin is not pale.      Findings: No rash.   Neurological:      Mental Status: She is alert.       The midline thoracic spine is tender to palpation in the region of the T8-10 region.  No midline tenderness over the cervical and lumbar spine no obvious step-offs other lesions no rash.  Tenderness palpation also over paraspinous muscles.  The abdomen is soft nontender the lower extremities with normal motor strength including great toe against resistance.        ED Course        Procedures                EKG Interpretation:      Interpreted by Alfredo Sena MD     EKG done at 1412 hrs. demonstrates a sinus rhythm 53 bpm normal axis.  No ST change.  No T wave changes.  Normal R progression and no Q waves.  Normal intervals.  Normal conduction.  No ectopy.  Impression sinus bradycardia 53 bpm no significant other changes.    Critical Care time:  none     None         Recent Results (from the past 24 hours)   EKG 12 lead   Result Value Ref Range    Systolic Blood Pressure  mmHg    Diastolic Blood Pressure  mmHg    Ventricular Rate 53 BPM    Atrial Rate 53 BPM    ME Interval 158 ms    QRS Duration 70 ms     ms    QTc 414 ms    P Axis 70 degrees    R AXIS 61 degrees    T Axis 64 degrees    Interpretation ECG       Sinus bradycardia  Nonspecific ST abnormality  Abnormal ECG  When compared with ECG of 18-Feb-2016 09:25,  No significant change was found     Thoracic spine XR, 3 views    Narrative    EXAM: XR THORACIC SPINE 3 VIEWS  LOCATION: [facility]  DATE: 7/4/2025    INDICATION: T8 10 region midline pain after manipulation  suspicous for compression fracture.  outpatient CT considered  COMPARISON: None.      Impression    IMPRESSION: Thoracic dextrocurvature. Sagittal alignment is normal. The bones are demineralized. Vertebral body heights are maintained. No anterior compression fracture. Mild thoracic spondylosis. No acute soft tissue abnormality.    Chest XR,  PA & LAT    Narrative    EXAM: XR CHEST 2 VIEWS  LOCATION: [facility]  DATE: 7/4/2025    INDICATION: thoracic back pain. exclude referred pain  COMPARISON: 12/1/2017       Impression    IMPRESSION: Calcified aortic atherosclerosis. Surgical clips left axilla. Mild degenerative change thoracic spine. No change from prior.        Medications - No data to display    Assessments & Plan (with Medical Decision Making)     MDM: Ruth Pinon is a 79 year old female presenting with back pain thoracic lower region.  Palpation of the cervical and lumbar spine is without tenderness.  Suspicious for compression fracture after manipulation and will obtain x-rays.  Will likely require additional management at home.  She did drive herself here.  Tylenol before arrival will place a lidocaine patch.  Due to advanced age and underlying comorbidities including heart chronic kidney disease would avoid regular use of ibuprofen although occasional could be taken.    X-rays are reassuring do not demonstrate compression fracture although on the lateral it may be difficult to visualize more occult thoracic compression the symptoms are suggestive of this.  I therefore would recommend she follow-up in clinic could consider CT imaging of this region.  Discussed symptomatic management reviewed PDMP.  Precautions as below.  Follow-up primary care      I have reviewed the nursing notes.    I have reviewed the findings, diagnosis, plan and need for follow up with the patient.           Medical Decision Making  The patient's presentation was of low complexity (an acute and  uncomplicated illness or injury).    The patient's evaluation involved:  ordering and/or review of 3+ test(s) in this encounter (see separate area of note for details)    The patient's management necessitated moderate risk (prescription drug management including medications given in the ED).        New Prescriptions    No medications on file       Final diagnoses:   Acute midline thoracic back pain - take tylenol 1000 mg every 6 hours. take ibuprofen occl 200-400 mg orally every 12 hours.  use the lidocaine 4%patch for 12 of every 24 hours,.  may use oxycodone for breakethrough pain. maintain back range of motion and foillow-up primary provider.  XRay reassuring but if persistent localizwed pain then recommend CT thoracic back        7/4/2025   [facility] EMERGENCY DEPT       Alfredo Sena MD  07/04/25 [id number]

## 2025-07-05 LAB
ATRIAL RATE - MUSE: 53 BPM
DIASTOLIC BLOOD PRESSURE - MUSE: NORMAL MMHG
INTERPRETATION ECG - MUSE: NORMAL
P AXIS - MUSE: 70 DEGREES
PR INTERVAL - MUSE: 158 MS
QRS DURATION - MUSE: 70 MS
QT - MUSE: 442 MS
QTC - MUSE: 414 MS
R AXIS - MUSE: 61 DEGREES
SYSTOLIC BLOOD PRESSURE - MUSE: NORMAL MMHG
T AXIS - MUSE: 64 DEGREES
VENTRICULAR RATE- MUSE: 53 BPM

## 2025-07-10 ENCOUNTER — LAB (OUTPATIENT)
Dept: LAB | Facility: CLINIC | Age: 80
End: 2025-07-10
Payer: COMMERCIAL

## 2025-07-10 DIAGNOSIS — M35.00 SICCA: Primary | ICD-10-CM

## 2025-07-10 DIAGNOSIS — M81.0 AGE-RELATED OSTEOPOROSIS WITHOUT CURRENT PATHOLOGICAL FRACTURE: ICD-10-CM

## 2025-07-10 DIAGNOSIS — M35.00 SICCA: ICD-10-CM

## 2025-07-10 DIAGNOSIS — Z92.29 PERSONAL HISTORY OF OTHER DRUG THERAPY: ICD-10-CM

## 2025-07-10 DIAGNOSIS — I10 ESSENTIAL HYPERTENSION: ICD-10-CM

## 2025-07-10 DIAGNOSIS — E78.00 HYPERCHOLESTEROLEMIA: ICD-10-CM

## 2025-07-10 LAB
ALBUMIN SERPL BCG-MCNC: 4 G/DL (ref 3.5–5.2)
ALBUMIN UR-MCNC: NEGATIVE MG/DL
ALP SERPL-CCNC: 45 U/L (ref 40–150)
ALT SERPL W P-5'-P-CCNC: 19 U/L (ref 0–50)
ANION GAP SERPL CALCULATED.3IONS-SCNC: 9 MMOL/L (ref 7–15)
APPEARANCE UR: CLEAR
AST SERPL W P-5'-P-CCNC: 31 U/L (ref 0–45)
BASOPHILS # BLD AUTO: 0 10E3/UL (ref 0–0.2)
BASOPHILS NFR BLD AUTO: 0 %
BILIRUB SERPL-MCNC: 0.5 MG/DL
BILIRUB UR QL STRIP: NEGATIVE
BILIRUBIN DIRECT (ROCHE PRO & PURE): 0.26 MG/DL (ref 0–0.45)
BUN SERPL-MCNC: 17.9 MG/DL (ref 8–23)
CALCIUM SERPL-MCNC: 9.5 MG/DL (ref 8.8–10.4)
CALCIUM SERPL-MCNC: 9.5 MG/DL (ref 8.8–10.4)
CHLORIDE SERPL-SCNC: 104 MMOL/L (ref 98–107)
CHOLEST SERPL-MCNC: 132 MG/DL
COLOR UR AUTO: YELLOW
CREAT SERPL-MCNC: 1 MG/DL (ref 0.51–0.95)
CRP SERPL-MCNC: <3 MG/L
EGFRCR SERPLBLD CKD-EPI 2021: 58 ML/MIN/1.73M2
EOSINOPHIL # BLD AUTO: 0 10E3/UL (ref 0–0.7)
EOSINOPHIL NFR BLD AUTO: 0 %
ERYTHROCYTE [DISTWIDTH] IN BLOOD BY AUTOMATED COUNT: 12.7 % (ref 10–15)
FASTING STATUS PATIENT QL REPORTED: YES
FASTING STATUS PATIENT QL REPORTED: YES
GLUCOSE SERPL-MCNC: 87 MG/DL (ref 70–99)
GLUCOSE UR STRIP-MCNC: NEGATIVE MG/DL
HCO3 SERPL-SCNC: 25 MMOL/L (ref 22–29)
HCT VFR BLD AUTO: 39.8 % (ref 35–47)
HDLC SERPL-MCNC: 55 MG/DL
HGB BLD-MCNC: 12.9 G/DL (ref 11.7–15.7)
HGB UR QL STRIP: NEGATIVE
IMM GRANULOCYTES # BLD: 0 10E3/UL
IMM GRANULOCYTES NFR BLD: 0 %
KETONES UR STRIP-MCNC: NEGATIVE MG/DL
LDLC SERPL CALC-MCNC: 61 MG/DL
LEUKOCYTE ESTERASE UR QL STRIP: NEGATIVE
LYMPHOCYTES # BLD AUTO: 1.8 10E3/UL (ref 0.8–5.3)
LYMPHOCYTES NFR BLD AUTO: 26 %
MCH RBC QN AUTO: 30.2 PG (ref 26.5–33)
MCHC RBC AUTO-ENTMCNC: 32.4 G/DL (ref 31.5–36.5)
MCV RBC AUTO: 93 FL (ref 78–100)
MONOCYTES # BLD AUTO: 0.4 10E3/UL (ref 0–1.3)
MONOCYTES NFR BLD AUTO: 6 %
NEUTROPHILS # BLD AUTO: 4.5 10E3/UL (ref 1.6–8.3)
NEUTROPHILS NFR BLD AUTO: 67 %
NITRATE UR QL: NEGATIVE
NONHDLC SERPL-MCNC: 77 MG/DL
PH UR STRIP: 7 [PH] (ref 5–8)
PLATELET # BLD AUTO: 159 10E3/UL (ref 150–450)
POTASSIUM SERPL-SCNC: 4.3 MMOL/L (ref 3.4–5.3)
PROT SERPL-MCNC: 6.9 G/DL (ref 6.4–8.3)
RBC # BLD AUTO: 4.27 10E6/UL (ref 3.8–5.2)
SODIUM SERPL-SCNC: 138 MMOL/L (ref 135–145)
SP GR UR STRIP: 1.01 (ref 1–1.03)
TRIGL SERPL-MCNC: 78 MG/DL
UROBILINOGEN UR STRIP-ACNC: 0.2 E.U./DL
WBC # BLD AUTO: 6.8 10E3/UL (ref 4–11)

## 2025-07-11 ENCOUNTER — OFFICE VISIT (OUTPATIENT)
Dept: FAMILY MEDICINE | Facility: CLINIC | Age: 80
End: 2025-07-11
Payer: COMMERCIAL

## 2025-07-11 VITALS
BODY MASS INDEX: 21.74 KG/M2 | WEIGHT: 138.8 LBS | DIASTOLIC BLOOD PRESSURE: 50 MMHG | SYSTOLIC BLOOD PRESSURE: 131 MMHG | HEART RATE: 54 BPM | OXYGEN SATURATION: 100 % | TEMPERATURE: 97.6 F | RESPIRATION RATE: 16 BRPM

## 2025-07-11 DIAGNOSIS — R68.2 DRY MOUTH AND EYES: ICD-10-CM

## 2025-07-11 DIAGNOSIS — R32 URINARY INCONTINENCE, UNSPECIFIED TYPE: ICD-10-CM

## 2025-07-11 DIAGNOSIS — R94.4 DECREASED GFR: ICD-10-CM

## 2025-07-11 DIAGNOSIS — E78.00 HYPERCHOLESTEROLEMIA: ICD-10-CM

## 2025-07-11 DIAGNOSIS — M48.02 FORAMINAL STENOSIS OF CERVICAL REGION: ICD-10-CM

## 2025-07-11 DIAGNOSIS — M54.6 ACUTE MIDLINE THORACIC BACK PAIN: Primary | ICD-10-CM

## 2025-07-11 DIAGNOSIS — R29.898 LEFT LEG WEAKNESS: ICD-10-CM

## 2025-07-11 DIAGNOSIS — H04.123 DRY MOUTH AND EYES: ICD-10-CM

## 2025-07-11 PROCEDURE — G2211 COMPLEX E/M VISIT ADD ON: HCPCS | Performed by: FAMILY MEDICINE

## 2025-07-11 PROCEDURE — 3075F SYST BP GE 130 - 139MM HG: CPT | Performed by: FAMILY MEDICINE

## 2025-07-11 PROCEDURE — 3078F DIAST BP <80 MM HG: CPT | Performed by: FAMILY MEDICINE

## 2025-07-11 PROCEDURE — 99214 OFFICE O/P EST MOD 30 MIN: CPT | Performed by: FAMILY MEDICINE

## 2025-07-11 RX ORDER — ATORVASTATIN CALCIUM 10 MG/1
10 TABLET, FILM COATED ORAL DAILY
Qty: 90 TABLET | Refills: 3 | Status: SHIPPED | OUTPATIENT
Start: 2025-07-11

## 2025-07-11 NOTE — PROGRESS NOTES
Assessment & Plan     Acute midline thoracic back pain    She has had some discomfort since being treated by her chiropractor  She had an emergency department visit and x-rays of thoracic spine are negative for acute fracture  May be muscular pain.  Consider possible nerve impingement  Given that she is improving she will continue with conservative management  She may take Tylenol and consider application of a lidocaine patch  Consider physical therapy if not improving  Finally, can consider additional imaging including a CT scan of the thoracic spine or possible MRI if not improving    Hypercholesterolemia    Recent cholesterol numbers revealed an LDL of 61  Continue atorvastatin    - atorvastatin (LIPITOR) 10 MG tablet; Take 1 tablet (10 mg) by mouth daily.    Urinary incontinence, unspecified type    She has been doing Kegel exercises  Offered referral to urology for urodynamic testing if desired    Left leg weakness    Reviewed  Recommend strengthening exercise as she is able  Consider further evaluation if having ongoing symptoms of concern    Foraminal stenosis of cervical region    She has been treated conservatively and did have an abnormal MRI showing foraminal stenosis    Dry mouth and eyes    Possible Sjogren's  She has followed up with rheumatology and ENT    Decreased GFR  History of chronic kidney disease    Recommend avoiding NSAIDs and nephrotoxic substances  Recommend good control of blood pressure  She will continue lisinopril  Continue to monitor    The longitudinal plan of care for the diagnosis(es)/condition(s) as documented were addressed during this visit. Due to the added complexity in care, I will continue to support Ruth in the subsequent management and with ongoing continuity of care.        MED REC REQUIRED  Post Medication Reconciliation Status: discharge medications reconciled, continue medications without change      Subjective   Ruth is a 79 year old, presenting for the following  health issues:  Hospital F/U        7/11/2025     8:40 AM   Additional Questions   Roomed by Tonja TURCIOS CMA     [facility]          Hospital Follow-up Visit:    Hospital/Nursing Home/IP Rehab Facility: [facility]  Most Recent Admission Date: 7/4/2025   Most Recent Admission Diagnosis:      Most Recent Discharge Date: 7/4/2025   Most Recent Discharge Diagnosis: Acute midline thoracic back pain - M54.6   Do you have any other stressors you would like to discuss with your provider? No    Problems taking medications regularly:  None  Medication changes since discharge: None  Problems adhering to non-medication therapy:  None    Summary of hospitalization:  [facility] discharge summary reviewed  Diagnostic Tests/Treatments reviewed.  Follow up needed: none  Other Healthcare Providers Involved in Patient s Care:         None  Update since discharge: improvedRafa Miranda is a pleasant 79-year-old female who presents to clinic in follow-up after sustaining a recent  back injury.  More than 2 weeks ago she was being treated by chiropractor for back and neck pain  and felt instant pain in the right mid back area.  She presented to the emergency department and  x-rays of the thoracic spine were negative for acute fracture.  She has been treated conservatively  with some improvement though can still have some discomfort radiating to the right side.    Medical history reviewed:      She has a complex medical history notable for emphysema, osteoporosis, and chronic kidney disease.     This past year she did have an evaluation for ongoing neck pain as well as numbness tingling in her right arm.  She followed up and was diagnosed with carpal tunnel syndrome.  She had an abnormal MRI which revealed foraminal stenosis.     She continues to be treated with levothyroxine and follows up with Cora Bourne regarding that.      She has a history of breast cancer with previous left breast lumpectomy.       She has been following up with rheumatology and has had dry eyes and light sensitivity.  She has noted cracked skin and gets sores in her mouth.  She has followed up with ENT and possibly has Sjogren's.  She has considered a biopsy.    She also has experienced urinary incontinence.  She notes that there has been mild weakness of her left leg.  Denies numbness or tingling.  She needs a refill of her cholesterol medication.  Her recent LDL was 61.      Plan of care communicated with patient                       Objective    /50 (BP Location: Left arm, Patient Position: Sitting, Cuff Size: Adult Regular)   Pulse 54   Temp 97.6  F (36.4  C) (Oral)   Resp 16   Wt 63 kg (138 lb 12.8 oz)   LMP  (LMP Unknown)   SpO2 100%   BMI 21.74 kg/m    Body mass index is 21.74 kg/m .  Physical Exam   GENERAL: alert and no distress  EYES: Eyes grossly normal to inspection, PERRL and conjunctivae and sclerae normal  RESP: lungs clear to auscultation - no rales, rhonchi or wheezes  CV: regular rate and rhythm, normal S1 S2, no S3 or S4, no murmur, click or rub, no peripheral edema  MS: There is no tenderness over the thoracic spine  There is some tenderness to palpation medial to the right scapula in the rhomboid area  She has normal right shoulder range of motion  SKIN: no suspicious lesions or rashes  NEURO: Normal strength and tone, mentation intact and speech normal  PSYCH: mentation appears normal, affect normal/bright            Signed Electronically by: Hugo Clarke MD

## 2025-07-22 ENCOUNTER — ALLIED HEALTH/NURSE VISIT (OUTPATIENT)
Dept: ENDOCRINOLOGY | Facility: CLINIC | Age: 80
End: 2025-07-22
Payer: COMMERCIAL

## 2025-07-22 DIAGNOSIS — Z92.29 PERSONAL HISTORY OF OTHER DRUG THERAPY: ICD-10-CM

## 2025-07-22 DIAGNOSIS — M81.0 AGE-RELATED OSTEOPOROSIS WITHOUT CURRENT PATHOLOGICAL FRACTURE: Primary | ICD-10-CM

## 2025-07-22 PROCEDURE — 99207 PR NO CHARGE NURSE ONLY: CPT

## 2025-07-22 PROCEDURE — 96372 THER/PROPH/DIAG INJ SC/IM: CPT | Performed by: NURSE PRACTITIONER

## 2025-07-22 PROCEDURE — 99207 PR NO CHARGE NURSE ONLY: CPT | Performed by: NURSE PRACTITIONER

## 2025-07-22 NOTE — PROGRESS NOTES
Clinic Administered Medication Documentation      Prolia Documentation    Indication: Prolia  (denosumab) is a prescription medicine used to treat osteoporosis in patients who:   Are at high risk for fracture, meaning patients who have had a fracture related to osteoporosis, or who have multiple risk factors for fracture.  Cannot use another osteoporosis medicine or other osteoporosis medicines did not work well.  The timeline for early/late injections would be 4 weeks early and any time after the 6 month alayna. If a patient receives their injection late, then the subsequent injection would be 6 months from the date that they actually received the injection.    When was the last injection?  25  Was the last injection at least 6 months ago? Yes  Has the prior authorization been completed?  Yes  Is there an active order (written within the past 365 days, with administrations remaining, not ) in the chart?  Yes   Calcium   Date Value Ref Range Status   07/10/2025 9.5 8.8 - 10.4 mg/dL Final   07/10/2025 9.5 8.8 - 10.4 mg/dL Final     Has patient had a Calcium test in the last 12 months? Yes   Is the calcium result 8.8 or above? Yes   GFR Estimate   Date Value Ref Range Status   07/10/2025 58 (L) >60 mL/min/1.73m2 Final     Comment:     eGFR calculated using  CKD-EPI equation.   2021 46 (L) >60 mL/min/1.73m2 Final     Has patient had a GFR within the last 12 months? Yes   Is GFR under 30, or patient has a diagnosis of CKD4 or CKD5? No   Patient denies gastric bypass or parathyroid surgery in past 6 months? Yes - patient denies.   Patient denies undergoing any dental procedures involving drilling into the bone, such as implants, extractions, or oral surgery, within the past two months that have not yet healed?  Yes - patient denies  Patient denies plans for an emergency tooth extraction within the next week? Yes    The following steps were completed to comply with the REMS program for  Prolia:  Reviewed information in the Medication Guide, including the serious risks of Prolia  and the symptoms of each risk.  Advised patient to seek prompt medical attention if they have signs or symptoms of any of the serious risks.  Provided each patient a copy of the Medication Guide and Patient Guide.    Prior to injection, verified patient identity using patient's name and date of birth. Medication was administered. Please see MAR and medication order for additional information. Patient instructed to remain in clinic for 15 minutes and report any adverse reaction to staff immediately.    Vial/Syringe: Syringe  Was this medication supplied by the patient? No  Verified that the patient has administrations remaining in their prescription.

## 2025-09-02 ENCOUNTER — VIRTUAL VISIT (OUTPATIENT)
Dept: ENDOCRINOLOGY | Facility: CLINIC | Age: 80
End: 2025-09-02
Payer: COMMERCIAL

## 2025-09-02 DIAGNOSIS — E04.9 THYROID GOITER: ICD-10-CM

## 2025-09-02 DIAGNOSIS — M81.0 AGE-RELATED OSTEOPOROSIS WITHOUT CURRENT PATHOLOGICAL FRACTURE: Primary | ICD-10-CM

## 2025-09-02 PROCEDURE — 98006 SYNCH AUDIO-VIDEO EST MOD 30: CPT | Performed by: NURSE PRACTITIONER

## 2025-09-03 PROBLEM — R20.2 NUMBNESS AND TINGLING OF RIGHT UPPER EXTREMITY: Status: RESOLVED | Noted: 2024-01-22 | Resolved: 2025-09-03

## 2025-09-03 PROBLEM — H04.123 DRY EYE SYNDROME OF BOTH EYES DUE TO MEIBOMIAN GLAND DYSFUNCTION: Status: ACTIVE | Noted: 2025-09-03

## 2025-09-03 PROBLEM — H02.886 DRY EYE SYNDROME OF BOTH EYES DUE TO MEIBOMIAN GLAND DYSFUNCTION: Status: ACTIVE | Noted: 2025-09-03

## 2025-09-03 PROBLEM — R20.0 NUMBNESS AND TINGLING OF RIGHT UPPER EXTREMITY: Status: RESOLVED | Noted: 2024-01-22 | Resolved: 2025-09-03

## 2025-09-03 PROBLEM — H02.883 DRY EYE SYNDROME OF BOTH EYES DUE TO MEIBOMIAN GLAND DYSFUNCTION: Status: ACTIVE | Noted: 2025-09-03
